# Patient Record
Sex: FEMALE | Race: WHITE | Employment: UNEMPLOYED | URBAN - METROPOLITAN AREA
[De-identification: names, ages, dates, MRNs, and addresses within clinical notes are randomized per-mention and may not be internally consistent; named-entity substitution may affect disease eponyms.]

---

## 2017-02-27 ENCOUNTER — OFFICE VISIT (OUTPATIENT)
Dept: FAMILY MEDICINE CLINIC | Facility: CLINIC | Age: 62
End: 2017-02-27

## 2017-02-27 VITALS
TEMPERATURE: 98 F | BODY MASS INDEX: 20.28 KG/M2 | HEIGHT: 67 IN | HEART RATE: 72 BPM | DIASTOLIC BLOOD PRESSURE: 60 MMHG | RESPIRATION RATE: 72 BRPM | SYSTOLIC BLOOD PRESSURE: 100 MMHG | WEIGHT: 129.19 LBS

## 2017-02-27 DIAGNOSIS — F17.200 TOBACCO USE DISORDER: ICD-10-CM

## 2017-02-27 DIAGNOSIS — H65.93 BILATERAL OTITIS MEDIA WITH EFFUSION: Primary | ICD-10-CM

## 2017-02-27 DIAGNOSIS — J06.9 URI, ACUTE: ICD-10-CM

## 2017-02-27 PROCEDURE — 99213 OFFICE O/P EST LOW 20 MIN: CPT | Performed by: FAMILY MEDICINE

## 2017-02-27 RX ORDER — AZITHROMYCIN 250 MG/1
TABLET, FILM COATED ORAL
Qty: 6 TABLET | Refills: 0 | Status: SHIPPED | OUTPATIENT
Start: 2017-02-27 | End: 2017-04-03 | Stop reason: ALTCHOICE

## 2017-02-27 NOTE — PROGRESS NOTES
Mariza Tran is a 62year old female. S:  Patient presents today with the following concerns:  · Congestion, sinus pressure, PND. Headache. 2 weeks of symptoms. Taking mucinex. Right ear pain. No fevers. No N/V/D. · Wants to quit smoking.  Wo distress. Mood, affect, and behavior are normal.  SKIN: no rashes,no suspicious lesions  HEENT: atraumatic, normocephalic, bilateral TM's with erythema. Nasal turbinates swollen. Pharynx is clear.   EYES:PERRLA, EOMI  NECK: supple,no adenopathy  LUNGS: C

## 2017-03-31 ENCOUNTER — TELEPHONE (OUTPATIENT)
Dept: FAMILY MEDICINE CLINIC | Facility: CLINIC | Age: 62
End: 2017-03-31

## 2017-03-31 NOTE — TELEPHONE ENCOUNTER
LOV 2/22/17 and at that time, pt was advised to follow up in one month. Pt did not start the Chantix right away.  Future Appointments  Date Time Provider Colette Gil   4/3/2017 12:00 PM Hazel Mao PA-C [facility] [place]     She will take her las

## 2017-04-03 NOTE — PROGRESS NOTES
Niraj Aguilera is a 62year old female. S:  Patient presents today with the following concerns:  · Follow up on Chantix. Took for 1 month. Ran out of pills on Thursday so has been off of it for 4 days but did not start smoking again-has come close. kg/m2  GENERAL: well developed, well nourished,in no apparent distress.   Mood, affect, and behavior are normal.  SKIN: no rashes,no suspicious lesions  NECK: supple,no adenopathy  LUNGS: CTA, no RRW  CARDIO: RRR without murmur  NEURO: Oriented times three,

## 2017-06-22 ENCOUNTER — TELEPHONE (OUTPATIENT)
Dept: FAMILY MEDICINE CLINIC | Facility: CLINIC | Age: 62
End: 2017-06-22

## 2017-06-23 ENCOUNTER — HOSPITAL ENCOUNTER (OUTPATIENT)
Dept: MAMMOGRAPHY | Age: 62
Discharge: HOME | End: 2017-06-23
Attending: OBSTETRICS & GYNECOLOGY
Payer: COMMERCIAL

## 2017-06-23 DIAGNOSIS — Z12.31 ENCOUNTER FOR SCREENING MAMMOGRAM FOR MALIGNANT NEOPLASM OF BREAST: ICD-10-CM

## 2017-06-23 PROCEDURE — 77063 BREAST TOMOSYNTHESIS BI: CPT | Performed by: OBSTETRICS & GYNECOLOGY

## 2017-06-23 PROCEDURE — 77067 SCR MAMMO BI INCL CAD: CPT | Performed by: OBSTETRICS & GYNECOLOGY

## 2017-06-29 NOTE — TELEPHONE ENCOUNTER
[address] is calling to find status on Varenicline Tartrate (CHANTIX) 1 MG Oral Tab. Please contact Pharmacy.

## 2017-06-30 RX ORDER — VARENICLINE TARTRATE 1 MG/1
1 TABLET, FILM COATED ORAL 2 TIMES DAILY
Qty: 60 TABLET | Refills: 1 | Status: SHIPPED | OUTPATIENT
Start: 2017-06-30 | End: 2018-02-12 | Stop reason: ALTCHOICE

## 2017-07-17 ENCOUNTER — OFFICE VISIT (OUTPATIENT)
Dept: FAMILY MEDICINE CLINIC | Facility: CLINIC | Age: 62
End: 2017-07-17

## 2017-07-17 VITALS
DIASTOLIC BLOOD PRESSURE: 60 MMHG | SYSTOLIC BLOOD PRESSURE: 106 MMHG | BODY MASS INDEX: 19.86 KG/M2 | WEIGHT: 128 LBS | HEART RATE: 72 BPM | OXYGEN SATURATION: 98 % | HEIGHT: 67.5 IN | TEMPERATURE: 98 F

## 2017-07-17 DIAGNOSIS — J01.00 ACUTE NON-RECURRENT MAXILLARY SINUSITIS: Primary | ICD-10-CM

## 2017-07-17 PROCEDURE — 99213 OFFICE O/P EST LOW 20 MIN: CPT | Performed by: FAMILY MEDICINE

## 2017-07-17 RX ORDER — AMOXICILLIN AND CLAVULANATE POTASSIUM 875; 125 MG/1; MG/1
1 TABLET, FILM COATED ORAL 2 TIMES DAILY
Qty: 20 TABLET | Refills: 0 | Status: SHIPPED | OUTPATIENT
Start: 2017-07-17 | End: 2017-07-27

## 2017-07-17 NOTE — PATIENT INSTRUCTIONS
Take antibiotics with food and plenty of water. Eat yogurt or take probiotic daily. (Rosa Thornton is a good example of an OTC probiotic)  Make sure to finish the entire antibiotic treatment. Increase fluids and rest.   Use otc meds as needed.   Monitor symptoms

## 2017-07-18 NOTE — PROGRESS NOTES
CHIEF COMPLAINT:   Patient presents with:  Sinus Problem: started with allergies about1 week ago. now with headache top of head, right ear pain. no fever/chills.        HPI:   Kishor Singletary is a 65year old female who presents for sinus congestion for MASS  10/2010: OTHER SURGICAL HISTORY      Comment: REMOVED A GROWTH ON TONGUE  1960'S: TONSILLECTOMY   Family History   Problem Relation Age of Onset   • Cancer Maternal Grandmother      BREAST   • Breast Cancer Maternal Grandmother 61   • Cancer Maternal diagnosis)    No orders of the defined types were placed in this encounter.       Meds & Refills for this Visit:  Signed Prescriptions Disp Refills    Amoxicillin-Pot Clavulanate 875-125 MG Oral Tab 20 tablet 0      Sig: Take 1 tablet by mouth 2 (two) times

## 2017-07-24 ENCOUNTER — HOSPITAL ENCOUNTER (OUTPATIENT)
Dept: BONE DENSITY | Age: 62
Discharge: HOME | End: 2017-07-24
Attending: ADVANCED PRACTICE MIDWIFE
Payer: COMMERCIAL

## 2017-07-24 DIAGNOSIS — Z13.820 SCREENING FOR OSTEOPOROSIS: ICD-10-CM

## 2017-07-24 PROCEDURE — 77080 DXA BONE DENSITY AXIAL: CPT | Performed by: ADVANCED PRACTICE MIDWIFE

## 2017-09-12 ENCOUNTER — NURSE ONLY (OUTPATIENT)
Dept: FAMILY MEDICINE CLINIC | Facility: CLINIC | Age: 62
End: 2017-09-12

## 2017-09-12 VITALS
OXYGEN SATURATION: 98 % | BODY MASS INDEX: 20.09 KG/M2 | HEIGHT: 67 IN | SYSTOLIC BLOOD PRESSURE: 114 MMHG | TEMPERATURE: 99 F | HEART RATE: 69 BPM | WEIGHT: 128 LBS | DIASTOLIC BLOOD PRESSURE: 72 MMHG

## 2017-09-12 DIAGNOSIS — H65.191 ACUTE EFFUSION OF RIGHT EAR: ICD-10-CM

## 2017-09-12 DIAGNOSIS — J01.10 ACUTE FRONTAL SINUSITIS, RECURRENCE NOT SPECIFIED: Primary | ICD-10-CM

## 2017-09-12 PROCEDURE — 99213 OFFICE O/P EST LOW 20 MIN: CPT | Performed by: PHYSICIAN ASSISTANT

## 2017-09-12 RX ORDER — AMOXICILLIN AND CLAVULANATE POTASSIUM 875; 125 MG/1; MG/1
1 TABLET, FILM COATED ORAL 2 TIMES DAILY
Qty: 20 TABLET | Refills: 0 | Status: SHIPPED | OUTPATIENT
Start: 2017-09-12 | End: 2017-09-22

## 2017-09-12 NOTE — PROGRESS NOTES
CHIEF COMPLAINT:   Patient presents with:  Sinus Problem: sinus pressure, nose congestion, right ear discomfort, drainage x 1 wk     HPI:   Yessenia Coon is a 65year old female who presents for sinus congestion for  1  weeks.  Symptoms have been worsen Comment: EXCISION OF LEFT BREAST MASS  10/2010: OTHER SURGICAL HISTORY      Comment: REMOVED A GROWTH ON TONGUE  1960'S: TONSILLECTOMY   Family History   Problem Relation Age of Onset   • Cancer Maternal Grandmother      BREAST   • Breast Cancer Matern ASSESSMENT: Acute frontal sinusitis, recurrence not specified  (primary encounter diagnosis)  Acute effusion of right ear    PLAN: Meds as below- take with meals, probiotic and/or greek yogurt as discussed.   Comfort care instructions as listed in Patient I Your doctor may prescribe medications to help treat your sinusitis. If you have an infection, antibiotics can help clear it up. If you are prescribed antibiotics, take all pills on schedule until they are gone, even if you feel better.  Decongestants help r · Over-the-counter decongestants may be used unless a similar medicine was prescribed. Nasal sprays work the fastest. Use one that contains phenylephrine or oxymetazoline. First blow the nose gently. Then use the spray.  Do not use these medicines more ofte © 6737-7464 The [address] [place] [address], [address]. All rights reserved. This information is not intended as a substitute for professional medical care. Always follow your healthcare professional's instructions.             The

## 2018-01-16 ENCOUNTER — OFFICE VISIT (OUTPATIENT)
Dept: FAMILY MEDICINE CLINIC | Facility: CLINIC | Age: 63
End: 2018-01-16

## 2018-01-16 VITALS
TEMPERATURE: 98 F | SYSTOLIC BLOOD PRESSURE: 126 MMHG | DIASTOLIC BLOOD PRESSURE: 74 MMHG | HEART RATE: 63 BPM | HEIGHT: 67 IN | WEIGHT: 130 LBS | OXYGEN SATURATION: 99 % | BODY MASS INDEX: 20.4 KG/M2

## 2018-01-16 DIAGNOSIS — J01.00 ACUTE MAXILLARY SINUSITIS, RECURRENCE NOT SPECIFIED: Primary | ICD-10-CM

## 2018-01-16 PROCEDURE — 99213 OFFICE O/P EST LOW 20 MIN: CPT | Performed by: PHYSICIAN ASSISTANT

## 2018-01-16 RX ORDER — AMOXICILLIN AND CLAVULANATE POTASSIUM 875; 125 MG/1; MG/1
1 TABLET, FILM COATED ORAL 2 TIMES DAILY
Qty: 20 TABLET | Refills: 0 | Status: SHIPPED | OUTPATIENT
Start: 2018-01-16 | End: 2018-01-26

## 2018-01-16 NOTE — PATIENT INSTRUCTIONS
Self-Care for Sinusitis     Drinking plenty of water can help sinuses drain. Sinusitis can often be managed with self-care. Self-care can keep sinuses moist and make you feel more comfortable. Remember to follow your doctor's instructions closely.  This Sinusitis (Antibiotic Treatment)    The sinuses are air-filled spaces within the bones of the face. They connect to the inside of the nose. Sinusitis is an inflammation of the tissue lining the sinus cavity. Sinus inflammation can occur during a cold.  It c · Do not use nasal rinses or irrigation during an acute sinus infection, unless told to by your health care provider. Rinsing may spread the infection to other sinuses.   · Use acetaminophen or ibuprofen to control pain, unless another pain medicine was pre Do what you can to avoid being exposed to colds and flu. When possible, take more time to rest when you feel something “coming on.”  · Wash your hands often. This is especially important during cold and flu season. Try not to touch your face.   · As much as

## 2018-01-16 NOTE — PROGRESS NOTES
CHIEF COMPLAINT:   Patient presents with:  Sinus Problem: headache, sinus pressure, nose congestion, drainage x 1 wk     HPI:   Lamont Vo is a 65year old female who presents for sinus congestion for  1  weeks.  Symptoms have been worsening since on Problem Relation Age of Onset   • Cancer Maternal Grandmother      BREAST   • Breast Cancer Maternal Grandmother 61   • Cancer Maternal Grandfather    • Cancer Father    • Thyroid Disorder Mother       Smoking status: Former Smoker PLAN: Meds as below- take with meals, probiotic and/or greek yogurt as directed. Comfort care instructions as listed in Patient Instructions.     Meds & Refills for this Visit:    Signed Prescriptions Disp Refills    Amoxicillin-Pot Clavulanate 875-125 MG Your doctor may prescribe medications to help treat your sinusitis. If you have an infection, antibiotics can help clear it up. If you are prescribed antibiotics, take all pills on schedule until they are gone, even if you feel better.  Decongestants help r · Over-the-counter decongestants may be used unless a similar medicine was prescribed. Nasal sprays work the fastest. Use one that contains phenylephrine or oxymetazoline. First blow the nose gently. Then use the spray.  Do not use these medicines more ofte © 3720-7151 The [address]. [address] [facility], [facility]. All rights reserved. This information is not intended as a substitute for professional medical care. Always follow your healthcare professional's instructions.         Prevent Keeping your sinuses moist makes your mucus thinner. This allows your sinuses to drain better. And this helps prevent infection. Ask your doctor about these suggestions:  · Use a humidifier. Clean it often to remove any mold or mildew.   · Drink several gla

## 2018-02-12 ENCOUNTER — OFFICE VISIT (OUTPATIENT)
Dept: FAMILY MEDICINE CLINIC | Facility: CLINIC | Age: 63
End: 2018-02-12

## 2018-02-12 VITALS
HEART RATE: 72 BPM | RESPIRATION RATE: 16 BRPM | WEIGHT: 129.19 LBS | DIASTOLIC BLOOD PRESSURE: 76 MMHG | SYSTOLIC BLOOD PRESSURE: 110 MMHG | TEMPERATURE: 98 F | HEIGHT: 67 IN | BODY MASS INDEX: 20.28 KG/M2

## 2018-02-12 DIAGNOSIS — Z12.11 SCREENING FOR COLON CANCER: ICD-10-CM

## 2018-02-12 DIAGNOSIS — J01.01 ACUTE RECURRENT MAXILLARY SINUSITIS: Primary | ICD-10-CM

## 2018-02-12 DIAGNOSIS — L30.9 ECZEMA, UNSPECIFIED TYPE: ICD-10-CM

## 2018-02-12 PROCEDURE — 99213 OFFICE O/P EST LOW 20 MIN: CPT | Performed by: FAMILY MEDICINE

## 2018-02-12 RX ORDER — BIOTIN 5 MG
TABLET ORAL DAILY
COMMUNITY

## 2018-02-12 RX ORDER — DOXYCYCLINE HYCLATE 100 MG/1
100 CAPSULE ORAL 2 TIMES DAILY
Qty: 20 CAPSULE | Refills: 0 | Status: SHIPPED | OUTPATIENT
Start: 2018-02-12 | End: 2018-07-30 | Stop reason: ALTCHOICE

## 2018-02-12 NOTE — PROGRESS NOTES
Hayley Bradley is a 65year old female. S:  Patient presents today with the following concerns:  · Headache on top of head. Right ear pain. Right neck swollen gland. Pressure on right side of face. Feels dried out.  Went to [facility] several weeks ago and 110/76   Pulse 72   Temp 97.6 °F (36.4 °C) (Oral)   Resp 16   Ht 67\"   Wt 129 lb 3.2 oz   BMI 20.24 kg/m²   GENERAL: well developed, well nourished,in no apparent distress.   Mood, affect, and behavior are normal.  SKIN: no rashes,no suspicious lesions  HE

## 2018-02-26 ENCOUNTER — TELEPHONE (OUTPATIENT)
Dept: FAMILY MEDICINE CLINIC | Facility: CLINIC | Age: 63
End: 2018-02-26

## 2018-02-26 RX ORDER — AZITHROMYCIN 250 MG/1
TABLET, FILM COATED ORAL
Qty: 6 TABLET | Refills: 0 | Status: SHIPPED | OUTPATIENT
Start: 2018-02-26 | End: 2018-06-18 | Stop reason: ALTCHOICE

## 2018-02-26 NOTE — TELEPHONE ENCOUNTER
Patient saw Rosendo Spivey PA-C in the office two weeks ago and her symtoms are not cleared up please extend medication, Z pack. Please call to advise.  Cough, raspy voice, headaches, ear

## 2018-05-29 ENCOUNTER — OFFICE VISIT (OUTPATIENT)
Dept: SURGERY | Facility: CLINIC | Age: 63
End: 2018-05-29

## 2018-05-29 VITALS
SYSTOLIC BLOOD PRESSURE: 99 MMHG | HEIGHT: 67 IN | HEART RATE: 65 BPM | DIASTOLIC BLOOD PRESSURE: 68 MMHG | TEMPERATURE: 99 F | BODY MASS INDEX: 20.09 KG/M2 | WEIGHT: 128 LBS

## 2018-05-29 DIAGNOSIS — Z86.010 PERSONAL HISTORY OF COLONIC POLYPS: Primary | ICD-10-CM

## 2018-05-29 PROBLEM — Z86.0100 PERSONAL HISTORY OF COLONIC POLYPS: Status: ACTIVE | Noted: 2018-05-29

## 2018-05-29 PROCEDURE — S0285 CNSLT BEFORE SCREEN COLONOSC: HCPCS | Performed by: SURGERY

## 2018-05-29 RX ORDER — POLYETHYLENE GLYCOL 3350, SODIUM CHLORIDE, SODIUM BICARBONATE, POTASSIUM CHLORIDE 420; 11.2; 5.72; 1.48 G/4L; G/4L; G/4L; G/4L
POWDER, FOR SOLUTION ORAL
Qty: 1 BOTTLE | Refills: 0 | Status: SHIPPED | OUTPATIENT
Start: 2018-05-29 | End: 2018-06-18 | Stop reason: ALTCHOICE

## 2018-05-29 NOTE — H&P
New Patient Visit Note       Active Problems      1.  Personal history of colonic polyps        Chief Complaint   Patient presents with:  Colonoscopy:  no colon cancer history      History of Present Illness     Pt seen at the request of Dr. Kenneth Chen for a repe Packs/day: 0.50      Years: 0.00           Types: Cigarettes       Quit date: 7/26/2013    Smokeless tobacco: Never Used                        Comment: not smoking for 3 week    Alcohol use:  No              Drug use: No            Other Topics Gastrointestinal: Negative for abdominal distention, abdominal pain, anal bleeding, blood in stool, constipation, diarrhea, nausea and vomiting. Genitourinary: Negative for difficulty urinating, dysuria, frequency and urgency.    Musculoskeletal: Vedia Dustman Referrals   None    Follow Up  No Follow-up on file.     Ioana Wong MD

## 2018-06-06 ENCOUNTER — TELEPHONE (OUTPATIENT)
Dept: SURGERY | Facility: CLINIC | Age: 63
End: 2018-06-06

## 2018-06-18 ENCOUNTER — OFFICE VISIT (OUTPATIENT)
Dept: SURGERY | Facility: CLINIC | Age: 63
End: 2018-06-18

## 2018-06-18 VITALS
BODY MASS INDEX: 20.4 KG/M2 | DIASTOLIC BLOOD PRESSURE: 74 MMHG | HEIGHT: 67 IN | TEMPERATURE: 99 F | WEIGHT: 130 LBS | HEART RATE: 76 BPM | SYSTOLIC BLOOD PRESSURE: 115 MMHG

## 2018-06-18 DIAGNOSIS — Z86.010 PERSONAL HISTORY OF COLONIC POLYPS: ICD-10-CM

## 2018-06-18 DIAGNOSIS — K57.30 DIVERTICULOSIS OF LARGE INTESTINE WITHOUT DIVERTICULITIS: Primary | ICD-10-CM

## 2018-06-18 PROBLEM — K21.9 GERD (GASTROESOPHAGEAL REFLUX DISEASE): Status: ACTIVE | Noted: 2018-06-18

## 2018-06-18 PROBLEM — K21.9 GERD (GASTROESOPHAGEAL REFLUX DISEASE): Status: RESOLVED | Noted: 2018-06-18 | Resolved: 2018-06-18

## 2018-06-18 PROCEDURE — 99212 OFFICE O/P EST SF 10 MIN: CPT | Performed by: SURGERY

## 2018-06-18 NOTE — PROGRESS NOTES
Follow Up Visit Note       Active Problems      1. Diverticulosis of large intestine without diverticulitis    2. Personal history of colonic polyps          Chief Complaint   Patient presents with:  Colonoscopy: colonoscopy results. denies any complaints. Packs/day: 0.50      Years: 0.00           Types: Cigarettes       Last attempt to quit: 7/26/2013    Smokeless tobacco: Never Used                        Comment: not smoking for 3 week    Alcohol use:  No              Drug use: No            Other Topic for color change and rash. Neurological: Negative for tremors, syncope and weakness. Hematological: Negative for adenopathy. Does not bruise/bleed easily. Psychiatric/Behavioral: Negative for behavioral problems and sleep disturbance.         Physical

## 2018-06-28 ENCOUNTER — HOSPITAL ENCOUNTER (OUTPATIENT)
Dept: MAMMOGRAPHY | Age: 63
Discharge: HOME | End: 2018-06-28
Attending: ADVANCED PRACTICE MIDWIFE
Payer: COMMERCIAL

## 2018-06-28 DIAGNOSIS — Z12.31 SCREENING MAMMOGRAM, ENCOUNTER FOR: ICD-10-CM

## 2018-06-28 PROCEDURE — 77067 SCR MAMMO BI INCL CAD: CPT | Performed by: ADVANCED PRACTICE MIDWIFE

## 2018-06-28 PROCEDURE — 77063 BREAST TOMOSYNTHESIS BI: CPT | Performed by: ADVANCED PRACTICE MIDWIFE

## 2018-07-30 ENCOUNTER — OFFICE VISIT (OUTPATIENT)
Dept: FAMILY MEDICINE CLINIC | Facility: CLINIC | Age: 63
End: 2018-07-30
Payer: COMMERCIAL

## 2018-07-30 VITALS
HEART RATE: 88 BPM | DIASTOLIC BLOOD PRESSURE: 66 MMHG | OXYGEN SATURATION: 98 % | TEMPERATURE: 99 F | HEIGHT: 67 IN | WEIGHT: 127 LBS | BODY MASS INDEX: 19.93 KG/M2 | SYSTOLIC BLOOD PRESSURE: 112 MMHG

## 2018-07-30 DIAGNOSIS — J01.00 ACUTE NON-RECURRENT MAXILLARY SINUSITIS: Primary | ICD-10-CM

## 2018-07-30 PROCEDURE — 99213 OFFICE O/P EST LOW 20 MIN: CPT | Performed by: FAMILY MEDICINE

## 2018-07-30 RX ORDER — CEFDINIR 300 MG/1
300 CAPSULE ORAL 2 TIMES DAILY
Qty: 20 CAPSULE | Refills: 0 | Status: SHIPPED | OUTPATIENT
Start: 2018-07-30 | End: 2018-09-24 | Stop reason: ALTCHOICE

## 2018-07-30 NOTE — PATIENT INSTRUCTIONS
Take antibiotics with food and plenty of water. Eat yogurt or take probiotic daily. (Pilar Saliva is a good example of an OTC probiotic)  Make sure to finish the entire antibiotic treatment.   Increase fluids and rest.   Use otc meds as needed for comfort:  Ibup

## 2018-07-30 NOTE — PROGRESS NOTES
CHIEF COMPLAINT:   Patient presents with:  Sinus Problem: congestion, headache, sinus pressure, ear discomfort, drainage x 5 dys       HPI:   Yaneth Burr is a 59year old female who presents for sinus congestion for  5  days.  Symptoms have been worse Relation Age of Onset   • Cancer Maternal Grandmother      BREAST   • Breast Cancer Maternal Grandmother 61   • Cancer Maternal Grandfather    • Cancer Father    • Thyroid Disorder Mother       Smoking status: Current Some Day Smoker Visit:  Signed Prescriptions Disp Refills    cefdinir 300 MG Oral Cap 20 capsule 0      Sig: Take 1 capsule (300 mg total) by mouth 2 (two) times daily. Risks, benefits, side effects of medication addressed and explained.     Patient Instructi

## 2018-09-01 ENCOUNTER — OFFICE VISIT (OUTPATIENT)
Dept: FAMILY MEDICINE CLINIC | Facility: CLINIC | Age: 63
End: 2018-09-01
Payer: COMMERCIAL

## 2018-09-01 VITALS
OXYGEN SATURATION: 99 % | HEART RATE: 74 BPM | TEMPERATURE: 98 F | DIASTOLIC BLOOD PRESSURE: 66 MMHG | SYSTOLIC BLOOD PRESSURE: 104 MMHG | BODY MASS INDEX: 20 KG/M2 | WEIGHT: 130 LBS

## 2018-09-01 DIAGNOSIS — J01.90 ACUTE SINUSITIS, RECURRENCE NOT SPECIFIED, UNSPECIFIED LOCATION: Primary | ICD-10-CM

## 2018-09-01 PROCEDURE — 99213 OFFICE O/P EST LOW 20 MIN: CPT | Performed by: PHYSICIAN ASSISTANT

## 2018-09-01 RX ORDER — AMOXICILLIN AND CLAVULANATE POTASSIUM 875; 125 MG/1; MG/1
1 TABLET, FILM COATED ORAL 2 TIMES DAILY
Qty: 20 TABLET | Refills: 0 | Status: SHIPPED | OUTPATIENT
Start: 2018-09-01 | End: 2018-09-11

## 2018-09-01 NOTE — PROGRESS NOTES
CHIEF COMPLAINT:   Patient presents with:  Sinus Problem: x on and off 1 week  Headache    HPI:   Yaneth Burr is a 59year old female who presents for sinus congestion for  1  weeks. Symptoms have been worsening since onset.  Sinus congestion/pain is Comment: REMOVED A GROWTH ON TONGUE  1960'S: TONSILLECTOMY   Family History   Problem Relation Age of Onset   • Cancer Maternal Grandmother      BREAST   • Breast Cancer Maternal Grandmother 61   • Cancer Maternal Grandfather    • Cancer Father    • Th ASSESSMENT: Acute sinusitis, recurrence not specified, unspecified location  (primary encounter diagnosis)    PLAN: Med as below with meals, probiotic and/or greek yogurt as directed. Comfort care instructions as listed in Patient Instructions.     Meds & · You can use an over-the-counter decongestant, unless a similar medicine was prescribed to you. Nasal sprays work the fastest. Use one that contains phenylephrine or oxymetazoline. First blow your nose gently. Then use the spray.  Do not use these medicine · Don’t have close contact with people who have sore throats, colds, or other upper respiratory infections. · Don’t smoke, and stay away from secondhand smoke. · Stay up to date with of your vaccines.   Date Last Reviewed: 11/1/2017  © 5486-3043 The [employer] · Ask your healthcare provider about a referral to have an allergy evaluation. Or ask for a referral to see an allergy specialist.  Boost moisture  Keeping your sinuses moist makes your mucus thinner. This allows your sinuses to drain better.  And this help Applying heat to the area surrounding your sinuses may make you feel more comfortable. Use a hot water bottle or a hand towel dipped in hot water. Some people also find ice packs effective for relieving pain.   Medicines  Your doctor may prescribe medicatio

## 2018-09-01 NOTE — PATIENT INSTRUCTIONS
Sinusitis (Antibiotic Treatment)    The sinuses are air-filled spaces within the bones of the face. They connect to the inside of the nose. Sinusitis is an inflammation of the tissue that lines the sinuses. Sinusitis can occur during a cold.  It can also · Do not use nasal rinses or irrigation during an acute sinus infection, unless your healthcare provider tells you to. Rinsing may spread the infection to other areas in your sinuses.   · Use acetaminophen or ibuprofen to control pain, unless another pain m Colds, flu, and allergies make it more likely for you to get sinusitis. Do your best to prevent sinusitis by preventing these problems. Do what you can to avoid getting colds and other infections. Stay away from things that cause allergies (allergens).  Court [place] · Stay away from all types of smoke, which dries out sinus linings. This includes tobacco smoke and chemical smoke in workplace settings. · Use saltwater rinses. Date Last Reviewed: 10/1/2016  © 3955-8924 The [address].  [facility] Your doctor may prescribe medications to help treat your sinusitis. If you have an infection, antibiotics can help clear it up. If you are prescribed antibiotics, take all pills on schedule until they are gone, even if you feel better.  Decongestants help r

## 2018-09-24 ENCOUNTER — TELEPHONE (OUTPATIENT)
Dept: FAMILY MEDICINE CLINIC | Facility: CLINIC | Age: 63
End: 2018-09-24

## 2018-09-24 ENCOUNTER — OFFICE VISIT (OUTPATIENT)
Dept: FAMILY MEDICINE CLINIC | Facility: CLINIC | Age: 63
End: 2018-09-24
Payer: COMMERCIAL

## 2018-09-24 VITALS
DIASTOLIC BLOOD PRESSURE: 70 MMHG | RESPIRATION RATE: 16 BRPM | WEIGHT: 130.19 LBS | HEART RATE: 64 BPM | TEMPERATURE: 98 F | BODY MASS INDEX: 19.96 KG/M2 | HEIGHT: 67.6 IN | SYSTOLIC BLOOD PRESSURE: 108 MMHG

## 2018-09-24 DIAGNOSIS — J01.01 ACUTE RECURRENT MAXILLARY SINUSITIS: Primary | ICD-10-CM

## 2018-09-24 PROCEDURE — 99213 OFFICE O/P EST LOW 20 MIN: CPT | Performed by: FAMILY MEDICINE

## 2018-09-24 RX ORDER — AZITHROMYCIN 250 MG/1
TABLET, FILM COATED ORAL
Qty: 6 TABLET | Refills: 0 | Status: SHIPPED | OUTPATIENT
Start: 2018-09-24 | End: 2018-12-09

## 2018-09-24 NOTE — PROGRESS NOTES
Tyron Lacey is a 59year old female. S:  Patient presents today with the following concerns:  · Was at [facility] 9/1 and given Augmentin and did not feel 100% after taking. Right ear pain. Sinus pressure. Night sweats for 2 nights.   Using sinus irrig 67.6\"   Wt 130 lb 3.2 oz   BMI 20.03 kg/m²   GENERAL: well developed, well nourished,in no apparent distress. Mood, affect, and behavior are normal.  SKIN: no rashes,no suspicious lesions  HEENT: atraumatic, normocephalic, nasal turbinates are clear.   Ri

## 2018-09-24 NOTE — TELEPHONE ENCOUNTER
Medical Record Release signed by pt requesting all of her Medical Records from Dr Ayla Villalba.  Request faxed to  [phone]

## 2018-12-09 ENCOUNTER — OFFICE VISIT (OUTPATIENT)
Dept: FAMILY MEDICINE CLINIC | Facility: CLINIC | Age: 63
End: 2018-12-09
Payer: COMMERCIAL

## 2018-12-09 VITALS
SYSTOLIC BLOOD PRESSURE: 98 MMHG | BODY MASS INDEX: 20 KG/M2 | RESPIRATION RATE: 18 BRPM | HEART RATE: 105 BPM | WEIGHT: 128.81 LBS | DIASTOLIC BLOOD PRESSURE: 64 MMHG | OXYGEN SATURATION: 98 % | TEMPERATURE: 99 F

## 2018-12-09 DIAGNOSIS — R51.9 SINUS HEADACHE: ICD-10-CM

## 2018-12-09 DIAGNOSIS — R09.81 SINUS CONGESTION: Primary | ICD-10-CM

## 2018-12-09 DIAGNOSIS — J06.9 VIRAL URI WITH COUGH: ICD-10-CM

## 2018-12-09 PROCEDURE — 99213 OFFICE O/P EST LOW 20 MIN: CPT | Performed by: NURSE PRACTITIONER

## 2018-12-09 RX ORDER — PREDNISONE 20 MG/1
40 TABLET ORAL DAILY
Qty: 6 TABLET | Refills: 0 | Status: SHIPPED | OUTPATIENT
Start: 2018-12-09 | End: 2018-12-12

## 2018-12-09 NOTE — PROGRESS NOTES
CHIEF COMPLAINT:   Patient presents with:  Sinus Problem: right ear pain, head congestion, cough, x 3 days       HPI:   Yadira Devries is a 59year old female who presents for upper respiratory symptoms for  2-3 days.  Dry cough, right sided pressure to Drug use: No        REVIEW OF SYSTEMS:   GENERAL: fair appetite.  No fever/chills/bodyaches  SKIN: no rashes or abnormal skin lesions  HEENT: See HPI  LUNGS: denies shortness of breath or wheezing, See HPI  GI: denies N/V/C or abdominal pain  NEURO: + hea Sinus headaches can cause a gnawing pain behind the nose and eyes. The pain most often gets worse in the afternoon and evening. You may also run a fever. Sinus headaches are caused by colds or allergies that make the nasal passages inflamed or infected.   Franchesca Fernández

## 2019-05-21 ENCOUNTER — OFFICE VISIT (OUTPATIENT)
Dept: FAMILY MEDICINE CLINIC | Facility: CLINIC | Age: 64
End: 2019-05-21
Payer: COMMERCIAL

## 2019-05-21 VITALS
BODY MASS INDEX: 24.35 KG/M2 | HEART RATE: 92 BPM | TEMPERATURE: 98 F | SYSTOLIC BLOOD PRESSURE: 112 MMHG | HEIGHT: 67.6 IN | OXYGEN SATURATION: 98 % | WEIGHT: 158.81 LBS | DIASTOLIC BLOOD PRESSURE: 70 MMHG | RESPIRATION RATE: 14 BRPM

## 2019-05-21 DIAGNOSIS — J01.00 ACUTE NON-RECURRENT MAXILLARY SINUSITIS: Primary | ICD-10-CM

## 2019-05-21 DIAGNOSIS — H66.93 ACUTE BILATERAL OTITIS MEDIA: ICD-10-CM

## 2019-05-21 PROCEDURE — 99213 OFFICE O/P EST LOW 20 MIN: CPT | Performed by: NURSE PRACTITIONER

## 2019-05-21 RX ORDER — FLUTICASONE PROPIONATE 50 MCG
2 SPRAY, SUSPENSION (ML) NASAL DAILY
Qty: 1 BOTTLE | Refills: 0 | Status: SHIPPED | OUTPATIENT
Start: 2019-05-21 | End: 2019-06-04

## 2019-05-21 RX ORDER — AMOXICILLIN AND CLAVULANATE POTASSIUM 875; 125 MG/1; MG/1
1 TABLET, FILM COATED ORAL 2 TIMES DAILY
Qty: 20 TABLET | Refills: 0 | Status: SHIPPED | OUTPATIENT
Start: 2019-05-21 | End: 2019-05-31

## 2019-05-21 NOTE — PROGRESS NOTES
CHIEF COMPLAINT:   Patient presents with:  Sinus Problem: x 3 weeks        HPI:   Luann Wilson is a 62year old female who presents for sinus congestion for 3 weeks. Symptoms have been worsening since onset.  Sinus congestion/pain is described as a pre Quit date: 2013        Years since quittin.8      Smokeless tobacco: Never Used      Tobacco comment: not smoking for 3 week    Alcohol use: No      Alcohol/week: 0.0 oz    Drug use: No        REVIEW OF SYSTEMS:   GENERAL: feels well otherwi Sig: Take 1 tablet by mouth 2 (two) times daily for 10 days. • Fluticasone Propionate 50 MCG/ACT Nasal Suspension 1 Bottle 0     Si sprays by Each Nare route daily for 14 days.            Patient Instructions   -   Increase oral fluids to loosen and Sinusitis (Antibiotic Treatment)    The sinuses are air-filled spaces within the bones of the face. They connect to the inside of the nose. Sinusitis is an inflammation of the tissue that lines the sinuses. Sinusitis can occur during a cold.  It can also · Do not use nasal rinses or irrigation during an acute sinus infection, unless your healthcare provider tells you to. Rinsing may spread the infection to other areas in your sinuses.   · Use acetaminophen or ibuprofen to control pain, unless another pain m You have an infection of the middle ear, the space behind the eardrum. This is also called acute otitis media (AOM). Sometimes it is caused by the common cold.  This is because congestion can block the internal passage (eustachian tube) that drains fluid fr The patient indicates understanding of these issues and agrees to the plan.

## 2019-05-21 NOTE — PATIENT INSTRUCTIONS
-   Increase oral fluids to loosen and thin secretions, eat a nutritious diet  -   Tylenol or ibuprofen for pain as packet insert; age appropriate with weight  -   Robitussin DM, Mucinex DM, or generic equivalent for cough as packet insert  -   Return to c can occur during a cold. It can also happen due to allergies to pollens and other particles in the air. Sinusitis can cause symptoms of sinus congestion and a feeling of fullness. A sinus infection causes fever, headache, and facial pain.  There is often gr pain, unless another pain medicine was prescribed to you. If you have chronic liver or kidney disease or ever had a stomach ulcer, talk with your healthcare provider before using these medicines.  (Aspirin should never be taken by anyone under age 25 who is improve within 1 to 2 days of treatment. Home care  The following are general care guidelines:  · Finish all of the antibiotic medicine given, even though you may feel better after the first few days.   · You may use over-the-counter medicine, such as ac

## 2019-06-12 ENCOUNTER — LAB ENCOUNTER (OUTPATIENT)
Dept: LAB | Age: 64
End: 2019-06-12
Attending: DENTIST
Payer: COMMERCIAL

## 2019-06-12 DIAGNOSIS — Q38.3 TONGUE ABNORMALITY: Primary | ICD-10-CM

## 2019-06-12 PROCEDURE — 87106 FUNGI IDENTIFICATION YEAST: CPT

## 2019-06-12 PROCEDURE — 87102 FUNGUS ISOLATION CULTURE: CPT

## 2019-06-12 PROCEDURE — 87206 SMEAR FLUORESCENT/ACID STAI: CPT

## 2019-08-13 ENCOUNTER — HOSPITAL ENCOUNTER (OUTPATIENT)
Dept: MAMMOGRAPHY | Age: 64
Discharge: HOME | End: 2019-08-13
Attending: ADVANCED PRACTICE MIDWIFE
Payer: COMMERCIAL

## 2019-08-13 ENCOUNTER — HOSPITAL ENCOUNTER (OUTPATIENT)
Dept: BONE DENSITY | Age: 64
Discharge: HOME | End: 2019-08-13
Attending: ADVANCED PRACTICE MIDWIFE
Payer: COMMERCIAL

## 2019-08-13 DIAGNOSIS — Z78.0 POST-MENOPAUSAL: ICD-10-CM

## 2019-08-13 DIAGNOSIS — Z12.31 SCREENING MAMMOGRAM, ENCOUNTER FOR: ICD-10-CM

## 2019-08-13 PROCEDURE — 77067 SCR MAMMO BI INCL CAD: CPT | Performed by: ADVANCED PRACTICE MIDWIFE

## 2019-08-13 PROCEDURE — 77063 BREAST TOMOSYNTHESIS BI: CPT | Performed by: ADVANCED PRACTICE MIDWIFE

## 2019-08-13 PROCEDURE — 77080 DXA BONE DENSITY AXIAL: CPT | Performed by: ADVANCED PRACTICE MIDWIFE

## 2019-11-04 ENCOUNTER — OFFICE VISIT (OUTPATIENT)
Dept: FAMILY MEDICINE CLINIC | Facility: CLINIC | Age: 64
End: 2019-11-04
Payer: COMMERCIAL

## 2019-11-04 VITALS
SYSTOLIC BLOOD PRESSURE: 104 MMHG | DIASTOLIC BLOOD PRESSURE: 82 MMHG | RESPIRATION RATE: 16 BRPM | TEMPERATURE: 99 F | BODY MASS INDEX: 19.09 KG/M2 | HEART RATE: 88 BPM | HEIGHT: 67 IN | WEIGHT: 121.63 LBS

## 2019-11-04 DIAGNOSIS — Z11.59 ENCOUNTER FOR HEPATITIS C SCREENING TEST FOR LOW RISK PATIENT: ICD-10-CM

## 2019-11-04 DIAGNOSIS — Z13.21 ENCOUNTER FOR VITAMIN DEFICIENCY SCREENING: ICD-10-CM

## 2019-11-04 DIAGNOSIS — Z12.2 ENCOUNTER FOR SCREENING FOR MALIGNANT NEOPLASM OF LUNG: ICD-10-CM

## 2019-11-04 DIAGNOSIS — B37.0 ORAL CANDIDIASIS: ICD-10-CM

## 2019-11-04 DIAGNOSIS — Z00.00 LABORATORY EXAMINATION ORDERED AS PART OF A ROUTINE GENERAL MEDICAL EXAMINATION: ICD-10-CM

## 2019-11-04 DIAGNOSIS — F17.210 CIGARETTE SMOKER: ICD-10-CM

## 2019-11-04 DIAGNOSIS — Z23 NEED FOR VACCINATION: ICD-10-CM

## 2019-11-04 DIAGNOSIS — R10.9 FLANK PAIN: Primary | ICD-10-CM

## 2019-11-04 PROCEDURE — 90471 IMMUNIZATION ADMIN: CPT | Performed by: FAMILY MEDICINE

## 2019-11-04 PROCEDURE — 81003 URINALYSIS AUTO W/O SCOPE: CPT | Performed by: FAMILY MEDICINE

## 2019-11-04 PROCEDURE — 99214 OFFICE O/P EST MOD 30 MIN: CPT | Performed by: FAMILY MEDICINE

## 2019-11-04 PROCEDURE — 90686 IIV4 VACC NO PRSV 0.5 ML IM: CPT | Performed by: FAMILY MEDICINE

## 2019-11-04 RX ORDER — FLUCONAZOLE 200 MG/1
200 TABLET ORAL DAILY
Qty: 14 TABLET | Refills: 0 | Status: SHIPPED | OUTPATIENT
Start: 2019-11-04 | End: 2019-11-18

## 2019-11-04 RX ORDER — BUPROPION HYDROCHLORIDE 150 MG/1
TABLET, EXTENDED RELEASE ORAL
Qty: 60 TABLET | Refills: 1 | Status: SHIPPED | OUTPATIENT
Start: 2019-11-04 | End: 2019-11-25

## 2019-11-04 RX ORDER — CYCLOSPORINE 0.5 MG/ML
1 EMULSION OPHTHALMIC 2 TIMES DAILY
Refills: 4 | COMMUNITY
Start: 2019-08-16

## 2019-11-06 ENCOUNTER — LAB ENCOUNTER (OUTPATIENT)
Dept: LAB | Age: 64
End: 2019-11-06
Attending: FAMILY MEDICINE
Payer: COMMERCIAL

## 2019-11-06 DIAGNOSIS — Z11.59 ENCOUNTER FOR HEPATITIS C SCREENING TEST FOR LOW RISK PATIENT: ICD-10-CM

## 2019-11-06 DIAGNOSIS — Z00.00 LABORATORY EXAMINATION ORDERED AS PART OF A ROUTINE GENERAL MEDICAL EXAMINATION: ICD-10-CM

## 2019-11-06 DIAGNOSIS — Z13.21 ENCOUNTER FOR VITAMIN DEFICIENCY SCREENING: ICD-10-CM

## 2019-11-06 PROCEDURE — 80061 LIPID PANEL: CPT | Performed by: FAMILY MEDICINE

## 2019-11-06 PROCEDURE — 86803 HEPATITIS C AB TEST: CPT | Performed by: FAMILY MEDICINE

## 2019-11-06 PROCEDURE — 82306 VITAMIN D 25 HYDROXY: CPT | Performed by: FAMILY MEDICINE

## 2019-11-06 PROCEDURE — 36415 COLL VENOUS BLD VENIPUNCTURE: CPT | Performed by: FAMILY MEDICINE

## 2019-11-06 PROCEDURE — 80050 GENERAL HEALTH PANEL: CPT | Performed by: FAMILY MEDICINE

## 2019-11-13 ENCOUNTER — HOSPITAL ENCOUNTER (OUTPATIENT)
Dept: CT IMAGING | Facility: HOSPITAL | Age: 64
Discharge: HOME | End: 2019-11-13
Attending: FAMILY MEDICINE
Payer: COMMERCIAL

## 2019-11-13 DIAGNOSIS — Z12.2 ENCOUNTER FOR SCREENING FOR MALIGNANT NEOPLASM OF LUNG: ICD-10-CM

## 2019-11-13 DIAGNOSIS — F17.210 CIGARETTE SMOKER: ICD-10-CM

## 2019-11-19 ENCOUNTER — OFFICE VISIT (OUTPATIENT)
Dept: FAMILY MEDICINE CLINIC | Facility: CLINIC | Age: 64
End: 2019-11-19
Payer: COMMERCIAL

## 2019-11-19 VITALS
HEIGHT: 67 IN | BODY MASS INDEX: 19.19 KG/M2 | DIASTOLIC BLOOD PRESSURE: 60 MMHG | TEMPERATURE: 98 F | WEIGHT: 122.25 LBS | SYSTOLIC BLOOD PRESSURE: 100 MMHG | RESPIRATION RATE: 16 BRPM | HEART RATE: 84 BPM

## 2019-11-19 DIAGNOSIS — R11.0 NAUSEA: ICD-10-CM

## 2019-11-19 DIAGNOSIS — R20.0 NUMBNESS AND TINGLING OF BOTH LOWER EXTREMITIES: Primary | ICD-10-CM

## 2019-11-19 DIAGNOSIS — R07.89 CHEST TIGHTNESS: ICD-10-CM

## 2019-11-19 DIAGNOSIS — R20.2 NUMBNESS AND TINGLING OF BOTH UPPER EXTREMITIES: ICD-10-CM

## 2019-11-19 DIAGNOSIS — R20.2 NUMBNESS AND TINGLING OF BOTH LOWER EXTREMITIES: Primary | ICD-10-CM

## 2019-11-19 DIAGNOSIS — R20.0 NUMBNESS AND TINGLING OF BOTH UPPER EXTREMITIES: ICD-10-CM

## 2019-11-19 DIAGNOSIS — R42 DIZZINESS: ICD-10-CM

## 2019-11-19 PROCEDURE — 93000 ELECTROCARDIOGRAM COMPLETE: CPT | Performed by: NURSE PRACTITIONER

## 2019-11-19 PROCEDURE — 99214 OFFICE O/P EST MOD 30 MIN: CPT | Performed by: NURSE PRACTITIONER

## 2019-11-19 NOTE — PROGRESS NOTES
Patient presents with:  Numbness: numbness and tingling in legs and arms,dizzness, nausa       HPI:  Presents with approx 2 day history of mild numbness/tingling to arms and legs, mild dizziness, nausea, and several hour history of mild chest tightness.  Taryn Take by mouth daily. • Krill Oil 1000 MG Oral Cap Take by mouth daily. • Calcium Carb-Cholecalciferol (CALCIUM 1000 + D) 1000-800 MG-UNIT Oral Tab Take 1 tablet by mouth daily.      • Multiple Vitamins-Minerals (HAIR/SKIN/NAILS OR) Take by mouth jesusita stimulation and paraesthesia's). Stop bupropion now. RTO in 6 days for follow up. Sooner if any worsening or new symptoms. Verbalized understanding of instructions and agreeable to this plan of care.      Numbness and tingling of both upper extremities- I s days for recheck. Sooner if you experience any problems. All questions were answered and the patient understands the plan.

## 2019-11-25 ENCOUNTER — OFFICE VISIT (OUTPATIENT)
Dept: FAMILY MEDICINE CLINIC | Facility: CLINIC | Age: 64
End: 2019-11-25
Payer: COMMERCIAL

## 2019-11-25 VITALS
WEIGHT: 124 LBS | HEART RATE: 64 BPM | DIASTOLIC BLOOD PRESSURE: 84 MMHG | SYSTOLIC BLOOD PRESSURE: 118 MMHG | TEMPERATURE: 97 F | RESPIRATION RATE: 16 BRPM | BODY MASS INDEX: 19 KG/M2

## 2019-11-25 DIAGNOSIS — R91.8 ABNORMAL CT LUNG SCREENING: ICD-10-CM

## 2019-11-25 DIAGNOSIS — T88.7XXD NON-DOSE-RELATED ADVERSE EFFECT OF MEDICATION, SUBSEQUENT ENCOUNTER: Primary | ICD-10-CM

## 2019-11-25 DIAGNOSIS — Z87.891 QUIT SMOKING WITHIN PAST YEAR: ICD-10-CM

## 2019-11-25 PROCEDURE — 99214 OFFICE O/P EST MOD 30 MIN: CPT | Performed by: FAMILY MEDICINE

## 2019-11-25 NOTE — PROGRESS NOTES
Yadira Devries is a 62year old female. S:  Patient presents today with the following concerns:  Medication Problem (f/u possible medication reaction to Wellbutrin. Numbness and SOB)    Took Wellbutrin for at least 2 weeks. Was overstimulated on it. BMI 19.42 kg/m²   GENERAL: well developed, well nourished,in no apparent distress.   Mood, affect, and behavior are normal.  SKIN: no rashes,no suspicious lesions  HEENT: atraumatic, normocephalic,ears and throat are clear  EYES:PERRLA, EOMI  NECK: supple,n

## 2019-12-26 ENCOUNTER — OFFICE VISIT (OUTPATIENT)
Dept: FAMILY MEDICINE CLINIC | Facility: CLINIC | Age: 64
End: 2019-12-26
Payer: COMMERCIAL

## 2019-12-26 VITALS
HEIGHT: 67 IN | OXYGEN SATURATION: 98 % | BODY MASS INDEX: 19.62 KG/M2 | RESPIRATION RATE: 18 BRPM | DIASTOLIC BLOOD PRESSURE: 78 MMHG | WEIGHT: 125 LBS | TEMPERATURE: 98 F | SYSTOLIC BLOOD PRESSURE: 120 MMHG | HEART RATE: 78 BPM

## 2019-12-26 DIAGNOSIS — J34.89 SINUS PRESSURE: Primary | ICD-10-CM

## 2019-12-26 PROCEDURE — 99213 OFFICE O/P EST LOW 20 MIN: CPT | Performed by: NURSE PRACTITIONER

## 2019-12-26 RX ORDER — PREDNISONE 20 MG/1
TABLET ORAL
Qty: 6 TABLET | Refills: 0 | Status: SHIPPED | OUTPATIENT
Start: 2019-12-26 | End: 2020-01-06 | Stop reason: ALTCHOICE

## 2019-12-26 NOTE — PROGRESS NOTES
CHIEF COMPLAINT:   No chief complaint on file. HPI:   Lashawn Ramos is a 62year old female who presents for sinus symptoms for  3  days. Symptoms have been worsening since onset. Sinus pain/pressure is located mainly on right side of face.   Repor • OTHER SURGICAL HISTORY  10/2010    REMOVED A GROWTH ON TONGUE   • TONSILLECTOMY  1960'S      Family History   Problem Relation Age of Onset   • Cancer Maternal Grandmother         BREAST   • Breast Cancer Maternal Grandmother 61   • Cancer Maternal Gunter ASSESSMENT:  Hannah Taylor is a 62year old female who presents with sinus pressure, 1 day of fever: Tmax 101., has been ill for 3 days. Discussed viral vs bacterial illness, antibiotics are best for bacterial illnesses. ASSESSMENT: 1.  Acute Sinusi The sinuses are air-filled spaces within the bones of the face. They connect to the inside of the nose. Sinusitis is an inflammation of the tissue lining the sinus cavity.  Sinus inflammation can occur during a cold or hay fever (allergies to pollens and ot · You may use over-the-counter decongestants unless a similar medicine was prescribed. Nasal sprays or drops work the fastest. Use one that contains phenylephrine or oxymetazoline. First blow your nose gently to remove mucus. Then apply the spray or drops. · You may use over-the-counter medicine to control pain and fever, unless another pain medicine was prescribed. Talk with your doctor before using acetaminophen or ibuprofen if you have chronic liver or kidney disease.  Also talk with your doctor if you hav

## 2020-01-06 ENCOUNTER — OFFICE VISIT (OUTPATIENT)
Dept: FAMILY MEDICINE CLINIC | Facility: CLINIC | Age: 65
End: 2020-01-06
Payer: COMMERCIAL

## 2020-01-06 ENCOUNTER — TELEPHONE (OUTPATIENT)
Dept: FAMILY MEDICINE CLINIC | Facility: CLINIC | Age: 65
End: 2020-01-06

## 2020-01-06 VITALS
DIASTOLIC BLOOD PRESSURE: 56 MMHG | HEART RATE: 64 BPM | WEIGHT: 125.19 LBS | SYSTOLIC BLOOD PRESSURE: 98 MMHG | BODY MASS INDEX: 19.65 KG/M2 | TEMPERATURE: 98 F | RESPIRATION RATE: 16 BRPM | HEIGHT: 67 IN

## 2020-01-06 DIAGNOSIS — K59.00 CONSTIPATION, UNSPECIFIED CONSTIPATION TYPE: Primary | ICD-10-CM

## 2020-01-06 PROCEDURE — 99213 OFFICE O/P EST LOW 20 MIN: CPT | Performed by: FAMILY MEDICINE

## 2020-01-06 RX ORDER — ESTRADIOL 10 UG/1
INSERT VAGINAL
COMMUNITY
Start: 2019-12-02

## 2020-01-06 RX ORDER — ESTRADIOL 0.1 MG/G
CREAM VAGINAL
COMMUNITY
Start: 2019-12-02

## 2020-01-06 NOTE — TELEPHONE ENCOUNTER
Pt has scheduled an appt for today for stomach issues and states that this is an ongoing issue. Was informed to come in this wk if still having issues. Should she be called as well?

## 2020-01-06 NOTE — PATIENT INSTRUCTIONS
Suspect constipation    Step one - add Miramax. One capful a day. Lots of water. Add prunes too. If by Wednesday no improvement, go to twice a day. Can consider adding dulcolax, which helps get the colon moving.      If after a few days of this no go

## 2020-01-06 NOTE — PROGRESS NOTES
Patient presents with:  Abdominal Pain: All over abdominal pain that can wrap around to sides and back. HPI:   Maryellen Eduardo is a 62year old female with PMH smoking (quit 11/2019) who presents to the office to discuss GI issues.   Recent travel to

## 2020-01-30 ENCOUNTER — OFFICE VISIT (OUTPATIENT)
Dept: FAMILY MEDICINE CLINIC | Facility: CLINIC | Age: 65
End: 2020-01-30
Payer: COMMERCIAL

## 2020-01-30 VITALS
DIASTOLIC BLOOD PRESSURE: 76 MMHG | HEART RATE: 83 BPM | BODY MASS INDEX: 19.62 KG/M2 | HEIGHT: 67 IN | OXYGEN SATURATION: 98 % | SYSTOLIC BLOOD PRESSURE: 108 MMHG | WEIGHT: 125 LBS | TEMPERATURE: 99 F | RESPIRATION RATE: 18 BRPM

## 2020-01-30 DIAGNOSIS — J40 BRONCHITIS: Primary | ICD-10-CM

## 2020-01-30 DIAGNOSIS — J06.9 VIRAL UPPER RESPIRATORY TRACT INFECTION: ICD-10-CM

## 2020-01-30 PROCEDURE — 99213 OFFICE O/P EST LOW 20 MIN: CPT | Performed by: NURSE PRACTITIONER

## 2020-01-30 RX ORDER — BENZONATATE 200 MG/1
200 CAPSULE ORAL 3 TIMES DAILY PRN
Qty: 20 CAPSULE | Refills: 0 | Status: SHIPPED | OUTPATIENT
Start: 2020-01-30

## 2020-01-30 RX ORDER — PREDNISONE 20 MG/1
20 TABLET ORAL 2 TIMES DAILY
Qty: 10 TABLET | Refills: 0 | Status: SHIPPED | OUTPATIENT
Start: 2020-01-30 | End: 2020-02-04

## 2020-01-30 NOTE — PROGRESS NOTES
CHIEF COMPLAINT:   Patient presents with:  Cough: dry, s/s for 3 days. HPI:   Erlin Delgado is a 62year old female who presents for upper respiratory symptoms for  3 days. Patient reports dry cough.   Associated symptoms include deep tight cou Alcohol/week: 0.0 standard drinks    Drug use: No        REVIEW OF SYSTEMS:   GENERAL: feels well otherwise,   good appetite  SKIN: no rashes or abnormal skin lesions  HEENT: See HPI  LUNGS: denies shortness of breath or wheezing, See HPI  CARDIOVASCUL Sig: Take 1 capsule (200 mg total) by mouth 3 (three) times daily as needed for cough. Take with a full glass of water and DO NOT CHEW.            Patient Instructions   Rest and fluids  No ibuprofen with Prednisone as prescribed   Benzonatate as prescrib · You may use over-the-counter medicine to control fever or pain, unless another pain medicine was prescribed. If you have chronic liver or kidney disease or have ever had a stomach ulcer or gastrointestinal bleeding, talk with your healthcare provider bef © 2900-8361 The [address]. [address] [facility], [address]. All rights reserved. This information is not intended as a substitute for professional medical care. Always follow your healthcare professional's instructions.             The

## 2020-01-30 NOTE — PATIENT INSTRUCTIONS
Rest and fluids  No ibuprofen with Prednisone as prescribed   Benzonatate as prescribed      Follow-up if not improving         Viral Bronchitis (Adult)    You have a viral bronchitis. Bronchitis is inflammation and swelling of the lining of the lungs.  Jaziel Werner drinks, juices, tea, or soup). Extra fluids will help loosen secretions in the nose and lungs. · Over-the-counter cough, cold, and sore-throat medicines will not shorten the length of the illness, but they may help to reduce symptoms.  Don't use decongesta

## 2020-02-06 ENCOUNTER — OFFICE VISIT (OUTPATIENT)
Dept: FAMILY MEDICINE CLINIC | Facility: CLINIC | Age: 65
End: 2020-02-06
Payer: COMMERCIAL

## 2020-02-06 VITALS
TEMPERATURE: 98 F | SYSTOLIC BLOOD PRESSURE: 120 MMHG | RESPIRATION RATE: 16 BRPM | BODY MASS INDEX: 19.62 KG/M2 | WEIGHT: 125 LBS | HEIGHT: 67 IN | DIASTOLIC BLOOD PRESSURE: 66 MMHG | OXYGEN SATURATION: 97 % | HEART RATE: 72 BPM

## 2020-02-06 DIAGNOSIS — H65.93 BILATERAL NON-SUPPURATIVE OTITIS MEDIA: ICD-10-CM

## 2020-02-06 DIAGNOSIS — J01.40 ACUTE NON-RECURRENT PANSINUSITIS: Primary | ICD-10-CM

## 2020-02-06 PROCEDURE — 99213 OFFICE O/P EST LOW 20 MIN: CPT | Performed by: NURSE PRACTITIONER

## 2020-02-06 RX ORDER — FLUTICASONE PROPIONATE 50 MCG
2 SPRAY, SUSPENSION (ML) NASAL DAILY
Qty: 1 BOTTLE | Refills: 0 | Status: SHIPPED | OUTPATIENT
Start: 2020-02-06 | End: 2020-02-20

## 2020-02-06 RX ORDER — AMOXICILLIN AND CLAVULANATE POTASSIUM 875; 125 MG/1; MG/1
1 TABLET, FILM COATED ORAL 2 TIMES DAILY
Qty: 20 TABLET | Refills: 0 | Status: SHIPPED | OUTPATIENT
Start: 2020-02-06 | End: 2020-02-16

## 2020-02-06 NOTE — PROGRESS NOTES
CHIEF COMPLAINT:   Patient presents with:  Cough/URI: ear pain on left side, headache, sinus pressure x 1 week. HPI:   Cherrie Barkley is a 62year old female who presents for sinus congestion for 1 week.  Symptoms have been worsening the last 2.5 EXCISION OF LEFT BREAST MASS   • OTHER SURGICAL HISTORY  10/2010    REMOVED A GROWTH ON TONGUE   • TONSILLECTOMY  1960'S      Family History   Problem Relation Age of Onset   • Cancer Maternal Grandmother         BREAST   • Breast Cancer Maternal Grandmot Bilateral non-suppurative otitis media    PLAN:   Meds and instructions as below. Use, dose, and possible side effects of medication discussed. Comfort care instructions as listed in Patient Instructions.     To f/u if no improvement in 3-5 days or soone · If you develop a rash, hives, itching, throat tightness, or shortness of breath while on the antibiotic or shortly after completion, please call your PCP immediately and stop your antibiotic. This may be an allergic reaction.   If your physician's office · Neck pain, stiff neck, or headache  · Fluid or blood draining from the ear canal  · Fever of 100.4°F (38°C) or as advised   · Seizure  Date Last Reviewed: 6/1/2016  © 6248-2665 [employer]. [address] [facility], [address] [place] [address].  All rig · You can use an over-the-counter decongestant, unless a similar medicine was prescribed to you. Nasal sprays work the fastest. Use one that contains phenylephrine or oxymetazoline. First blow your nose gently. Then use the spray.  Do not use these medicine · Don’t have close contact with people who have sore throats, colds, or other upper respiratory infections. · Don’t smoke, and stay away from secondhand smoke. · Stay up to date with of your vaccines.   Date Last Reviewed: 11/1/2017  © 7316-9617 [employer]

## 2020-02-06 NOTE — PATIENT INSTRUCTIONS
-   Increase oral fluids to loosen and thin secretions, eat a nutritious diet  -   Tylenol or ibuprofen for pain as packet insert   -   Robitussin DM, Mucinex DM, or generic equivalent for cough as packet insert  -   Return to clinic if symptoms persist or passage (eustachian tube) that drains fluid from the middle ear. When the middle ear fills with fluid, bacteria can grow there and cause an infection. Oral antibiotics are used to treat this illness, not ear drops.  Symptoms usually start to improve within pollens and other particles in the air. Sinusitis can cause symptoms of sinus congestion and a feeling of fullness. A sinus infection causes fever, headache, and facial pain.  There is often green or yellow fluid draining from the nose or into the back of t have chronic liver or kidney disease or ever had a stomach ulcer, talk with your healthcare provider before using these medicines. (Aspirin should never be taken by anyone under age 25 who is ill with a fever.  It may cause severe liver damage.)  · Don't sm

## 2020-02-17 ENCOUNTER — OFFICE VISIT (OUTPATIENT)
Dept: FAMILY MEDICINE CLINIC | Facility: CLINIC | Age: 65
End: 2020-02-17
Payer: COMMERCIAL

## 2020-02-17 VITALS
RESPIRATION RATE: 16 BRPM | HEART RATE: 86 BPM | TEMPERATURE: 98 F | BODY MASS INDEX: 21 KG/M2 | WEIGHT: 133.81 LBS | DIASTOLIC BLOOD PRESSURE: 76 MMHG | SYSTOLIC BLOOD PRESSURE: 120 MMHG | HEIGHT: 67 IN | OXYGEN SATURATION: 96 %

## 2020-02-17 DIAGNOSIS — J01.40 ACUTE NON-RECURRENT PANSINUSITIS: Primary | ICD-10-CM

## 2020-02-17 PROCEDURE — 99213 OFFICE O/P EST LOW 20 MIN: CPT | Performed by: PHYSICIAN ASSISTANT

## 2020-02-17 RX ORDER — DOXYCYCLINE HYCLATE 100 MG
100 TABLET ORAL 2 TIMES DAILY
Qty: 14 TABLET | Refills: 0 | Status: SHIPPED | OUTPATIENT
Start: 2020-02-17 | End: 2020-02-24 | Stop reason: ALTCHOICE

## 2020-02-17 RX ORDER — PREDNISONE 20 MG/1
40 TABLET ORAL DAILY
Qty: 6 TABLET | Refills: 0 | Status: SHIPPED | OUTPATIENT
Start: 2020-02-17 | End: 2020-02-20

## 2020-02-18 NOTE — PROGRESS NOTES
Patient presents with:  Bronchitis: dx 2 weeks ago, was given prednison   Sinus Problem: Sinus infection x 1 week was given augmentin and finished sat.  but still not feeling better, not as bad as when it started        [address] capsule (200 mg total) by mouth 3 (three) times daily as needed for cough. Take with a full glass of water and DO NOT CHEW. (Patient not taking: Reported on 2/6/2020 ), Disp: 20 capsule, Rfl: 0    Allergies:   Wellbutrin [Bupropion]    Patient Active Proble nonrecurrent  Did not fully improve with initial augmentin rx. Sent new prescription for doxycycline. Recommend supportive cares. Follow up for persistent or worsening symptoms, fevers, chills, shortness of breath or wheezing.     Patient expresses T[place]

## 2020-02-22 ENCOUNTER — TELEPHONE (OUTPATIENT)
Dept: FAMILY MEDICINE CLINIC | Facility: CLINIC | Age: 65
End: 2020-02-22

## 2020-02-22 NOTE — TELEPHONE ENCOUNTER
Patient was seen for bronchitis and sinus infection, given Doxycycline and advised to call the office if she felt she was getting worse or not going away.  Patient feels she is much worse and her last dose of meds is tomorrow

## 2020-02-22 NOTE — TELEPHONE ENCOUNTER
Seen 1 week ago for pansinusitis. Given steroid along with a 7 day antibiotic. Rosario Crew states she will finish the antibiotic tomorrow and is feeling worse.   Her facial sinuses are clogged, her throat feels thick and she has a HA on the top of her head a

## 2020-02-24 ENCOUNTER — OFFICE VISIT (OUTPATIENT)
Dept: FAMILY MEDICINE CLINIC | Facility: CLINIC | Age: 65
End: 2020-02-24
Payer: COMMERCIAL

## 2020-02-24 VITALS
TEMPERATURE: 98 F | DIASTOLIC BLOOD PRESSURE: 60 MMHG | WEIGHT: 132.19 LBS | SYSTOLIC BLOOD PRESSURE: 100 MMHG | RESPIRATION RATE: 16 BRPM | BODY MASS INDEX: 21 KG/M2 | HEART RATE: 84 BPM

## 2020-02-24 DIAGNOSIS — R51.9 RIGHT-SIDED HEADACHE: ICD-10-CM

## 2020-02-24 DIAGNOSIS — J34.89 SINUS PRESSURE: Primary | ICD-10-CM

## 2020-02-24 DIAGNOSIS — J32.9 RECURRENT SINUS INFECTIONS: ICD-10-CM

## 2020-02-24 PROCEDURE — 99214 OFFICE O/P EST MOD 30 MIN: CPT | Performed by: FAMILY MEDICINE

## 2020-02-24 RX ORDER — AMOXICILLIN AND CLAVULANATE POTASSIUM 562.5; 437.5; 62.5 MG/1; MG/1; MG/1
1 TABLET, FILM COATED, EXTENDED RELEASE ORAL 2 TIMES DAILY
Qty: 20 TABLET | Refills: 0 | Status: SHIPPED | OUTPATIENT
Start: 2020-02-24 | End: 2021-10-21 | Stop reason: WASHOUT

## 2020-02-24 NOTE — PROGRESS NOTES
Samuel Bowers is a 62year old female. S:  Patient presents today with the following concerns:  · 1st tried steroids and cough syrup thru [facility] 1/30/2020. · Then 10 days of Augmentin starting 2/6/2020.   Thought symptoms were almost gone but then retu within past year    Family History   Problem Relation Age of Onset   • Cancer Maternal Grandmother         BREAST   • Breast Cancer Maternal Grandmother 61   • Cancer Maternal Grandfather    • Cancer Father    • Thyroid Disorder Mother        REVIEW OF SYS change, worsen, co not improve. Patient verbalizes understanding of treatment plan. Spent over 25 mins with the patient and over 50% of this time is spent counseling regarding her symptoms, testing, treatment and concerns.

## 2020-02-25 ENCOUNTER — TELEPHONE (OUTPATIENT)
Dept: FAMILY MEDICINE CLINIC | Facility: CLINIC | Age: 65
End: 2020-02-25

## 2020-02-25 NOTE — TELEPHONE ENCOUNTER
Let's just start with the brain and what views of the sinuses come with this. Can always send her for the other if needed. Thanks.

## 2020-02-25 NOTE — TELEPHONE ENCOUNTER
Russ Chow calling for clarification of MRI orders. MRI brain will scan through the sinuses, but only axial views. If this is enough, can cancel the dedicated sinus MRI. If more views needed, pt will need to schedule two separate appointments.     Ext [phone]

## 2020-02-25 NOTE — TELEPHONE ENCOUNTER
Grzegorz Platt from  [facility] is calling to clarify orders Davied Shackle Order for patient     If disconnected contact Gr[place]egorz Platt [place] [phone]

## 2020-02-28 ENCOUNTER — HOSPITAL ENCOUNTER (OUTPATIENT)
Dept: MRI IMAGING | Age: 65
Discharge: HOME | End: 2020-02-28
Attending: FAMILY MEDICINE
Payer: COMMERCIAL

## 2020-02-28 DIAGNOSIS — J34.89 SINUS PRESSURE: ICD-10-CM

## 2020-02-28 DIAGNOSIS — R51.9 RIGHT-SIDED HEADACHE: ICD-10-CM

## 2020-02-28 DIAGNOSIS — J32.9 CHRONIC SINUSITIS, UNSPECIFIED LOCATION: Primary | ICD-10-CM

## 2020-02-28 DIAGNOSIS — R90.82 WHITE MATTER ABNORMALITY ON MRI OF BRAIN: ICD-10-CM

## 2020-02-28 PROCEDURE — 70553 MRI BRAIN STEM W/O & W/DYE: CPT | Performed by: FAMILY MEDICINE

## 2020-02-28 PROCEDURE — A9575 INJ GADOTERATE MEGLUMI 0.1ML: HCPCS | Performed by: FAMILY MEDICINE

## 2020-03-19 ENCOUNTER — TELEPHONE (OUTPATIENT)
Dept: NEUROLOGY | Facility: CLINIC | Age: 65
End: 2020-03-19

## 2020-03-19 NOTE — TELEPHONE ENCOUNTER
Per note below, Dr. Radha Ball spoke with pt, ok to cancel 03/2020 appointment. Appointment cancelled.

## 2020-03-19 NOTE — TELEPHONE ENCOUNTER
I called pt. Recommend pt to see me in 3-4 months. I did review her MRI brain with her, it was a normal study, no action required. She does not need to see me since she is neurologically intact and imaging study was unremarkable.   She was very appreciati

## 2020-05-20 ENCOUNTER — HOSPITAL ENCOUNTER (OUTPATIENT)
Dept: CT IMAGING | Facility: HOSPITAL | Age: 65
Discharge: HOME | End: 2020-05-20
Attending: FAMILY MEDICINE
Payer: COMMERCIAL

## 2020-05-20 DIAGNOSIS — R91.8 ABNORMAL CT LUNG SCREENING: ICD-10-CM

## 2020-08-17 ENCOUNTER — HOSPITAL ENCOUNTER (OUTPATIENT)
Dept: MAMMOGRAPHY | Age: 65
Discharge: HOME | End: 2020-08-17
Attending: ADVANCED PRACTICE MIDWIFE
Payer: COMMERCIAL

## 2020-08-17 PROCEDURE — 77067 SCR MAMMO BI INCL CAD: CPT | Performed by: ADVANCED PRACTICE MIDWIFE

## 2020-08-17 PROCEDURE — 77063 BREAST TOMOSYNTHESIS BI: CPT | Performed by: ADVANCED PRACTICE MIDWIFE

## 2021-04-14 ENCOUNTER — TELEPHONE (OUTPATIENT)
Dept: FAMILY MEDICINE CLINIC | Facility: CLINIC | Age: 66
End: 2021-04-14

## 2021-04-14 NOTE — TELEPHONE ENCOUNTER
Last office visit was February 2020 with Ted Georges, last visit with me was 2013.   White count 6.6, hemoglobin 11.4, platelet 492, sodium 140, potassium 3.9, creatinine 0.8, GFR 72, calcium 8.8, normal LFTs, TSH 2.11, total cholesterol 172, triglycerides 100,

## 2021-05-23 ENCOUNTER — HOSPITAL ENCOUNTER (OUTPATIENT)
Age: 66
Discharge: HOME | End: 2021-05-23
Payer: MEDICARE

## 2021-05-23 VITALS
TEMPERATURE: 98 F | OXYGEN SATURATION: 99 % | BODY MASS INDEX: 19.62 KG/M2 | DIASTOLIC BLOOD PRESSURE: 81 MMHG | HEART RATE: 73 BPM | SYSTOLIC BLOOD PRESSURE: 102 MMHG | HEIGHT: 67 IN | WEIGHT: 125 LBS | RESPIRATION RATE: 16 BRPM

## 2021-05-23 DIAGNOSIS — J01.00 ACUTE NON-RECURRENT MAXILLARY SINUSITIS: Primary | ICD-10-CM

## 2021-05-23 PROCEDURE — 99213 OFFICE O/P EST LOW 20 MIN: CPT | Performed by: NURSE PRACTITIONER

## 2021-05-23 RX ORDER — PREDNISONE 20 MG/1
40 TABLET ORAL DAILY
Qty: 10 TABLET | Refills: 0 | Status: SHIPPED | OUTPATIENT
Start: 2021-05-23 | End: 2021-05-28

## 2021-05-23 RX ORDER — AMOXICILLIN AND CLAVULANATE POTASSIUM 875; 125 MG/1; MG/1
1 TABLET, FILM COATED ORAL 2 TIMES DAILY
Qty: 20 TABLET | Refills: 0 | Status: SHIPPED | OUTPATIENT
Start: 2021-05-23 | End: 2021-06-02

## 2021-05-23 NOTE — ED PROVIDER NOTES
Patient Seen in: Immediate [address]      History   Patient presents with:  Sinus Problem  Stuffy Nose    Stated Complaint: congestion, headache x 7 days    HPI/Subjective:   46-year-old female presents to immediate with sinus congestion head membrane is bulging. Left Ear: Tympanic membrane is bulging. Nose: Nose normal.   Cardiovascular:      Rate and Rhythm: Normal rate. Pulmonary:      Effort: Pulmonary effort is normal.      Breath sounds: Normal breath sounds.    Abdominal: daily for 5 days. , Normal, Disp-10 tablet, R-0

## 2021-05-23 NOTE — ED INITIAL ASSESSMENT (HPI)
Pt c/o headache, sinus pressure and stuffy nose. Pt states she's had the symptoms for 7 days. Pt states she completed her [employer] covid vaccine 04/03/2021.

## 2021-06-12 ENCOUNTER — TELEPHONE (OUTPATIENT)
Dept: FAMILY MEDICINE CLINIC | Facility: CLINIC | Age: 66
End: 2021-06-12

## 2021-06-12 ENCOUNTER — HOSPITAL ENCOUNTER (OUTPATIENT)
Age: 66
Discharge: HOME | End: 2021-06-12
Payer: MEDICARE

## 2021-06-12 VITALS
SYSTOLIC BLOOD PRESSURE: 134 MMHG | OXYGEN SATURATION: 99 % | HEIGHT: 67 IN | WEIGHT: 173 LBS | DIASTOLIC BLOOD PRESSURE: 97 MMHG | RESPIRATION RATE: 16 BRPM | BODY MASS INDEX: 27.15 KG/M2 | TEMPERATURE: 98 F | HEART RATE: 74 BPM

## 2021-06-12 DIAGNOSIS — B02.9 HERPES ZOSTER WITHOUT COMPLICATION: Primary | ICD-10-CM

## 2021-06-12 PROCEDURE — 99213 OFFICE O/P EST LOW 20 MIN: CPT | Performed by: NURSE PRACTITIONER

## 2021-06-12 RX ORDER — VALACYCLOVIR HYDROCHLORIDE 1 G/1
1 TABLET, FILM COATED ORAL 3 TIMES DAILY
Qty: 21 TABLET | Refills: 0 | Status: SHIPPED | OUTPATIENT
Start: 2021-06-12 | End: 2021-06-19

## 2021-06-12 NOTE — TELEPHONE ENCOUNTER
Got rash on Wednesday getting worse with blisters she asked and I told her she should go to the Urgent care in [address] [facility] today to be seen

## 2021-06-12 NOTE — ED PROVIDER NOTES
Patient Seen in: [address]      History   Patient presents with:  Rash Skin Problem    Stated Complaint: Rash     HPI/Subjective:   HPI    69-year-old female presents for evaluation of a welt-like and blistering rash is painful and 97.8 °F (36.6 °C) (Temporal)   Resp 16   Ht 170.2 cm (5' 7\")   Wt 78.5 kg   SpO2 99%   BMI 27.10 kg/m²         Physical Exam  Vitals and nursing note reviewed. Constitutional:       General: She is not in acute distress.      Appearance: She is not toxic 0    triamcinolone acetonide 0.1 % External Cream  Apply topically 3 (three) times daily for 7 days.   Qty: 45 g Refills: 0

## 2021-06-12 NOTE — TELEPHONE ENCOUNTER
Pt has had a rash on her shoulder since Wednesday and it is getting worse she thinks someone needs to look at it wasn't sure if she should go to  or make appt for Monday.

## 2021-09-07 ENCOUNTER — HOSPITAL ENCOUNTER (OUTPATIENT)
Dept: MAMMOGRAPHY | Age: 66
Discharge: HOME | End: 2021-09-07
Attending: OBSTETRICS & GYNECOLOGY
Payer: MEDICARE

## 2021-09-07 DIAGNOSIS — Z12.31 SCREENING MAMMOGRAM, ENCOUNTER FOR: ICD-10-CM

## 2021-09-07 PROCEDURE — 77067 SCR MAMMO BI INCL CAD: CPT | Performed by: OBSTETRICS & GYNECOLOGY

## 2021-09-07 PROCEDURE — 77063 BREAST TOMOSYNTHESIS BI: CPT | Performed by: OBSTETRICS & GYNECOLOGY

## 2021-10-21 ENCOUNTER — HOSPITAL ENCOUNTER (OUTPATIENT)
Age: 66
Discharge: HOME | End: 2021-10-21
Payer: MEDICARE

## 2021-10-21 VITALS
WEIGHT: 122 LBS | DIASTOLIC BLOOD PRESSURE: 77 MMHG | HEIGHT: 67 IN | BODY MASS INDEX: 19.15 KG/M2 | HEART RATE: 67 BPM | TEMPERATURE: 99 F | RESPIRATION RATE: 14 BRPM | OXYGEN SATURATION: 97 % | SYSTOLIC BLOOD PRESSURE: 124 MMHG

## 2021-10-21 DIAGNOSIS — J01.00 ACUTE NON-RECURRENT MAXILLARY SINUSITIS: Primary | ICD-10-CM

## 2021-10-21 PROCEDURE — 99213 OFFICE O/P EST LOW 20 MIN: CPT | Performed by: PHYSICIAN ASSISTANT

## 2021-10-21 RX ORDER — AMOXICILLIN AND CLAVULANATE POTASSIUM 875; 125 MG/1; MG/1
1 TABLET, FILM COATED ORAL 2 TIMES DAILY
Qty: 20 TABLET | Refills: 0 | Status: SHIPPED | OUTPATIENT
Start: 2021-10-21 | End: 2021-10-31

## 2021-10-21 RX ORDER — FLUTICASONE PROPIONATE 50 MCG
2 SPRAY, SUSPENSION (ML) NASAL DAILY
Qty: 16 G | Refills: 0 | Status: SHIPPED | OUTPATIENT
Start: 2021-10-21 | End: 2021-11-20

## 2021-10-21 NOTE — ED PROVIDER NOTES
Patient Seen in: Immediate [address] [place], [place] [address]      History   Patient presents with:  Cough/URI    Stated Complaint: headache, sinus problem    Subjective:   HPI    71-year-old female presents immediate care for what she describes as sinusitis.   Roger Neurological: Negative for headaches. Psychiatric/Behavioral: Negative for suicidal ideas. All other systems reviewed and are negative. Positive for stated complaint: headache, sinus problem  Other systems are as noted in HPI.   Constitutional an presents immediate care for sinusitis. Patient is only 6 days out, advised her that [facility] does not recommend antibiotics for 10 days. Advised if symptoms are getting worse after 7 that we can try antibiotics, postdated Augmentin.   We will do flutic

## 2022-05-26 ENCOUNTER — TELEPHONE (OUTPATIENT)
Dept: FAMILY MEDICINE CLINIC | Facility: CLINIC | Age: 67
End: 2022-05-26

## 2022-05-26 DIAGNOSIS — Z00.00 LABORATORY EXAMINATION ORDERED AS PART OF A COMPLETE PHYSICAL EXAMINATION: Primary | ICD-10-CM

## 2022-05-26 DIAGNOSIS — E78.6 LOW HDL (UNDER 40): ICD-10-CM

## 2022-05-26 NOTE — TELEPHONE ENCOUNTER
Please enter lab orders for the patient's upcoming physical appointment. Physical scheduled: Your appointments     Date & Time Appointment Department [facility])    Jun 02, 2022  7:30 AM CDT MA Supervisit with JEREMY Harrison [facility], [address], [place]  ([facility])            Spencer Seip, [address]rey [address] Lonni Mar [address] [facility] [phone]         [facility]: [facility])     The patient has been notified to complete fasting labs prior to their physical appointment.

## 2022-05-27 NOTE — TELEPHONE ENCOUNTER
Pt called. She will be going early Tues morning for her blood work. Just asking if the orders will be in the system by then.

## 2022-05-31 ENCOUNTER — LAB ENCOUNTER (OUTPATIENT)
Dept: LAB | Facility: HOSPITAL | Age: 67
End: 2022-05-31
Attending: PHYSICIAN ASSISTANT
Payer: MEDICARE

## 2022-05-31 DIAGNOSIS — Z00.00 LABORATORY EXAMINATION ORDERED AS PART OF A COMPLETE PHYSICAL EXAMINATION: ICD-10-CM

## 2022-05-31 DIAGNOSIS — E78.6 LOW HDL (UNDER 40): ICD-10-CM

## 2022-05-31 LAB
ALBUMIN SERPL-MCNC: 3.8 G/DL (ref 3.4–5)
ALBUMIN/GLOB SERPL: 1.1 {RATIO} (ref 1–2)
ALP LIVER SERPL-CCNC: 70 U/L
ALT SERPL-CCNC: 25 U/L
ANION GAP SERPL CALC-SCNC: 5 MMOL/L (ref 0–18)
AST SERPL-CCNC: 15 U/L (ref 15–37)
BASOPHILS # BLD AUTO: 0.02 X10(3) UL (ref 0–0.2)
BASOPHILS NFR BLD AUTO: 0.3 %
BILIRUB SERPL-MCNC: 0.6 MG/DL (ref 0.1–2)
BUN BLD-MCNC: 6 MG/DL (ref 7–18)
CALCIUM BLD-MCNC: 8.7 MG/DL (ref 8.5–10.1)
CHLORIDE SERPL-SCNC: 109 MMOL/L (ref 98–112)
CHOLEST SERPL-MCNC: 170 MG/DL (ref ?–200)
CO2 SERPL-SCNC: 29 MMOL/L (ref 21–32)
CREAT BLD-MCNC: 0.79 MG/DL
EOSINOPHIL # BLD AUTO: 0.09 X10(3) UL (ref 0–0.7)
EOSINOPHIL NFR BLD AUTO: 1.4 %
ERYTHROCYTE [DISTWIDTH] IN BLOOD BY AUTOMATED COUNT: 13 %
FASTING PATIENT LIPID ANSWER: YES
FASTING STATUS PATIENT QL REPORTED: YES
GLOBULIN PLAS-MCNC: 3.4 G/DL (ref 2.8–4.4)
GLUCOSE BLD-MCNC: 91 MG/DL (ref 70–99)
HCT VFR BLD AUTO: 40.6 %
HDLC SERPL-MCNC: 58 MG/DL (ref 40–59)
HGB BLD-MCNC: 13 G/DL
IMM GRANULOCYTES # BLD AUTO: 0.01 X10(3) UL (ref 0–1)
IMM GRANULOCYTES NFR BLD: 0.2 %
LDLC SERPL CALC-MCNC: 94 MG/DL (ref ?–100)
LYMPHOCYTES # BLD AUTO: 2.21 X10(3) UL (ref 1–4)
LYMPHOCYTES NFR BLD AUTO: 34.6 %
MCH RBC QN AUTO: 31.2 PG (ref 26–34)
MCHC RBC AUTO-ENTMCNC: 32 G/DL (ref 31–37)
MCV RBC AUTO: 97.4 FL
MONOCYTES # BLD AUTO: 0.66 X10(3) UL (ref 0.1–1)
MONOCYTES NFR BLD AUTO: 10.3 %
NEUTROPHILS # BLD AUTO: 3.4 X10 (3) UL (ref 1.5–7.7)
NEUTROPHILS # BLD AUTO: 3.4 X10(3) UL (ref 1.5–7.7)
NEUTROPHILS NFR BLD AUTO: 53.2 %
NONHDLC SERPL-MCNC: 112 MG/DL (ref ?–130)
OSMOLALITY SERPL CALC.SUM OF ELEC: 293 MOSM/KG (ref 275–295)
PLATELET # BLD AUTO: 235 10(3)UL (ref 150–450)
POTASSIUM SERPL-SCNC: 4.1 MMOL/L (ref 3.5–5.1)
PROT SERPL-MCNC: 7.2 G/DL (ref 6.4–8.2)
RBC # BLD AUTO: 4.17 X10(6)UL
SODIUM SERPL-SCNC: 143 MMOL/L (ref 136–145)
TRIGL SERPL-MCNC: 97 MG/DL (ref 30–149)
TSI SER-ACNC: 3.34 MIU/ML (ref 0.36–3.74)
VLDLC SERPL CALC-MCNC: 16 MG/DL (ref 0–30)
WBC # BLD AUTO: 6.4 X10(3) UL (ref 4–11)

## 2022-05-31 PROCEDURE — 85025 COMPLETE CBC W/AUTO DIFF WBC: CPT

## 2022-05-31 PROCEDURE — 84443 ASSAY THYROID STIM HORMONE: CPT

## 2022-05-31 PROCEDURE — 80061 LIPID PANEL: CPT

## 2022-05-31 PROCEDURE — 80053 COMPREHEN METABOLIC PANEL: CPT

## 2022-05-31 PROCEDURE — 36415 COLL VENOUS BLD VENIPUNCTURE: CPT

## 2022-06-02 ENCOUNTER — OFFICE VISIT (OUTPATIENT)
Dept: FAMILY MEDICINE CLINIC | Facility: CLINIC | Age: 67
End: 2022-06-02
Payer: MEDICARE

## 2022-06-02 VITALS
BODY MASS INDEX: 19.3 KG/M2 | HEART RATE: 64 BPM | TEMPERATURE: 97 F | SYSTOLIC BLOOD PRESSURE: 100 MMHG | RESPIRATION RATE: 16 BRPM | WEIGHT: 123 LBS | HEIGHT: 67 IN | DIASTOLIC BLOOD PRESSURE: 60 MMHG

## 2022-06-02 DIAGNOSIS — J43.9 PULMONARY EMPHYSEMA, UNSPECIFIED EMPHYSEMA TYPE (HCC): ICD-10-CM

## 2022-06-02 DIAGNOSIS — Z86.010 PERSONAL HISTORY OF COLONIC POLYPS: ICD-10-CM

## 2022-06-02 DIAGNOSIS — Z00.00 ENCOUNTER FOR ANNUAL HEALTH EXAMINATION: Primary | ICD-10-CM

## 2022-06-02 DIAGNOSIS — Z87.891 HISTORY OF TOBACCO USE: ICD-10-CM

## 2022-06-02 PROCEDURE — 3074F SYST BP LT 130 MM HG: CPT | Performed by: PHYSICIAN ASSISTANT

## 2022-06-02 PROCEDURE — G0402 INITIAL PREVENTIVE EXAM: HCPCS | Performed by: PHYSICIAN ASSISTANT

## 2022-06-02 PROCEDURE — 3078F DIAST BP <80 MM HG: CPT | Performed by: PHYSICIAN ASSISTANT

## 2022-06-02 PROCEDURE — 99397 PER PM REEVAL EST PAT 65+ YR: CPT | Performed by: PHYSICIAN ASSISTANT

## 2022-06-02 PROCEDURE — 3008F BODY MASS INDEX DOCD: CPT | Performed by: PHYSICIAN ASSISTANT

## 2022-06-02 PROCEDURE — 96160 PT-FOCUSED HLTH RISK ASSMT: CPT | Performed by: PHYSICIAN ASSISTANT

## 2022-06-08 ENCOUNTER — HOSPITAL ENCOUNTER (OUTPATIENT)
Dept: CT IMAGING | Facility: HOSPITAL | Age: 67
Discharge: HOME | End: 2022-06-08
Attending: PHYSICIAN ASSISTANT
Payer: MEDICARE

## 2022-06-08 DIAGNOSIS — Z87.891 HISTORY OF TOBACCO USE: ICD-10-CM

## 2022-06-08 PROCEDURE — 71271 CT THORAX LUNG CANCER SCR C-: CPT | Performed by: PHYSICIAN ASSISTANT

## 2022-06-24 ENCOUNTER — OFFICE VISIT (OUTPATIENT)
Facility: LOCATION | Age: 67
End: 2022-06-24
Payer: MEDICARE

## 2022-06-24 DIAGNOSIS — J43.9 PULMONARY EMPHYSEMA, UNSPECIFIED EMPHYSEMA TYPE (HCC): ICD-10-CM

## 2022-06-24 DIAGNOSIS — R13.19 OTHER DYSPHAGIA: ICD-10-CM

## 2022-06-24 DIAGNOSIS — Z86.010 PERSONAL HISTORY OF COLONIC POLYPS: Primary | ICD-10-CM

## 2022-06-24 PROCEDURE — 99213 OFFICE O/P EST LOW 20 MIN: CPT | Performed by: SURGERY

## 2022-06-24 RX ORDER — POLYETHYLENE GLYCOL 3350, SODIUM CHLORIDE, SODIUM BICARBONATE, POTASSIUM CHLORIDE 420; 11.2; 5.72; 1.48 G/4L; G/4L; G/4L; G/4L
POWDER, FOR SOLUTION ORAL
Qty: 1 EACH | Refills: 0 | Status: SHIPPED | OUTPATIENT
Start: 2022-06-24

## 2022-07-08 ENCOUNTER — LAB REQUISITION (OUTPATIENT)
Age: 67
End: 2022-07-08
Payer: MEDICARE

## 2022-07-08 DIAGNOSIS — Z86.010 HISTORY OF COLON POLYPS: ICD-10-CM

## 2022-07-08 DIAGNOSIS — R47.02 DYSPHASIA: ICD-10-CM

## 2022-07-08 PROCEDURE — 88305 TISSUE EXAM BY PATHOLOGIST: CPT | Performed by: SURGERY

## 2022-10-13 ENCOUNTER — HOSPITAL ENCOUNTER (OUTPATIENT)
Dept: BONE DENSITY | Age: 67
Discharge: HOME | End: 2022-10-13
Attending: ADVANCED PRACTICE MIDWIFE
Payer: MEDICARE

## 2022-10-13 ENCOUNTER — HOSPITAL ENCOUNTER (OUTPATIENT)
Dept: MAMMOGRAPHY | Age: 67
Discharge: HOME | End: 2022-10-13
Attending: ADVANCED PRACTICE MIDWIFE
Payer: MEDICARE

## 2022-10-13 DIAGNOSIS — Z78.0 OSTEOPENIA AFTER MENOPAUSE: ICD-10-CM

## 2022-10-13 DIAGNOSIS — Z12.31 ENCOUNTER FOR SCREENING MAMMOGRAM FOR MALIGNANT NEOPLASM OF BREAST: ICD-10-CM

## 2022-10-13 DIAGNOSIS — Z78.0 POSTMENOPAUSAL: ICD-10-CM

## 2022-10-13 DIAGNOSIS — M85.80 OSTEOPENIA AFTER MENOPAUSE: ICD-10-CM

## 2022-10-13 PROCEDURE — 77080 DXA BONE DENSITY AXIAL: CPT | Performed by: ADVANCED PRACTICE MIDWIFE

## 2022-10-13 PROCEDURE — 77063 BREAST TOMOSYNTHESIS BI: CPT | Performed by: ADVANCED PRACTICE MIDWIFE

## 2022-10-13 PROCEDURE — 77067 SCR MAMMO BI INCL CAD: CPT | Performed by: ADVANCED PRACTICE MIDWIFE

## 2022-12-26 ENCOUNTER — HOSPITAL ENCOUNTER (OUTPATIENT)
Age: 67
Discharge: HOME | End: 2022-12-26
Payer: MEDICARE

## 2022-12-26 VITALS
SYSTOLIC BLOOD PRESSURE: 115 MMHG | WEIGHT: 127 LBS | OXYGEN SATURATION: 96 % | HEIGHT: 66 IN | TEMPERATURE: 98 F | HEART RATE: 86 BPM | BODY MASS INDEX: 20.41 KG/M2 | DIASTOLIC BLOOD PRESSURE: 78 MMHG | RESPIRATION RATE: 20 BRPM

## 2022-12-26 DIAGNOSIS — J40 SINOBRONCHITIS: Primary | ICD-10-CM

## 2022-12-26 DIAGNOSIS — J32.9 SINOBRONCHITIS: Primary | ICD-10-CM

## 2022-12-26 PROCEDURE — 99213 OFFICE O/P EST LOW 20 MIN: CPT | Performed by: NURSE PRACTITIONER

## 2022-12-26 RX ORDER — AMOXICILLIN AND CLAVULANATE POTASSIUM 875; 125 MG/1; MG/1
1 TABLET, FILM COATED ORAL 2 TIMES DAILY
Qty: 20 TABLET | Refills: 0 | Status: SHIPPED | OUTPATIENT
Start: 2022-12-26 | End: 2023-01-05

## 2022-12-26 NOTE — DISCHARGE INSTRUCTIONS
Increase fluids  Antibiotics as prescribed  Recommend nasal spray over-the-counter:  Flonase or Astepro nasal spray, Mucinex D, saline nasal washes  Start an antihistamine such as Xyzal, Claritin or Allegra  Follow-up with your family doctor in 2-3 days if no better  Return to ED for any worsening or persisting symptoms, shortness of breath, chest pain, dizziness, fever.

## 2023-01-01 NOTE — PROGRESS NOTES
Ori Covington is a 62year old female.     S:  Patient presents today with the following concerns:  Flank Pain (Periodic L flank and L abdominal pain)   Smoking (Began smoking again 1 year ago 1/2 to 1 PPD)   Imm/Inj (flu vaccine)     · Left upper Pat Fell PEDIATRIC GENERAL SURGERY PROGRESS NOTE    Acute respiratory failure with hypoxia        ASHLY ROMAN  |  [id number]      Patient is a 6m Female most recently s/p ECMO decannulation on 12/22      S: Patient remains intubated and sedated in PICU.     O:   Vital Signs Last 24 Hrs  T(C): 37.4 (25 Dec 2023 01:25), Max: 38.7 (25 Dec 2023 00:00)  T(F): 99.3 (25 Dec 2023 01:25), Max: 101.6 (25 Dec 2023 00:00)  HR: 164 (25 Dec 2023 02:00) (139 - 178)  BP: 76/38 (24 Dec 2023 20:00) (76/38 - 76/38)  BP(mean): 50 (24 Dec 2023 20:00) (50 - 50)  RR: 24 (25 Dec 2023 02:00) (23 - 25)  SpO2: 100% (25 Dec 2023 01:25) (93% - 100%)    Parameters below as of 25 Dec 2023 02:00  Patient On (Oxygen Delivery Method): VDR    O2 Concentration (%): 30    PHYSICAL EXAM:  GENERAL: Intubated, sedated  NECK: ECMO decannulation site c/d/i, no swelling  CHEST/LUNG: Mechanically ventilated  HEART: Regular rate and rhythm  ABDOMEN: Soft, mildly distended  EXTREMITIES: good femoral pulses                        10.4   9.16  )-----------( 113      ( 25 Dec 2023 00:15 )             31.4     12-25    139  |  106  |  6<L>  ----------------------------<  100<H>  4.2   |  19<L>  |  0.25    Ca    9.3      25 Dec 2023 00:15  Phos  7.9     12-25  Mg     1.90     12-25    TPro  6.0  /  Alb  3.7  /  TBili  0.5  /  DBili  x   /  AST  26  /  ALT  25  /  AlkPhos  128  12-25 12-23-23 @ 07:01  -  12-24-23 @ 07:00  --------------------------------------------------------  IN: 1008 mL / OUT: 1192 mL / NET: -184 mL    12-24-23 @ 07:01  -  12-25-23 @ 03:21  --------------------------------------------------------  IN: 756.5 mL / OUT: 690 mL / NET: 66.5 mL      XR appears stable, with improved stomach bubble from yesterday. Internal hemorrhoids     Personal history of colonic polyps     Diverticulosis of large intestine without diverticulitis    Family History   Problem Relation Age of Onset   • Cancer Maternal Grandmother         BREAST   • Breast Cancer Maternal Grandmother PEDIATRIC GENERAL SURGERY PROGRESS NOTE    Acute respiratory failure with hypoxia        ASHLY ROMAN  |  [id number]      Patient is a 6m Female most recently s/p ECMO decannulation on 12/22      S: Patient remains intubated and sedated in PICU.     O:   Vital Signs Last 24 Hrs  T(C): 37.4 (25 Dec 2023 01:25), Max: 38.7 (25 Dec 2023 00:00)  T(F): 99.3 (25 Dec 2023 01:25), Max: 101.6 (25 Dec 2023 00:00)  HR: 164 (25 Dec 2023 02:00) (139 - 178)  BP: 76/38 (24 Dec 2023 20:00) (76/38 - 76/38)  BP(mean): 50 (24 Dec 2023 20:00) (50 - 50)  RR: 24 (25 Dec 2023 02:00) (23 - 25)  SpO2: 100% (25 Dec 2023 01:25) (93% - 100%)    Parameters below as of 25 Dec 2023 02:00  Patient On (Oxygen Delivery Method): VDR    O2 Concentration (%): 30    PHYSICAL EXAM:  GENERAL: Intubated, sedated  NECK: ECMO decannulation site c/d/i, no swelling  CHEST/LUNG: Mechanically ventilated  HEART: Regular rate and rhythm  ABDOMEN: Soft, mildly distended  EXTREMITIES: good femoral pulses                        10.4   9.16  )-----------( 113      ( 25 Dec 2023 00:15 )             31.4     12-25    139  |  106  |  6<L>  ----------------------------<  100<H>  4.2   |  19<L>  |  0.25    Ca    9.3      25 Dec 2023 00:15  Phos  7.9     12-25  Mg     1.90     12-25    TPro  6.0  /  Alb  3.7  /  TBili  0.5  /  DBili  x   /  AST  26  /  ALT  25  /  AlkPhos  128  12-25 12-23-23 @ 07:01  -  12-24-23 @ 07:00  --------------------------------------------------------  IN: 1008 mL / OUT: 1192 mL / NET: -184 mL    12-24-23 @ 07:01  -  12-25-23 @ 03:21  --------------------------------------------------------  IN: 756.5 mL / OUT: 690 mL / NET: 66.5 mL      XR appears stable, with improved stomach bubble from yesterday. With Platelet      Vitamin D, 25-Hydroxy      Flulaval 6 months and older 0.5 ml Quad PF [04270]    Meds & Refills for this Visit:  Requested Prescriptions     Signed Prescriptions Disp Refills   • buPROPion HCl ER, SR, 150 MG Oral Tablet 12 Hr 60 tablet 1 appropriate treatment goals and methods based on the patient’s interest in and willingness to change the behavior.    Assist: We used behavior change techniques (self-help and/or counseling), to aid Brian Coronado in achieving agreed-upon goals by acquiring the s

## 2023-01-11 ENCOUNTER — HOSPITAL ENCOUNTER (OUTPATIENT)
Dept: MAMMOGRAPHY | Facility: HOSPITAL | Age: 68
Discharge: HOME | End: 2023-01-11
Attending: ADVANCED PRACTICE MIDWIFE
Payer: MEDICARE

## 2023-01-11 DIAGNOSIS — R92.2 DENSE BREASTS: ICD-10-CM

## 2023-01-11 PROCEDURE — 76641 ULTRASOUND BREAST COMPLETE: CPT | Performed by: ADVANCED PRACTICE MIDWIFE

## 2023-02-06 ENCOUNTER — HOSPITAL ENCOUNTER (OUTPATIENT)
Age: 68
Discharge: HOME | End: 2023-02-06
Payer: MEDICARE

## 2023-02-06 VITALS
SYSTOLIC BLOOD PRESSURE: 111 MMHG | OXYGEN SATURATION: 98 % | TEMPERATURE: 98 F | RESPIRATION RATE: 18 BRPM | HEART RATE: 69 BPM | DIASTOLIC BLOOD PRESSURE: 76 MMHG

## 2023-02-06 DIAGNOSIS — J01.00 ACUTE MAXILLARY SINUSITIS, RECURRENCE NOT SPECIFIED: Primary | ICD-10-CM

## 2023-02-06 PROCEDURE — 99213 OFFICE O/P EST LOW 20 MIN: CPT | Performed by: NURSE PRACTITIONER

## 2023-02-06 RX ORDER — PREDNISONE 20 MG/1
40 TABLET ORAL DAILY
Qty: 10 TABLET | Refills: 0 | Status: SHIPPED | OUTPATIENT
Start: 2023-02-06 | End: 2023-02-11

## 2023-02-06 RX ORDER — DOXYCYCLINE HYCLATE 100 MG/1
100 CAPSULE ORAL 2 TIMES DAILY
Qty: 14 CAPSULE | Refills: 0 | Status: SHIPPED | OUTPATIENT
Start: 2023-02-06 | End: 2023-02-13

## 2023-04-25 ENCOUNTER — OFFICE VISIT (OUTPATIENT)
Dept: FAMILY MEDICINE CLINIC | Facility: CLINIC | Age: 68
End: 2023-04-25
Payer: MEDICARE

## 2023-04-25 ENCOUNTER — TELEPHONE (OUTPATIENT)
Dept: FAMILY MEDICINE CLINIC | Facility: CLINIC | Age: 68
End: 2023-04-25

## 2023-04-25 VITALS
RESPIRATION RATE: 16 BRPM | DIASTOLIC BLOOD PRESSURE: 60 MMHG | TEMPERATURE: 97 F | OXYGEN SATURATION: 99 % | HEART RATE: 70 BPM | BODY MASS INDEX: 20.63 KG/M2 | SYSTOLIC BLOOD PRESSURE: 104 MMHG | WEIGHT: 133 LBS | HEIGHT: 67.2 IN

## 2023-04-25 DIAGNOSIS — E78.6 LOW HDL (UNDER 40): ICD-10-CM

## 2023-04-25 DIAGNOSIS — J43.9 PULMONARY EMPHYSEMA, UNSPECIFIED EMPHYSEMA TYPE (HCC): ICD-10-CM

## 2023-04-25 DIAGNOSIS — Z87.891 HISTORY OF TOBACCO USE: ICD-10-CM

## 2023-04-25 DIAGNOSIS — Z82.49 FAMILY HISTORY OF HEART DISEASE: ICD-10-CM

## 2023-04-25 DIAGNOSIS — Z00.00 ENCOUNTER FOR ANNUAL HEALTH EXAMINATION: Primary | ICD-10-CM

## 2023-04-25 PROBLEM — E46 PROTEIN-CALORIE MALNUTRITION, UNSPECIFIED SEVERITY (HCC): Status: ACTIVE | Noted: 2023-04-25

## 2023-04-25 PROCEDURE — 96160 PT-FOCUSED HLTH RISK ASSMT: CPT | Performed by: PHYSICIAN ASSISTANT

## 2023-04-25 PROCEDURE — 1125F AMNT PAIN NOTED PAIN PRSNT: CPT | Performed by: PHYSICIAN ASSISTANT

## 2023-04-25 PROCEDURE — G0438 PPPS, INITIAL VISIT: HCPCS | Performed by: PHYSICIAN ASSISTANT

## 2023-04-25 PROCEDURE — 3078F DIAST BP <80 MM HG: CPT | Performed by: PHYSICIAN ASSISTANT

## 2023-04-25 PROCEDURE — 3074F SYST BP LT 130 MM HG: CPT | Performed by: PHYSICIAN ASSISTANT

## 2023-04-25 PROCEDURE — 3008F BODY MASS INDEX DOCD: CPT | Performed by: PHYSICIAN ASSISTANT

## 2023-04-25 NOTE — TELEPHONE ENCOUNTER
Appointment with Jeff Santillan 4/25/2023. Ellen Nathan is asking if she should have a heart scan.     Routed to Jeff Santillan PA-C

## 2023-04-25 NOTE — PATIENT INSTRUCTIONS
[email or web address]      Wilhemena Moritz A Cleary's SCREENING SCHEDULE   Tests on this list are recommended by your physician but may not be covered, or covered at this frequency, by your insurer. Please check with your insurance carrier before scheduling to verify coverage. PREVENTATIVE SERVICES FREQUENCY &  COVERAGE DETAILS LAST COMPLETION DATE   Diabetes Screening    Fasting Blood Sugar /  Glucose    One screening every 12 months if never tested or if previously tested but not diagnosed with pre-diabetes   One screening every 6 months if diagnosed with pre-diabetes Lab Results   Component Value Date    GLU 91 05/31/2022        Cardiovascular Disease Screening    Lipid Panel  Cholesterol  Lipoprotein (HDL)  Triglycerides Covered every 5 years for all Medicare beneficiaries without apparent signs or symptoms of cardiovascular disease Lab Results   Component Value Date    CHOLEST 170 05/31/2022    HDL 58 05/31/2022    LDL 94 05/31/2022    LDLD 97 03/25/2021    TRIG 97 05/31/2022         Electrocardiogram (EKG)   Covered if needed at Welcome to Medicare, and non-screening if indicated for medical reasons 12/11/2019      Ultrasound Screening for Abdominal Aortic Aneurysm (AAA) Covered once in a lifetime for one of the following risk factors    Men who are 73-68 years old and have ever smoked    Anyone with a family history -     Colorectal Cancer Screening  Covered for ages 52-80; only need ONE of the following:    Colonoscopy   Covered every 10 years    Covered every 2 years if patient is at high risk or previous colonoscopy was abnormal 07/08/2022    Colorectal Cancer Screening due on 07/08/2027    Flexible Sigmoidoscopy   Covered every 4 years -    Fecal Occult Blood Test Covered annually -   Bone Density Screening    Bone density screening    Covered every 2 years after age 72 if diagnosed with risk of osteoporosis or estrogen deficiency.     Covered yearly for long-term glucocorticoid medication use (Steroids) Last Dexa Scan:    XR DEXA BONE DENSITOMETRY (CPT=77080) 10/13/2022      No recommendations at this time   Pap and Pelvic    Pap   Covered every 2 years for women at normal risk;  Annually if at high risk -  No recommendations at this time    Chlamydia Annually if high risk -  No recommendations at this time   Screening Mammogram    Mammogram     Recommend annually for all female patients aged 36 and older    One baseline mammogram covered for patients aged 32-38 10/13/2022    Mammogram due on 10/13/2023    Immunizations    Influenza Covered once per flu season  Please get every year -  No recommendations at this time    Pneumococcal Each vaccine (Hhwmloz89 & [id number]) covered once after 72 Prevnar 13: -    [id number]: -     Pneumococcal Vaccination(1 - PCV) Never done    Hepatitis B One screening covered for patients with certain risk factors   -  No recommendations at this time    Tetanus Toxoid Not covered by Medicare Part B unless medically necessary (cut with metal); may be covered with your pharmacy prescription benefits -    Tetanus, Diptheria and Pertusis TD and TDaP Not covered by Medicare Part B -  No recommendations at this time    Zoster Not covered by Medicare Part B; may be covered with your pharmacy  prescription benefits -  Zoster Vaccines(1 of 2) Never done       Chronic Obstructive Pulmonary Disease (COPD)    Spirometry Annually Spirometry date:

## 2023-04-25 NOTE — TELEPHONE ENCOUNTER
Pt is calling stating that Gladys Childress told her she had to get a heart scan she is not sure what it is called and did not see in my chart  Please let her know if something was scheduled

## 2023-04-26 ENCOUNTER — LAB ENCOUNTER (OUTPATIENT)
Dept: LAB | Age: 68
End: 2023-04-26
Attending: PHYSICIAN ASSISTANT
Payer: MEDICARE

## 2023-04-26 DIAGNOSIS — Z82.49 FAMILY HISTORY OF HEART DISEASE: ICD-10-CM

## 2023-04-26 DIAGNOSIS — E78.6 LOW HDL (UNDER 40): ICD-10-CM

## 2023-04-26 PROBLEM — E46 PROTEIN-CALORIE MALNUTRITION, UNSPECIFIED SEVERITY (HCC): Status: RESOLVED | Noted: 2023-04-25 | Resolved: 2023-04-26

## 2023-04-26 LAB
ALBUMIN SERPL-MCNC: 3.8 G/DL (ref 3.4–5)
ALBUMIN/GLOB SERPL: 1 {RATIO} (ref 1–2)
ALP LIVER SERPL-CCNC: 78 U/L
ALT SERPL-CCNC: 27 U/L
ANION GAP SERPL CALC-SCNC: 3 MMOL/L (ref 0–18)
AST SERPL-CCNC: 27 U/L (ref 15–37)
BASOPHILS # BLD AUTO: 0.06 X10(3) UL (ref 0–0.2)
BASOPHILS NFR BLD AUTO: 0.7 %
BILIRUB SERPL-MCNC: 0.6 MG/DL (ref 0.1–2)
BUN BLD-MCNC: 10 MG/DL (ref 7–18)
CALCIUM BLD-MCNC: 9.2 MG/DL (ref 8.5–10.1)
CHLORIDE SERPL-SCNC: 107 MMOL/L (ref 98–112)
CHOLEST SERPL-MCNC: 197 MG/DL (ref ?–200)
CO2 SERPL-SCNC: 29 MMOL/L (ref 21–32)
CREAT BLD-MCNC: 0.74 MG/DL
EOSINOPHIL # BLD AUTO: 0.1 X10(3) UL (ref 0–0.7)
EOSINOPHIL NFR BLD AUTO: 1.2 %
ERYTHROCYTE [DISTWIDTH] IN BLOOD BY AUTOMATED COUNT: 12.7 %
EST. AVERAGE GLUCOSE BLD GHB EST-MCNC: 117 MG/DL (ref 68–126)
FASTING PATIENT LIPID ANSWER: YES
FASTING STATUS PATIENT QL REPORTED: YES
GFR SERPLBLD BASED ON 1.73 SQ M-ARVRAT: 89 ML/MIN/1.73M2 (ref 60–?)
GLOBULIN PLAS-MCNC: 3.8 G/DL (ref 2.8–4.4)
GLUCOSE BLD-MCNC: 89 MG/DL (ref 70–99)
HBA1C MFR BLD: 5.7 % (ref ?–5.7)
HCT VFR BLD AUTO: 42.4 %
HDLC SERPL-MCNC: 65 MG/DL (ref 40–59)
HGB BLD-MCNC: 13.7 G/DL
IMM GRANULOCYTES # BLD AUTO: 0.03 X10(3) UL (ref 0–1)
IMM GRANULOCYTES NFR BLD: 0.4 %
LDLC SERPL CALC-MCNC: 116 MG/DL (ref ?–100)
LYMPHOCYTES # BLD AUTO: 2.06 X10(3) UL (ref 1–4)
LYMPHOCYTES NFR BLD AUTO: 24.7 %
MCH RBC QN AUTO: 30.8 PG (ref 26–34)
MCHC RBC AUTO-ENTMCNC: 32.3 G/DL (ref 31–37)
MCV RBC AUTO: 95.3 FL
MONOCYTES # BLD AUTO: 0.85 X10(3) UL (ref 0.1–1)
MONOCYTES NFR BLD AUTO: 10.2 %
NEUTROPHILS # BLD AUTO: 5.24 X10 (3) UL (ref 1.5–7.7)
NEUTROPHILS # BLD AUTO: 5.24 X10(3) UL (ref 1.5–7.7)
NEUTROPHILS NFR BLD AUTO: 62.8 %
NONHDLC SERPL-MCNC: 132 MG/DL (ref ?–130)
OSMOLALITY SERPL CALC.SUM OF ELEC: 287 MOSM/KG (ref 275–295)
PLATELET # BLD AUTO: 274 10(3)UL (ref 150–450)
POTASSIUM SERPL-SCNC: 4 MMOL/L (ref 3.5–5.1)
PROT SERPL-MCNC: 7.6 G/DL (ref 6.4–8.2)
RBC # BLD AUTO: 4.45 X10(6)UL
SODIUM SERPL-SCNC: 139 MMOL/L (ref 136–145)
T4 FREE SERPL-MCNC: 0.9 NG/DL (ref 0.8–1.7)
TRIGL SERPL-MCNC: 87 MG/DL (ref 30–149)
TSI SER-ACNC: 4.42 MIU/ML (ref 0.36–3.74)
VLDLC SERPL CALC-MCNC: 15 MG/DL (ref 0–30)
WBC # BLD AUTO: 8.3 X10(3) UL (ref 4–11)

## 2023-04-26 PROCEDURE — 85025 COMPLETE CBC W/AUTO DIFF WBC: CPT

## 2023-04-26 PROCEDURE — 80053 COMPREHEN METABOLIC PANEL: CPT

## 2023-04-26 PROCEDURE — 84439 ASSAY OF FREE THYROXINE: CPT

## 2023-04-26 PROCEDURE — 83036 HEMOGLOBIN GLYCOSYLATED A1C: CPT

## 2023-04-26 PROCEDURE — 84443 ASSAY THYROID STIM HORMONE: CPT

## 2023-04-26 PROCEDURE — 80061 LIPID PANEL: CPT

## 2023-04-26 PROCEDURE — 36415 COLL VENOUS BLD VENIPUNCTURE: CPT

## 2023-05-12 ENCOUNTER — HOSPITAL ENCOUNTER (OUTPATIENT)
Dept: CT IMAGING | Age: 68
Discharge: HOME | End: 2023-05-12
Attending: PHYSICIAN ASSISTANT

## 2023-05-12 VITALS — WEIGHT: 133 LBS | HEIGHT: 67 IN | BODY MASS INDEX: 20.88 KG/M2

## 2023-05-12 DIAGNOSIS — Z13.6 ENCOUNTER FOR SCREENING FOR CARDIOVASCULAR DISORDERS: ICD-10-CM

## 2023-07-28 ENCOUNTER — HOSPITAL ENCOUNTER (OUTPATIENT)
Age: 68
Discharge: HOME | End: 2023-07-28
Payer: MEDICARE

## 2023-07-28 VITALS
HEART RATE: 79 BPM | TEMPERATURE: 97 F | RESPIRATION RATE: 20 BRPM | SYSTOLIC BLOOD PRESSURE: 103 MMHG | HEIGHT: 68 IN | BODY MASS INDEX: 19.7 KG/M2 | DIASTOLIC BLOOD PRESSURE: 72 MMHG | OXYGEN SATURATION: 96 % | WEIGHT: 130 LBS

## 2023-07-28 DIAGNOSIS — J01.90 ACUTE SINUSITIS, RECURRENCE NOT SPECIFIED, UNSPECIFIED LOCATION: Primary | ICD-10-CM

## 2023-07-28 PROCEDURE — 99213 OFFICE O/P EST LOW 20 MIN: CPT | Performed by: NURSE PRACTITIONER

## 2023-07-28 RX ORDER — AMOXICILLIN AND CLAVULANATE POTASSIUM 875; 125 MG/1; MG/1
1 TABLET, FILM COATED ORAL 2 TIMES DAILY
Qty: 20 TABLET | Refills: 0 | Status: SHIPPED | OUTPATIENT
Start: 2023-07-28 | End: 2023-08-07

## 2023-07-28 NOTE — DISCHARGE INSTRUCTIONS
Wash hands often  Disinfect your environment, linens, electronics, etc.  Drink plenty of fluids (water, Pedialyte, etc.)  Get plenty of rest and sleep with head elevated to help with sinus drainage and throat irritation  Avoid having air blow on your face as this can worsen congestion  Do not share utensils or drinks  Salt water gargles throughout the day for sore throat  Cepacol lozenges as needed for sore throat  Alternate Ibuprofen (adult: 600mg) and Tylenol (adult: 650-1000mg) as needed for pain / body aches / fever  You may benefit from spoonfuls of honey throughout the day for cough  You may benefit from taking a decongestant (e.g. Sudafed - pseudoephedrine [behind the pharmacy counter])  You may benefit from using a humidifier and/or steam showers  You may benefit from Flonase nasal spray daily  You may benefit from taking a daily allergy medication (e.g. Zyrtec, Xyzal, etc.)  Symptoms may take a few weeks to resolve

## 2023-07-28 NOTE — ED INITIAL ASSESSMENT (HPI)
Pt has had over 2 weeks of sinus congestion scratchy throat and headaches, she has been very fatigued, and believes it to be a sinus infection

## 2023-10-15 ENCOUNTER — HOSPITAL ENCOUNTER (OUTPATIENT)
Age: 68
Discharge: HOME | End: 2023-10-15
Payer: MEDICARE

## 2023-10-15 VITALS
OXYGEN SATURATION: 97 % | RESPIRATION RATE: 18 BRPM | WEIGHT: 127 LBS | SYSTOLIC BLOOD PRESSURE: 109 MMHG | HEIGHT: 67 IN | DIASTOLIC BLOOD PRESSURE: 76 MMHG | TEMPERATURE: 98 F | BODY MASS INDEX: 19.93 KG/M2 | HEART RATE: 90 BPM
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
## 2023-10-15 PROCEDURE — 99213 OFFICE O/P EST LOW 20 MIN: CPT | Performed by: NURSE PRACTITIONER

## 2023-10-15 NOTE — ED INITIAL ASSESSMENT (HPI)
Pt c/o rash on bilateral legs and today, Pt noted the rash spread to her arms.  Pt thinks it may have started a couple days ago

## 2023-10-15 NOTE — DISCHARGE INSTRUCTIONS
Ensure proper flea treatment for your cat / pets  Wash and dry linens on high heat  Wash carpets / rugs if applicable  Wash skin with antiseptic soap  You may benefit from applying hydrocortisone ointment to bites  You may benefit from taking an antihistamine daily (e.g. Zyrtec)  Do not scratch bites

## 2023-10-16 ENCOUNTER — HOSPITAL ENCOUNTER (OUTPATIENT)
Dept: MAMMOGRAPHY | Age: 68
Discharge: HOME | End: 2023-10-16
Attending: ADVANCED PRACTICE MIDWIFE
Payer: MEDICARE

## 2023-10-16 DIAGNOSIS — Z12.31 ENCOUNTER FOR SCREENING MAMMOGRAM FOR MALIGNANT NEOPLASM OF BREAST: ICD-10-CM

## 2023-10-16 PROCEDURE — 77063 BREAST TOMOSYNTHESIS BI: CPT | Performed by: ADVANCED PRACTICE MIDWIFE

## 2023-10-16 PROCEDURE — 77067 SCR MAMMO BI INCL CAD: CPT | Performed by: ADVANCED PRACTICE MIDWIFE

## 2023-10-31 ENCOUNTER — OFFICE VISIT (OUTPATIENT)
Dept: FAMILY MEDICINE CLINIC | Facility: CLINIC | Age: 68
End: 2023-10-31

## 2023-10-31 VITALS
RESPIRATION RATE: 16 BRPM | WEIGHT: 128 LBS | OXYGEN SATURATION: 99 % | TEMPERATURE: 97 F | HEART RATE: 70 BPM | BODY MASS INDEX: 20 KG/M2 | SYSTOLIC BLOOD PRESSURE: 100 MMHG | DIASTOLIC BLOOD PRESSURE: 60 MMHG

## 2023-10-31 DIAGNOSIS — R22.2 LUMP OF SKIN OF BACK: Primary | ICD-10-CM

## 2023-10-31 PROCEDURE — 1160F RVW MEDS BY RX/DR IN RCRD: CPT | Performed by: PHYSICIAN ASSISTANT

## 2023-10-31 PROCEDURE — 3074F SYST BP LT 130 MM HG: CPT | Performed by: PHYSICIAN ASSISTANT

## 2023-10-31 PROCEDURE — 99213 OFFICE O/P EST LOW 20 MIN: CPT | Performed by: PHYSICIAN ASSISTANT

## 2023-10-31 PROCEDURE — 1159F MED LIST DOCD IN RCRD: CPT | Performed by: PHYSICIAN ASSISTANT

## 2023-10-31 PROCEDURE — 3078F DIAST BP <80 MM HG: CPT | Performed by: PHYSICIAN ASSISTANT

## 2024-03-21 ENCOUNTER — TELEPHONE (OUTPATIENT)
Dept: FAMILY MEDICINE CLINIC | Facility: CLINIC | Age: 69
End: 2024-03-21

## 2024-03-21 DIAGNOSIS — Z13.6 SCREENING FOR CARDIOVASCULAR CONDITION: ICD-10-CM

## 2024-03-21 DIAGNOSIS — Z13.1 SCREENING FOR DIABETES MELLITUS: Primary | ICD-10-CM

## 2024-03-21 DIAGNOSIS — E07.9 THYROID DISORDER: ICD-10-CM

## 2024-03-21 DIAGNOSIS — Z13.0 SCREENING FOR IRON DEFICIENCY ANEMIA: ICD-10-CM

## 2024-03-21 DIAGNOSIS — R73.9 HYPERGLYCEMIA: ICD-10-CM

## 2024-03-21 DIAGNOSIS — Z13.29 SCREENING FOR THYROID DISORDER: ICD-10-CM

## 2024-03-21 NOTE — TELEPHONE ENCOUNTER
Please enter lab orders for the patient's upcoming physical appointment.     Physical scheduled:   Your appointments       Date & Time Appointment Department ([place])    Apr 09, 2024  8:30 AM CDT MA Supervisit with Maged Liang PA [facility] ([facility])              [facility]  [address] [facility] [address] [place] [address] [place] [id number]  [phone]           Preferred lab: [facility] ([facility])     The patient has been notified to complete fasting labs prior to their physical appointment.

## 2024-03-26 ENCOUNTER — TELEPHONE (OUTPATIENT)
Dept: FAMILY MEDICINE CLINIC | Facility: CLINIC | Age: 69
End: 2024-03-26

## 2024-03-26 NOTE — TELEPHONE ENCOUNTER
We had done heart scan last year in place. Needs to be ordered at physical (insurance requires this to be discussed in person prior to ordering)

## 2024-03-26 NOTE — TELEPHONE ENCOUNTER
Called and talked to patient she is looking to get the CT lung scan done as it was not done last year.

## 2024-03-26 NOTE — TELEPHONE ENCOUNTER
Pt is calling to make sure her labs and her CT scan were ordered from Maged Liang PA-C.  I let her know the labs were ordered but the CT scan was not.  Did Maged want it ordered prior to her appt on 4/9/24?

## 2024-03-26 NOTE — TELEPHONE ENCOUNTER
Called and discussed this with the patient and she is OK with waiting to discuss this at the visit.

## 2024-03-28 ENCOUNTER — LAB ENCOUNTER (OUTPATIENT)
Dept: LAB | Facility: HOSPITAL | Age: 69
End: 2024-03-28
Attending: PHYSICIAN ASSISTANT
Payer: MEDICARE

## 2024-03-28 ENCOUNTER — HOSPITAL ENCOUNTER (OUTPATIENT)
Age: 69
Discharge: HOME | End: 2024-03-28
Payer: MEDICARE

## 2024-03-28 VITALS
OXYGEN SATURATION: 99 % | BODY MASS INDEX: 19.78 KG/M2 | HEART RATE: 78 BPM | HEIGHT: 67 IN | WEIGHT: 126 LBS | DIASTOLIC BLOOD PRESSURE: 80 MMHG | RESPIRATION RATE: 18 BRPM | SYSTOLIC BLOOD PRESSURE: 123 MMHG | TEMPERATURE: 97 F

## 2024-03-28 DIAGNOSIS — Z13.0 SCREENING FOR IRON DEFICIENCY ANEMIA: ICD-10-CM

## 2024-03-28 DIAGNOSIS — Z13.1 SCREENING FOR DIABETES MELLITUS: ICD-10-CM

## 2024-03-28 DIAGNOSIS — J01.10 ACUTE NON-RECURRENT FRONTAL SINUSITIS: Primary | ICD-10-CM

## 2024-03-28 DIAGNOSIS — Z13.6 SCREENING FOR CARDIOVASCULAR CONDITION: ICD-10-CM

## 2024-03-28 DIAGNOSIS — Z13.29 SCREENING FOR THYROID DISORDER: ICD-10-CM

## 2024-03-28 DIAGNOSIS — R73.9 HYPERGLYCEMIA: ICD-10-CM

## 2024-03-28 DIAGNOSIS — E07.9 THYROID DISORDER: ICD-10-CM

## 2024-03-28 LAB
ALBUMIN SERPL-MCNC: 3.5 G/DL (ref 3.4–5)
ALBUMIN/GLOB SERPL: 1.1 {RATIO} (ref 1–2)
ALP LIVER SERPL-CCNC: 74 U/L
ALT SERPL-CCNC: 20 U/L
ANION GAP SERPL CALC-SCNC: 3 MMOL/L (ref 0–18)
AST SERPL-CCNC: 16 U/L (ref 15–37)
BASOPHILS # BLD AUTO: 0.04 X10(3) UL (ref 0–0.2)
BASOPHILS NFR BLD AUTO: 0.6 %
BILIRUB SERPL-MCNC: 0.6 MG/DL (ref 0.1–2)
BUN BLD-MCNC: 8 MG/DL (ref 9–23)
CALCIUM BLD-MCNC: 9.1 MG/DL (ref 8.5–10.1)
CHLORIDE SERPL-SCNC: 110 MMOL/L (ref 98–112)
CHOLEST SERPL-MCNC: 165 MG/DL (ref ?–200)
CO2 SERPL-SCNC: 30 MMOL/L (ref 21–32)
CREAT BLD-MCNC: 0.69 MG/DL
EGFRCR SERPLBLD CKD-EPI 2021: 95 ML/MIN/1.73M2 (ref 60–?)
EOSINOPHIL # BLD AUTO: 0.06 X10(3) UL (ref 0–0.7)
EOSINOPHIL NFR BLD AUTO: 1 %
ERYTHROCYTE [DISTWIDTH] IN BLOOD BY AUTOMATED COUNT: 13.2 %
EST. AVERAGE GLUCOSE BLD GHB EST-MCNC: 123 MG/DL (ref 68–126)
FASTING PATIENT LIPID ANSWER: YES
FASTING STATUS PATIENT QL REPORTED: YES
GLOBULIN PLAS-MCNC: 3.2 G/DL (ref 2.8–4.4)
GLUCOSE BLD-MCNC: 91 MG/DL (ref 70–99)
HBA1C MFR BLD: 5.9 % (ref ?–5.7)
HCT VFR BLD AUTO: 40.7 %
HDLC SERPL-MCNC: 58 MG/DL (ref 40–59)
HGB BLD-MCNC: 13.5 G/DL
IMM GRANULOCYTES # BLD AUTO: 0.01 X10(3) UL (ref 0–1)
IMM GRANULOCYTES NFR BLD: 0.2 %
LDLC SERPL CALC-MCNC: 92 MG/DL (ref ?–100)
LYMPHOCYTES # BLD AUTO: 1.94 X10(3) UL (ref 1–4)
LYMPHOCYTES NFR BLD AUTO: 31.2 %
MCH RBC QN AUTO: 31.5 PG (ref 26–34)
MCHC RBC AUTO-ENTMCNC: 33.2 G/DL (ref 31–37)
MCV RBC AUTO: 95.1 FL
MONOCYTES # BLD AUTO: 0.53 X10(3) UL (ref 0.1–1)
MONOCYTES NFR BLD AUTO: 8.5 %
NEUTROPHILS # BLD AUTO: 3.64 X10 (3) UL (ref 1.5–7.7)
NEUTROPHILS # BLD AUTO: 3.64 X10(3) UL (ref 1.5–7.7)
NEUTROPHILS NFR BLD AUTO: 58.5 %
NONHDLC SERPL-MCNC: 107 MG/DL (ref ?–130)
OSMOLALITY SERPL CALC.SUM OF ELEC: 294 MOSM/KG (ref 275–295)
PLATELET # BLD AUTO: 246 10(3)UL (ref 150–450)
POTASSIUM SERPL-SCNC: 4.2 MMOL/L (ref 3.5–5.1)
PROT SERPL-MCNC: 6.7 G/DL (ref 6.4–8.2)
RBC # BLD AUTO: 4.28 X10(6)UL
SODIUM SERPL-SCNC: 143 MMOL/L (ref 136–145)
TRIGL SERPL-MCNC: 78 MG/DL (ref 30–149)
TSI SER-ACNC: 3.02 MIU/ML (ref 0.36–3.74)
VLDLC SERPL CALC-MCNC: 13 MG/DL (ref 0–30)
WBC # BLD AUTO: 6.2 X10(3) UL (ref 4–11)

## 2024-03-28 PROCEDURE — 85025 COMPLETE CBC W/AUTO DIFF WBC: CPT

## 2024-03-28 PROCEDURE — 84443 ASSAY THYROID STIM HORMONE: CPT

## 2024-03-28 PROCEDURE — 83036 HEMOGLOBIN GLYCOSYLATED A1C: CPT

## 2024-03-28 PROCEDURE — 36415 COLL VENOUS BLD VENIPUNCTURE: CPT

## 2024-03-28 PROCEDURE — 80053 COMPREHEN METABOLIC PANEL: CPT

## 2024-03-28 PROCEDURE — 99213 OFFICE O/P EST LOW 20 MIN: CPT | Performed by: NURSE PRACTITIONER

## 2024-03-28 PROCEDURE — 80061 LIPID PANEL: CPT

## 2024-03-28 RX ORDER — AMOXICILLIN AND CLAVULANATE POTASSIUM 875; 125 MG/1; MG/1
1 TABLET, FILM COATED ORAL 2 TIMES DAILY
Qty: 20 TABLET | Refills: 0 | Status: SHIPPED | OUTPATIENT
Start: 2024-03-28 | End: 2024-04-07

## 2024-03-28 RX ORDER — AZELASTINE 1 MG/ML
1 SPRAY, METERED NASAL 2 TIMES DAILY
Qty: 30 ML | Refills: 0 | Status: SHIPPED | OUTPATIENT
Start: 2024-03-28

## 2024-03-28 NOTE — ED PROVIDER NOTES
Patient Seen in: Immediate Care [facility]      History     Chief Complaint   Patient presents with    Nasal Congestion     Stated Complaint: congestion, headache    Subjective:   68-year-old female presents to the immediate care for sinus pain and pressure, ear pain and congestion  Patient states symptoms ongoing for more than 1 week.            Objective:   Past Medical History:   Diagnosis Date    Abdominal pain, epigastric     Cellulitis and abscess of unspecified site     Mastodynia     Other enthesopathy of elbow region     Pain in thoracic spine     Rash and other nonspecific skin eruption               Past Surgical History:   Procedure Laterality Date          COLONOSCOPY  2022    RAQUEL LOCALIZATION WIRE 1 SITE LEFT (CPT=19281)       atypical ductal hyperplasia    OTHER SURGICAL HISTORY      OTHER SURGICAL HISTORY  2002    EXCISION OF LEFT BREAST MASS    OTHER SURGICAL HISTORY  10/01/2010    REMOVED A GROWTH ON TONGUE    TONSILLECTOMY  1960                Social History     Socioeconomic History    Marital status:    Occupational History    Occupation: homemaker   Tobacco Use    Smoking status: Former     Packs/day: .5     Types: Cigarettes     Quit date: 2019     Years since quittin.3     Passive exposure: Never    Smokeless tobacco: Never   Vaping Use    Vaping Use: Never used   Substance and Sexual Activity    Alcohol use: No    Drug use: No   Other Topics Concern    Caffeine Concern No    Stress Concern No    Weight Concern No    Special Diet No    Exercise No    Seat Belt Yes    Self-Exams Yes     Comment: Monthly              Review of Systems   Constitutional: Negative.    Respiratory: Negative.     Cardiovascular: Negative.    Gastrointestinal: Negative.    Skin: Negative.    Neurological: Negative.        Positive for stated complaint: congestion, headache  Other systems are as noted in HPI.  Constitutional and vital signs reviewed.       All other systems reviewed and negative except as noted above.    Physical Exam     ED Triage Vitals [03/28/24 0809]   /80   Pulse 78   Resp 18   Temp 97.1 °F (36.2 °C)   Temp src Temporal   SpO2 99 %   O2 Device None (Room air)       Current:/80   Pulse 78   Temp 97.1 °F (36.2 °C) (Temporal)   Resp 18   Ht 170.2 cm (5' 7\")   Wt 57.2 kg   SpO2 99%   BMI 19.73 kg/m²         Physical Exam  Vitals and nursing note reviewed.   Constitutional:       General: She is not in acute distress.  HENT:      Head: Normocephalic.   Cardiovascular:      Rate and Rhythm: Normal rate.   Pulmonary:      Effort: Pulmonary effort is normal.   Musculoskeletal:         General: Normal range of motion.   Skin:     General: Skin is warm and dry.   Neurological:      General: No focal deficit present.      Mental Status: She is alert and oriented to person, place, and time.               ED Course   Labs Reviewed - No data to display                   MDM      Medical Decision Making  Pertinent Labs & Imaging studies reviewed. (See chart for details).  Patient coming in with sinus pain and pressure congestion, ear pain.   Differential diagnosis includes sinusitis, viral URI, COVID, flu  Labs reviewed symptoms ongoing for more than 1 week, viral testing not warranted.  Will treat for sinusitis.  Will discharge on Augmentin. Patient is comfortable with this plan.     Overall Pt looks good. Non-toxic, well-hydrated and in no respiratory distress. Vital signs are reassuring. Exam is reassuring. I do not believe pt requires and additional diagnostic studies or intervention. I believe pt can be discharged home to continue evaluation as an outpatient. Follow-up provider given. Discharge instructions given and reviewed. Return for any problems. All understand and agreewith the plan.     Please note that this report has been produced using speech recognition software and may contain errors related to that system including, but  not limited to, errors in grammar, punctuation, and spelling, as well as words and phrases that possibly may have been recognized inappropriately. If there are any questions or concerns, contact the dictating provider for clarification.       Problems Addressed:  Acute non-recurrent frontal sinusitis: acute illness or injury    Risk  OTC drugs.  Prescription drug management.        Disposition and Plan     Clinical Impression:  1. Acute non-recurrent frontal sinusitis         Disposition:  Discharge  3/28/2024  8:39 am    Follow-up:  Robin Dodson MD  [address]  [facility] [address]  [facility] [address]  [phone]                Medications Prescribed:  Discharge Medication List as of 3/28/2024  8:41 AM        START taking these medications    Details   amoxicillin clavulanate 875-125 MG Oral Tab Take 1 tablet by mouth 2 (two) times daily for 10 days., Normal, Disp-20 tablet, R-0      azelastine 0.1 % Nasal Solution 1 spray by Nasal route 2 (two) times daily., Normal, Disp-30 mL, R-0

## 2024-03-28 NOTE — DISCHARGE INSTRUCTIONS
Increase fluids  Antibiotics as prescribed  Recommend nasal spray over-the-counter:  Flonase or Astepro nasal spray, Mucinex D, saline nasal washes  Follow-up with your family doctor in 2-3 days if no better  Return to ED for any worsening or persisting symptoms, shortness of breath, chest pain, dizziness, fever.

## 2024-03-28 NOTE — ED INITIAL ASSESSMENT (HPI)
Right sided nasal congestion and headache greater than one week  Taking OTC decongestants with no relief  Now having right sided ear pain and sore throat

## 2024-04-09 ENCOUNTER — OFFICE VISIT (OUTPATIENT)
Dept: FAMILY MEDICINE CLINIC | Facility: CLINIC | Age: 69
End: 2024-04-09
Payer: MEDICARE

## 2024-04-09 VITALS
HEART RATE: 80 BPM | TEMPERATURE: 97 F | BODY MASS INDEX: 19.78 KG/M2 | WEIGHT: 126 LBS | RESPIRATION RATE: 16 BRPM | SYSTOLIC BLOOD PRESSURE: 92 MMHG | DIASTOLIC BLOOD PRESSURE: 70 MMHG | OXYGEN SATURATION: 99 % | HEIGHT: 67 IN

## 2024-04-09 DIAGNOSIS — Z72.0 TOBACCO USE: ICD-10-CM

## 2024-04-09 DIAGNOSIS — R73.03 PREDIABETES: ICD-10-CM

## 2024-04-09 DIAGNOSIS — J43.9 PULMONARY EMPHYSEMA, UNSPECIFIED EMPHYSEMA TYPE (HCC): ICD-10-CM

## 2024-04-09 DIAGNOSIS — H92.01 RIGHT EAR PAIN: ICD-10-CM

## 2024-04-09 DIAGNOSIS — Z00.00 ENCOUNTER FOR ANNUAL HEALTH EXAMINATION: Primary | ICD-10-CM

## 2024-04-09 PROBLEM — Z87.891 QUIT SMOKING WITHIN PAST YEAR: Status: RESOLVED | Noted: 2019-11-25 | Resolved: 2024-04-09

## 2024-04-09 NOTE — PROGRESS NOTES
Subjective:   Allie Chakraborty is a 68 year old female who presents for a Medicare Subsequent Annual Wellness visit (Pt already had Initial Annual Wellness)     - Right ear pain ongoing. Tx at  on 3/28 with augmentin for sinusitis. Wears mouth guard- has not been checked lately.   - 1-1.5 packs per day when younger, now smoking 0.50 pack per week or two. Has not truly quit smoking. Due for annual LD lung CT.    No other concerns today.        History/Other:   Fall Risk Assessment:   She has been screened for Falls and is low risk.      Cognitive Assessment:   Abnormal  What day of the week is this?: Correct  What month is it?: Correct  What year is it?: Correct  Recall \"Ball\": Correct  Recall \"Flag\": Correct  Recall \"Tree\": Incorrect    Functional Ability/Status:   Allie Chakraborty has a completely normal functional assessment. See flowsheet for details.        Depression Screening (PHQ-2/PHQ-9): PHQ-2 SCORE: 0  , done 4/9/2024   If you checked off any problems, how difficult have these problems made it for you to do your work, take care of things at home, or get along with other people?: Not difficult at all              Advanced Directives:   She does have a Living Will but we do NOT have it on file in Epic.    She does have a POA but we do NOT have it on file in Epic.    Discussed Advance Care Planning with patient (and family/surrogate if present). Standard forms made available to patient in After Visit Summary.      Patient Active Problem List   Diagnosis    Chronic rhinitis    TMJ (temporomandibular joint syndrome)    Low HDL (under 40)    Internal hemorrhoids    Personal history of colonic polyps    Diverticulosis of large intestine without diverticulitis    Pulmonary emphysema (HCC)    Other dysphagia     Allergies:  She is allergic to wellbutrin [bupropion].    Current Medications:  Outpatient Medications Marked as Taking for the 4/9/24 encounter (Office Visit) with Maged Liang PA   Medication Sig     azelastine 0.1 % Nasal Solution 1 spray by Nasal route 2 (two) times daily.    RESTASIS 0.05 % Ophthalmic Emulsion Apply 1 drop to eye 2 (two) times daily. 1 drop each eye twice a day.    Cholecalciferol (VITAMIN D) 1000 units Oral Tab Take by mouth daily.    Krill Oil 1000 MG Oral Cap Take by mouth daily.    Calcium Carb-Cholecalciferol 1000-800 MG-UNIT Oral Tab Take 1 tablet by mouth daily.    Multiple Vitamins-Minerals (HAIR/SKIN/NAILS OR) Take by mouth daily.      Multiple Vitamins-Minerals (WOMENS 50+ ADVANCED) Oral Cap Take 2 Tabs by mouth daily.       Medical History:  She  has a past medical history of Abdominal pain, epigastric, Cellulitis and abscess of unspecified site, Mastodynia, Other enthesopathy of elbow region, Pain in thoracic spine, and Rash and other nonspecific skin eruption.  Surgical History:  She  has a past surgical history that includes  (/); tonsillectomy (1960); other surgical history; other surgical history (2002); other surgical history (10/01/2010); garrison localization wire 1 site left (cpt=19281); and colonoscopy (2022).   Family History:  Her family history includes Breast Cancer (age of onset: 60) in her maternal grandmother; Cancer in her father, maternal grandfather, maternal grandfather, and maternal grandmother; Thyroid Disorder in her mother.  Social History:  She  reports that she quit smoking about 4 years ago. Her smoking use included cigarettes. She has never been exposed to tobacco smoke. She has never used smokeless tobacco. She reports that she does not drink alcohol and does not use drugs.    Tobacco:  She smoked tobacco in the past but quit greater than 12 months ago.  Social History     Tobacco Use   Smoking Status Former    Current packs/day: 0.00    Types: Cigarettes    Quit date: 2019    Years since quittin.4    Passive exposure: Never   Smokeless Tobacco Never          CAGE Alcohol Screen:   CAGE screening score of  0 on 4/9/2024, showing low risk of alcohol abuse.      Patient Care Team:  Robin Dodson MD as PCP - General (Family Practice)    Review of Systems     As above    Objective:   Physical Exam  General Appearance:  Alert, cooperative, no distress, appears stated age   Head:  Normocephalic, without obvious abnormality, atraumatic   Eyes:  PERRL, conjunctiva/corneas clear, EOM's intact both eyes   Ears:  Normal TM's and external ear canals, both ears   Nose: Nares normal, septum midline,mucosa normal, no drainage or sinus tenderness   Throat: Lips, mucosa, and tongue normal; teeth and gums normal   Neck: Supple, symmetrical, trachea midline, no adenopathy;  thyroid: not enlarged, symmetric, no tenderness/mass/nodules; no carotid bruit or JVD   Back:   Symmetric, no curvature, ROM normal, no CVA tenderness   Lungs:   Clear to auscultation bilaterally, respirations unlabored   Heart:  Regular rate and rhythm, S1 and S2 normal, no murmur, rub, or gallop   Abdomen:   Soft, non-tender, bowel sounds active all four quadrants,  no masses, no organomegaly   Pelvic: Deferred   Extremities: Extremities normal, atraumatic, no cyanosis or edema   Pulses: 2+ and symmetric   Skin: Skin color, texture, turgor normal, no rashes or lesions   Lymph nodes: Cervical, supraclavicular, and axillary nodes normal   Neurologic: Normal       BP 92/70   Pulse 80   Temp 97 °F (36.1 °C)   Resp 16   Ht 5' 7\" (1.702 m)   Wt 126 lb (57.2 kg)   SpO2 99%   BMI 19.73 kg/m²  Estimated body mass index is 19.73 kg/m² as calculated from the following:    Height as of this encounter: 5' 7\" (1.702 m).    Weight as of this encounter: 126 lb (57.2 kg).    Medicare Hearing Assessment:   Hearing Screening    Time taken: 4/9/2024  8:27 AM  Screening Method: Finger Rub  Finger Rub Result: Pass         Visual Acuity:   Right Eye Visual Acuity: Corrected Right Eye Chart Acuity: 20/20   Left Eye Visual Acuity: Corrected Left Eye Chart Acuity: 20/25   Both Eyes  Visual Acuity: Corrected Both Eyes Chart Acuity: 20/20   Able To Tolerate Visual Acuity: Yes        Assessment & Plan:   Allie Chakraborty is a 68 year old female who presents for a Medicare Assessment.     1. Encounter for annual health examination (Primary)  2. Prediabetes  Recommend lifestyle management.   3. Pulmonary emphysema, unspecified emphysema type (HCC)  -     CT LUNG LD SCREENING(CPT=71271); Future; Expected date: 04/09/2024  Stable without medications.  4. Tobacco use  -     CT LUNG LD SCREENING(CPT=71271); Future; Expected date: 04/09/2024  Recommend LD CT screen. Patient willing to undergo treatment.  5. Right ear pain  Suspect related to jaw pain/ clenching/ grinding. Avoid aggravating foods and drinks. Can try heat on the area of concern. Visit dentist if continues to be bothersome.     The patient indicates understanding of these issues and agrees to the plan.  Reinforced healthy diet, lifestyle, and exercise.      No follow-ups on file.     JEREMY Figueroa, 4/9/2024     Supplementary Documentation:   General Health:  In the past six months, have you lost more than 10 pounds without trying?: 2 - No  Has your appetite been poor?: No  Type of Diet: Balanced  How does the patient maintain a good energy level?: Appropriate Exercise;Stretching  How would you describe your daily physical activity?: Moderate  How would you describe your current health state?: Good  How do you maintain positive mental well-being?: Social Interaction;Visiting Friends;Visiting Family  On a scale of 0 to 10, with 0 being no pain and 10 being severe pain, what is your pain level?: 2 - (Mild)  In the past six months, have you experienced urine leakage?: 0-No  At any time do you feel concerned for the safety/well-being of yourself and/or your children, in your home or elsewhere?: No  Have you had any immunizations at another office such as Influenza, Hepatitis B, Tetanus, or Pneumococcal?: Yes         Allie Chakraborty's  SCREENING SCHEDULE   Tests on this list are recommended by your physician but may not be covered, or covered at this frequency, by your insurer.   Please check with your insurance carrier before scheduling to verify coverage.   PREVENTATIVE SERVICES FREQUENCY &  COVERAGE DETAILS LAST COMPLETION DATE   Diabetes Screening    Fasting Blood Sugar /  Glucose    One screening every 12 months if never tested or if previously tested but not diagnosed with pre-diabetes   One screening every 6 months if diagnosed with pre-diabetes Lab Results   Component Value Date    GLU 91 03/28/2024        Cardiovascular Disease Screening    Lipid Panel  Cholesterol  Lipoprotein (HDL)  Triglycerides Covered every 5 years for all Medicare beneficiaries without apparent signs or symptoms of cardiovascular disease Lab Results   Component Value Date    CHOLEST 165 03/28/2024    HDL 58 03/28/2024    LDL 92 03/28/2024    LDLD 97 03/25/2021    TRIG 78 03/28/2024         Electrocardiogram (EKG)   Covered if needed at Welcome to Medicare, and non-screening if indicated for medical reasons 12/11/2019      Ultrasound Screening for Abdominal Aortic Aneurysm (AAA) Covered once in a lifetime for one of the following risk factors    Men who are 65-75 years old and have ever smoked    Anyone with a family history -     Colorectal Cancer Screening  Covered for ages 50-85; only need ONE of the following:    Colonoscopy   Covered every 10 years    Covered every 2 years if patient is at high risk or previous colonoscopy was abnormal 06/07/2018    Health Maintenance   Topic Date Due    Colorectal Cancer Screening  06/07/2023       Flexible Sigmoidoscopy   Covered every 4 years -    Fecal Occult Blood Test Covered annually -   Bone Density Screening    Bone density screening    Covered every 2 years after age 65 if diagnosed with risk of osteoporosis or estrogen deficiency.    Covered yearly for long-term glucocorticoid medication use (Steroids) Last Dexa  Scan:    XR DEXA BONE DENSITOMETRY (CPT=77080) 10/13/2022      No recommendations at this time   Pap and Pelvic    Pap   Covered every 2 years for women at normal risk; Annually if at high risk -  No recommendations at this time    Chlamydia Annually if high risk -  No recommendations at this time   Screening Mammogram    Mammogram     Recommend annually for all female patients aged 40 and older    One baseline mammogram covered for patients aged 35-39 10/16/2023    Health Maintenance   Topic Date Due    Mammogram  10/16/2024       Immunizations    Influenza Covered once per flu season  Please get every year -  No recommendations at this time    Pneumococcal Each vaccine ([id number] & [id number]) covered once after 65 Prevnar 13: -    [id number]: -     No recommendations at this time    Hepatitis B One screening covered for patients with certain risk factors   -  No recommendations at this time    Tetanus Toxoid Not covered by Medicare Part B unless medically necessary (cut with metal); may be covered with your pharmacy prescription benefits -    Tetanus, Diptheria and Pertusis TD and TDaP Not covered by Medicare Part B -  No recommendations at this time    Zoster Not covered by Medicare Part B; may be covered with your pharmacy  prescription benefits -  Zoster Vaccines(2 of 2) due on 06/10/2023     Chronic Obstructive Pulmonary Disease (COPD)    Spirometry Annually Spirometry date:

## 2024-04-09 NOTE — PATIENT INSTRUCTIONS
Allie Chakraborty's SCREENING SCHEDULE   Tests on this list are recommended by your physician but may not be covered, or covered at this frequency, by your insurer.   Please check with your insurance carrier before scheduling to verify coverage.   PREVENTATIVE SERVICES FREQUENCY &  COVERAGE DETAILS LAST COMPLETION DATE   Diabetes Screening    Fasting Blood Sugar /  Glucose    One screening every 12 months if never tested or if previously tested but not diagnosed with pre-diabetes   One screening every 6 months if diagnosed with pre-diabetes Lab Results   Component Value Date    GLU 91 03/28/2024        Cardiovascular Disease Screening    Lipid Panel  Cholesterol  Lipoprotein (HDL)  Triglycerides Covered every 5 years for all Medicare beneficiaries without apparent signs or symptoms of cardiovascular disease Lab Results   Component Value Date    CHOLEST 165 03/28/2024    HDL 58 03/28/2024    LDL 92 03/28/2024    LDLD 97 03/25/2021    TRIG 78 03/28/2024         Electrocardiogram (EKG)   Covered if needed at Welcome to Medicare, and non-screening if indicated for medical reasons 12/11/2019      Ultrasound Screening for Abdominal Aortic Aneurysm (AAA) Covered once in a lifetime for one of the following risk factors   • Men who are 65-75 years old and have ever smoked   • Anyone with a family history -     Colorectal Cancer Screening  Covered for ages 50-85; only need ONE of the following:    Colonoscopy   Covered every 10 years    Covered every 2 years if patient is at high risk or previous colonoscopy was abnormal 07/08/2022    Health Maintenance   Topic Date Due   • Colorectal Cancer Screening  07/08/2027       Flexible Sigmoidoscopy   Covered every 4 years -    Fecal Occult Blood Test Covered annually -   Bone Density Screening    Bone density screening    Covered every 2 years after age 65 if diagnosed with risk of osteoporosis or estrogen deficiency.    Covered yearly for long-term glucocorticoid medication use  (Steroids) Last Dexa Scan:    XR DEXA BONE DENSITOMETRY (CPT=77080) 10/13/2022      No recommendations at this time   Pap and Pelvic    Pap   Covered every 2 years for women at normal risk; Annually if at high risk -  No recommendations at this time    Chlamydia Annually if high risk -  No recommendations at this time   Screening Mammogram    Mammogram     Recommend annually for all female patients aged 40 and older    One baseline mammogram covered for patients aged 35-39 10/16/2023    Health Maintenance   Topic Date Due   • Mammogram  10/16/2024       Immunizations    Influenza Covered once per flu season  Please get every year -  No recommendations at this time    Pneumococcal Each vaccine ([id number] & [id number]) covered once after 65 Prevnar 13: -    [id number]: -     No recommendations at this time    Hepatitis B One screening covered for patients with certain risk factors   -  No recommendations at this time    Tetanus Toxoid Not covered by Medicare Part B unless medically necessary (cut with metal); may be covered with your pharmacy prescription benefits -    Tetanus, Diptheria and Pertusis TD and TDaP Not covered by Medicare Part B -  No recommendations at this time    Zoster Not covered by Medicare Part B; may be covered with your pharmacy  prescription benefits -  Zoster Vaccines(2 of 2) due on 06/10/2023     Chronic Obstructive Pulmonary Disease (COPD)    Spirometry Annually Spirometry date:

## 2024-04-15 ENCOUNTER — HOSPITAL ENCOUNTER (OUTPATIENT)
Dept: CT IMAGING | Facility: HOSPITAL | Age: 69
Discharge: HOME | End: 2024-04-15
Attending: PHYSICIAN ASSISTANT
Payer: MEDICARE

## 2024-04-15 DIAGNOSIS — J43.9 PULMONARY EMPHYSEMA, UNSPECIFIED EMPHYSEMA TYPE (HCC): ICD-10-CM

## 2024-04-15 DIAGNOSIS — Z72.0 TOBACCO USE: ICD-10-CM

## 2024-04-15 PROCEDURE — 71271 CT THORAX LUNG CANCER SCR C-: CPT | Performed by: PHYSICIAN ASSISTANT

## 2024-04-24 ENCOUNTER — HOSPITAL ENCOUNTER (OUTPATIENT)
Dept: NUCLEAR MEDICINE | Facility: HOSPITAL | Age: 69
Discharge: HOME | End: 2024-04-24
Attending: PHYSICIAN ASSISTANT
Payer: MEDICARE

## 2024-04-24 DIAGNOSIS — R93.89 ABNORMAL CHEST CT: ICD-10-CM

## 2024-04-24 LAB — GLUCOSE BLD-MCNC: 80 MG/DL (ref 70–99)

## 2024-04-24 PROCEDURE — 78815 PET IMAGE W/CT SKULL-THIGH: CPT | Performed by: PHYSICIAN ASSISTANT

## 2024-04-24 PROCEDURE — 82962 GLUCOSE BLOOD TEST: CPT

## 2024-04-25 ENCOUNTER — TELEPHONE (OUTPATIENT)
Dept: FAMILY MEDICINE CLINIC | Facility: CLINIC | Age: 69
End: 2024-04-25

## 2024-04-25 DIAGNOSIS — R93.89 ABNORMAL CT OF THE CHEST: Primary | ICD-10-CM

## 2024-04-25 NOTE — TELEPHONE ENCOUNTER
1. Abnormal CT of the chest (Primary)  -     CT CHEST (CPT=71250); Future; Expected date: 04/25/2024       OK to notify. Thanks, Car Dodson MD

## 2024-07-26 ENCOUNTER — HOSPITAL ENCOUNTER (OUTPATIENT)
Dept: CT IMAGING | Facility: HOSPITAL | Age: 69
Discharge: HOME | End: 2024-07-26
Attending: FAMILY MEDICINE
Payer: MEDICARE

## 2024-07-26 DIAGNOSIS — R93.89 ABNORMAL CT OF THE CHEST: ICD-10-CM

## 2024-07-26 PROCEDURE — 71250 CT THORAX DX C-: CPT | Performed by: FAMILY MEDICINE

## 2024-09-28 ENCOUNTER — HOSPITAL ENCOUNTER (OUTPATIENT)
Age: 69
Discharge: HOME | End: 2024-09-28
Payer: MEDICARE

## 2024-09-28 VITALS
RESPIRATION RATE: 18 BRPM | SYSTOLIC BLOOD PRESSURE: 123 MMHG | OXYGEN SATURATION: 97 % | HEART RATE: 68 BPM | TEMPERATURE: 98 F | DIASTOLIC BLOOD PRESSURE: 73 MMHG

## 2024-09-28 DIAGNOSIS — R09.81 SINUS CONGESTION: Primary | ICD-10-CM

## 2024-09-28 DIAGNOSIS — H60.391 OTHER INFECTIVE ACUTE OTITIS EXTERNA OF RIGHT EAR: ICD-10-CM

## 2024-09-28 LAB — SARS-COV-2 RNA RESP QL NAA+PROBE: NOT DETECTED

## 2024-09-28 PROCEDURE — U0002 COVID-19 LAB TEST NON-CDC: HCPCS | Performed by: PHYSICIAN ASSISTANT

## 2024-09-28 PROCEDURE — 99213 OFFICE O/P EST LOW 20 MIN: CPT | Performed by: PHYSICIAN ASSISTANT

## 2024-09-28 RX ORDER — FLUTICASONE PROPIONATE 50 MCG
2 SPRAY, SUSPENSION (ML) NASAL DAILY
Qty: 16 G | Refills: 0 | Status: SHIPPED | OUTPATIENT
Start: 2024-09-28 | End: 2024-10-28

## 2024-09-28 RX ORDER — CETIRIZINE HYDROCHLORIDE, PSEUDOEPHEDRINE HYDROCHLORIDE 5; 120 MG/1; MG/1
1 TABLET, FILM COATED, EXTENDED RELEASE ORAL 2 TIMES DAILY
Qty: 14 TABLET | Refills: 0 | Status: SHIPPED | OUTPATIENT
Start: 2024-09-28 | End: 2024-10-05

## 2024-09-28 RX ORDER — HYDROCORTISONE AND ACETIC ACID 20.75; 10.375 MG/ML; MG/ML
4 SOLUTION AURICULAR (OTIC) 2 TIMES DAILY
Qty: 10 ML | Refills: 0 | Status: SHIPPED | OUTPATIENT
Start: 2024-09-28

## 2024-10-08 ENCOUNTER — TELEPHONE (OUTPATIENT)
Dept: FAMILY MEDICINE CLINIC | Facility: CLINIC | Age: 69
End: 2024-10-08

## 2024-10-08 NOTE — TELEPHONE ENCOUNTER
Pt went to  and they diagnosed her with an ear infection, it is still not going away and she said the drops are not working at all and now she is congested too. Please call.

## 2024-10-09 ENCOUNTER — OFFICE VISIT (OUTPATIENT)
Dept: FAMILY MEDICINE CLINIC | Facility: CLINIC | Age: 69
End: 2024-10-09
Payer: MEDICARE

## 2024-10-09 VITALS
SYSTOLIC BLOOD PRESSURE: 130 MMHG | DIASTOLIC BLOOD PRESSURE: 80 MMHG | WEIGHT: 127.38 LBS | HEIGHT: 67 IN | BODY MASS INDEX: 19.99 KG/M2 | HEART RATE: 70 BPM

## 2024-10-09 DIAGNOSIS — Z12.31 VISIT FOR SCREENING MAMMOGRAM: ICD-10-CM

## 2024-10-09 DIAGNOSIS — Z12.11 SCREEN FOR COLON CANCER: ICD-10-CM

## 2024-10-09 DIAGNOSIS — K64.9 HEMORRHOIDS, UNSPECIFIED HEMORRHOID TYPE: ICD-10-CM

## 2024-10-09 DIAGNOSIS — H65.01 NON-RECURRENT ACUTE SEROUS OTITIS MEDIA OF RIGHT EAR: Primary | ICD-10-CM

## 2024-10-09 PROCEDURE — 1159F MED LIST DOCD IN RCRD: CPT | Performed by: FAMILY MEDICINE

## 2024-10-09 PROCEDURE — G2211 COMPLEX E/M VISIT ADD ON: HCPCS | Performed by: FAMILY MEDICINE

## 2024-10-09 PROCEDURE — 3079F DIAST BP 80-89 MM HG: CPT | Performed by: FAMILY MEDICINE

## 2024-10-09 PROCEDURE — 1160F RVW MEDS BY RX/DR IN RCRD: CPT | Performed by: FAMILY MEDICINE

## 2024-10-09 PROCEDURE — 99213 OFFICE O/P EST LOW 20 MIN: CPT | Performed by: FAMILY MEDICINE

## 2024-10-09 PROCEDURE — 3075F SYST BP GE 130 - 139MM HG: CPT | Performed by: FAMILY MEDICINE

## 2024-10-09 PROCEDURE — 3008F BODY MASS INDEX DOCD: CPT | Performed by: FAMILY MEDICINE

## 2024-10-09 RX ORDER — AMOXICILLIN 500 MG/1
1000 CAPSULE ORAL 2 TIMES DAILY
Qty: 40 CAPSULE | Refills: 0 | Status: SHIPPED | OUTPATIENT
Start: 2024-10-09 | End: 2024-10-19

## 2024-10-09 NOTE — PROGRESS NOTES
Allie is a 69 year old female coming in for had concerns including Urgent Care F/u (Went in for outer ear infection ).    Subjective:   HPI   UC for ruight ear on 9/28.  Still hurting.  But still with pain, ocngestion, no recent antibiotic, last was 3.2024.    Objective:   /80   Pulse 70   Ht 5' 7\" (1.702 m)   Wt 127 lb 6.4 oz (57.8 kg)   BMI 19.95 kg/m²  Body mass index is 19.95 kg/m².   Physical Exam  Vitals and nursing note reviewed.   Constitutional:       General: She is not in acute distress.  HENT:      Head: Normocephalic.      Right Ear: Tympanic membrane, ear canal and external ear normal.      Left Ear: Tympanic membrane and external ear normal.      Nose: Mucosal edema present. No septal deviation or rhinorrhea.      Right Sinus: Frontal sinus tenderness present.      Mouth/Throat:      Pharynx: Posterior oropharyngeal erythema present. No oropharyngeal exudate.   Eyes:      Conjunctiva/sclera: Conjunctivae normal.   Cardiovascular:      Rate and Rhythm: Normal rate and regular rhythm.      Heart sounds: Normal heart sounds.   Pulmonary:      Effort: Pulmonary effort is normal. No respiratory distress.      Breath sounds: Normal breath sounds.   Abdominal:      Palpations: Abdomen is soft.   Musculoskeletal:      Cervical back: Normal range of motion.   Skin:     General: Skin is warm and dry.   Neurological:      Mental Status: She is alert and oriented to person, place, and time.           Wt Readings from Last 5 Encounters:   10/09/24 127 lb 6.4 oz (57.8 kg)   04/09/24 126 lb (57.2 kg)   03/28/24 126 lb (57.2 kg)   10/31/23 128 lb (58.1 kg)   10/15/23 127 lb (57.6 kg)        Assessment & Plan:   1. Non-recurrent acute serous otitis media of right ear (Primary)  -     Amoxicillin; Take 2 capsules (1,000 mg total) by mouth 2 (two) times daily for 10 days.  Dispense: 40 capsule; Refill: 0  2. Visit for screening mammogram  -     3D Mammogram Digital Screen, Bilateral (CPT=77067/96168);  Future; Expected date: 10/09/2024  3. Screen for colon cancer  -     SURGERY - INTERNAL  4. Hemorrhoids, unspecified hemorrhoid type  -     SURGERY - INTERNAL     I have discontinued Allie Chakraborty's Multiple Vitamins-Minerals (HAIR/SKIN/NAILS OR). I am also having her start on amoxicillin. Additionally, I am having her maintain her Womens 50+ Advanced, Calcium Carb-Cholecalciferol, Vitamin D, Krill Oil, Restasis, azelastine, Hydrocortisone-Acetic Acid, fluticasone propionate, and COLLAGEN OR.       Return in about 6 months (around 4/9/2025) for AHA (Annual Health Assessment) visit (Supervisit).

## 2024-10-31 ENCOUNTER — OFFICE VISIT (OUTPATIENT)
Facility: LOCATION | Age: 69
End: 2024-10-31
Payer: MEDICARE

## 2024-10-31 VITALS
DIASTOLIC BLOOD PRESSURE: 66 MMHG | SYSTOLIC BLOOD PRESSURE: 98 MMHG | BODY MASS INDEX: 19.39 KG/M2 | HEIGHT: 67.5 IN | WEIGHT: 125 LBS | OXYGEN SATURATION: 96 % | HEART RATE: 80 BPM | TEMPERATURE: 98 F

## 2024-10-31 DIAGNOSIS — K59.00 CONSTIPATION, UNSPECIFIED CONSTIPATION TYPE: ICD-10-CM

## 2024-10-31 DIAGNOSIS — K92.1 HEMATOCHEZIA: ICD-10-CM

## 2024-10-31 DIAGNOSIS — K64.8 INTERNAL BLEEDING HEMORRHOIDS: Primary | ICD-10-CM

## 2024-10-31 PROCEDURE — 1160F RVW MEDS BY RX/DR IN RCRD: CPT | Performed by: STUDENT IN AN ORGANIZED HEALTH CARE EDUCATION/TRAINING PROGRAM

## 2024-10-31 PROCEDURE — 3078F DIAST BP <80 MM HG: CPT | Performed by: STUDENT IN AN ORGANIZED HEALTH CARE EDUCATION/TRAINING PROGRAM

## 2024-10-31 PROCEDURE — 1159F MED LIST DOCD IN RCRD: CPT | Performed by: STUDENT IN AN ORGANIZED HEALTH CARE EDUCATION/TRAINING PROGRAM

## 2024-10-31 PROCEDURE — 3008F BODY MASS INDEX DOCD: CPT | Performed by: STUDENT IN AN ORGANIZED HEALTH CARE EDUCATION/TRAINING PROGRAM

## 2024-10-31 PROCEDURE — 3074F SYST BP LT 130 MM HG: CPT | Performed by: STUDENT IN AN ORGANIZED HEALTH CARE EDUCATION/TRAINING PROGRAM

## 2024-10-31 PROCEDURE — 99214 OFFICE O/P EST MOD 30 MIN: CPT | Performed by: STUDENT IN AN ORGANIZED HEALTH CARE EDUCATION/TRAINING PROGRAM

## 2024-10-31 RX ORDER — POLYETHYLENE GLYCOL 3350, SODIUM CHLORIDE, SODIUM BICARBONATE, POTASSIUM CHLORIDE 420; 11.2; 5.72; 1.48 G/4L; G/4L; G/4L; G/4L
POWDER, FOR SOLUTION ORAL
Qty: 1 EACH | Refills: 0 | Status: SHIPPED | OUTPATIENT
Start: 2024-10-31

## 2024-10-31 NOTE — H&P
New Patient Visit Note       Active Problems      1. Internal bleeding hemorrhoids    2. Hematochezia    3. Constipation, unspecified constipation type        Chief Complaint   Chief Complaint   Patient presents with    New Patient     NP - Screen for colon cancer, last colonoscopy was 5 years ago, no family history of colon cancer, mucus in stool, constipation, hemorrhoids, no other symptoms.          History of Present Illness   69 year old female who is here for evaluation of abdominal symptoms . Patient denies abdominal pain. She reports long standing constipation. She has at least one bowel movement a day however sometimes the bowel movement is hard. She does have some hemorrhoids that bulge but spontaneously retract. She reports minimal blood after wiping and at times blood in stool . She denies anorectal pain but does report discomfort after bowel movements. She had a colonoscopy in  that showed internal hemorrhoids and diverticulosis. She reports family history of polyps significant in her father and her brother. She denies colon cancer in first degree family members.         Allergies  Allie is allergic to wellbutrin [bupropion].    Past Medical / Surgical / Social / Family History    The past medical and past surgical history have been reviewed by me today.    Past Medical History:    Abdominal pain, epigastric    Cellulitis and abscess of unspecified site    Mastodynia    Other enthesopathy of elbow region    Pain in thoracic spine    Rash and other nonspecific skin eruption     Past Surgical History:   Procedure Laterality Date    Breast biopsy  2001      /    Colonoscopy  2022    Angelia localization wire 1 site left (cpt=19281)       atypical ductal hyperplasia    Other surgical history      Other surgical history  2002    EXCISION OF LEFT BREAST MASS    Other surgical history  10/01/2010    REMOVED A GROWTH ON TONGUE    Tonsillectomy  1960       The  family history and social history have been reviewed by me today.    Family History   Problem Relation Age of Onset    Cancer Maternal Grandmother         BREAST    Breast Cancer Maternal Grandmother 60    Cancer Maternal Grandfather     Cancer Maternal Grandfather     Cancer Father     Thyroid Disorder Mother      Social History     Socioeconomic History    Marital status:    Occupational History    Occupation: homemaker   Tobacco Use    Smoking status: Passive Smoke Exposure - Never Smoker     Passive exposure: Never    Smokeless tobacco: Never   Vaping Use    Vaping status: Never Used   Substance and Sexual Activity    Alcohol use: No    Drug use: No   Other Topics Concern    Caffeine Concern No    Stress Concern No    Weight Concern No    Special Diet No    Exercise No    Seat Belt Yes    Self-Exams Yes     Comment: Monthly        Current Outpatient Medications:     PEG 3350-KCl-Na Bicarb-NaCl (TRILYTE) 420 g Oral Recon Soln, Starting at 4:00 pm the night before procedure, drink 8 ounces of the prep every 15-20 minutes until finished, Disp: 1 each, Rfl: 0    COLLAGEN OR, Take by mouth., Disp: , Rfl:     RESTASIS 0.05 % Ophthalmic Emulsion, Apply 1 drop to eye 2 (two) times daily. 1 drop each eye twice a day., Disp: , Rfl: 4    Cholecalciferol (VITAMIN D) 1000 units Oral Tab, Take by mouth daily., Disp: , Rfl:     Krill Oil 1000 MG Oral Cap, Take by mouth daily., Disp: , Rfl:     Calcium Carb-Cholecalciferol 1000-800 MG-UNIT Oral Tab, Take 1 tablet by mouth daily., Disp: , Rfl:     Multiple Vitamins-Minerals (WOMENS 50+ ADVANCED) Oral Cap, Take 2 Tabs by mouth daily., Disp: , Rfl:       Review of Systems  The Review of Systems has been reviewed by me during today.  Review of Systems   Constitutional:  Negative for chills, diaphoresis, fatigue and fever.   HENT:  Negative for ear discharge, ear pain and sore throat.    Eyes:  Negative for pain and discharge.   Respiratory:  Negative for cough, chest  tightness and shortness of breath.    Cardiovascular:  Negative for chest pain, palpitations and leg swelling.   Gastrointestinal:  Positive for anal bleeding, blood in stool and constipation. Negative for abdominal distention, abdominal pain, diarrhea, nausea, rectal pain and vomiting.   Genitourinary:  Negative for dysuria, frequency, hematuria and urgency.   Skin:  Negative for color change, pallor and rash.   Neurological:  Negative for weakness, light-headedness, numbness and headaches.   Hematological:  Negative for adenopathy. Does not bruise/bleed easily.   Psychiatric/Behavioral:  Negative for agitation and confusion.        Physical Findings   BP 98/66 (BP Location: Right arm, Patient Position: Sitting, Cuff Size: adult)   Pulse 80   Temp 98.2 °F (36.8 °C) (Temporal)   Ht 67.5\"   Wt 125 lb (56.7 kg)   SpO2 96%   BMI 19.29 kg/m²   Physical Exam  Constitutional:       Appearance: Normal appearance.   HENT:      Head: Normocephalic and atraumatic.   Cardiovascular:      Pulses: Normal pulses.   Pulmonary:      Effort: Pulmonary effort is normal.   Skin:     General: Skin is warm.      Capillary Refill: Capillary refill takes less than 2 seconds.   Neurological:      Mental Status: She is alert and oriented to person, place, and time. Mental status is at baseline.         The perineum and perianal skin were examined.   There was enlarged external hemorrhoids.    There is a small external hemorrhoid in the anterior midline that is non tender    Digital rectal exam was performed. There was  good resting anal sphincter tone and good squeeze strength.    No palpable mass.     Anoscopy was performed. The anal canal and anorectal ring were examined circumferentially.   There was not anal fissure.  There was not anal fistula internal opening.   There was internal hemorrhoid enlargement medium in size.   There was not blood or mucus seen.          Assessment/Plan  1. Internal bleeding hemorrhoids    2.  Hematochezia    3. Constipation, unspecified constipation type        Allie Chakraborty is a 69 year old female referred by Robin Dodson MD for evaluation of hematochezia, constipation and possible colonoscopy. She has internal hemorrhoids which are likely the culprit of her hematochezia however colonoscopy is indicated to rule out sinister pathology in the colon. The patient should consume 25 - 30g of fiber daily, drink plenty of water daily, take a daily fiber supplement such as Metamucil, avoid straining with bowel movements, avoid extended periods of time on the toilet. I also discussed with her treatment strategies for the internal hemorrhoids and recommend proceeding with hemorrhoidal banding. The benefits of colonoscopy, including detection of cancer and polyp removal prior to progression to cancer were discussed. The details of the procedure which include navigation of the colonoscope through the anus, rectum, and colon to evaluate the mucosa and removal of any polyps encountered were discussed as well. The risks of colonoscopy were discussed with the patient and include but are not limited to post-procedure bleeding, infection, colorectal perforation, splenic injury, missed polyps, need for additional surgeries or interventions. All questions were answered and the patient voiced understanding and agreed to proceed with colonoscopy.        Alberta Hoyt MD

## 2024-11-04 ENCOUNTER — OFFICE VISIT (OUTPATIENT)
Dept: FAMILY MEDICINE CLINIC | Facility: CLINIC | Age: 69
End: 2024-11-04
Payer: MEDICARE

## 2024-11-04 VITALS
TEMPERATURE: 98 F | HEIGHT: 67 IN | OXYGEN SATURATION: 97 % | WEIGHT: 127 LBS | DIASTOLIC BLOOD PRESSURE: 60 MMHG | HEART RATE: 89 BPM | BODY MASS INDEX: 19.93 KG/M2 | SYSTOLIC BLOOD PRESSURE: 90 MMHG

## 2024-11-04 DIAGNOSIS — M67.40 GANGLION CYST: Primary | ICD-10-CM

## 2024-11-04 PROCEDURE — 3078F DIAST BP <80 MM HG: CPT | Performed by: STUDENT IN AN ORGANIZED HEALTH CARE EDUCATION/TRAINING PROGRAM

## 2024-11-04 PROCEDURE — 1160F RVW MEDS BY RX/DR IN RCRD: CPT | Performed by: STUDENT IN AN ORGANIZED HEALTH CARE EDUCATION/TRAINING PROGRAM

## 2024-11-04 PROCEDURE — 1159F MED LIST DOCD IN RCRD: CPT | Performed by: STUDENT IN AN ORGANIZED HEALTH CARE EDUCATION/TRAINING PROGRAM

## 2024-11-04 PROCEDURE — 3074F SYST BP LT 130 MM HG: CPT | Performed by: STUDENT IN AN ORGANIZED HEALTH CARE EDUCATION/TRAINING PROGRAM

## 2024-11-04 PROCEDURE — 3008F BODY MASS INDEX DOCD: CPT | Performed by: STUDENT IN AN ORGANIZED HEALTH CARE EDUCATION/TRAINING PROGRAM

## 2024-11-04 PROCEDURE — 99213 OFFICE O/P EST LOW 20 MIN: CPT | Performed by: STUDENT IN AN ORGANIZED HEALTH CARE EDUCATION/TRAINING PROGRAM

## 2024-11-04 NOTE — PROGRESS NOTES
[facility]    Chief Complaint   Patient presents with    Follow - Up     Check right hand ring finger, first noticed x one week ago, bruising, doesn't remember any injury that caused it        HPI:   Allie Chakraborty is 69 year old patient presenting for the followin. Bump right 4th finger. No injury. Some discomfort. Small amt of bruising around it. Feels it is growing in size. Has not noticed change in sensation of the finger.         PMH:  Patient Active Problem List   Diagnosis    Chronic rhinitis    TMJ (temporomandibular joint syndrome)    Low HDL (under 40)    Internal hemorrhoids    Personal history of colonic polyps    Diverticulosis of large intestine without diverticulitis    Pulmonary emphysema (HCC)    Other dysphagia     Med Hx         SH: reviewed     FH: reviewed        ROS: full 10 point review of systems done and otherwise negative.      Healthcare Maintenance:  Health Maintenance   Topic Date Due    Colorectal Cancer Screening  2023    Zoster Vaccines (2 of 2) 06/10/2023    COVID-19 Vaccine ( season) 2024    Influenza Vaccine (1) 10/01/2024    Mammogram  10/16/2024    DEXA Scan  Completed    MA Annual Health Assessment  Completed    Annual Depression Screening  Completed    Fall Risk Screening (Annual)  Completed    Pneumococcal Vaccine: 65+ Years  Completed          PE:  Vital Signs    24 1130   BP: 90/60   Pulse: 89   Temp: 98.1 °F (36.7 °C)     Wt Readings from Last 3 Encounters:   24 127 lb (57.6 kg)   10/31/24 125 lb (56.7 kg)   10/09/24 127 lb 6.4 oz (57.8 kg)     Body mass index is 19.89 kg/m².     Physical Exam:  GEN: Well-appearing, NAD, nontoxic  CARD: RRR, no m/r/g  PULM: comfortable wob  ABD: soft, nt, nd  EXT: Right 4th finger palmar aspect pea-sized lump transilluminates. Mild bruising. Neurovascularly intact  NEURO: no gross deficit  PSYCH: normal affect, thought process linear     No visits with results within 4  Week(s) from this visit.   Latest known visit with results is:   Admission on 2024, Discharged on 2024   Component Date Value Ref Range Status    Rapid SARS-CoV-2 by PCR 2024 Not Detected  Not Detected Final        A/P: Allie Chakraborty is 69 year old presenting for the followin. Ganglion cyst - recommend compression. Too small to drain. If no improvement after weeks of compression can schedule surgical visit. RTC precautions advised.  - ORTHOPEDIC - INTERNAL          Encounter Medications[1]        Side effects, risks and benefits of medications were explained.  The patient or responsible adult showed the ability to learn, asked appropriate questions.  There were no barriers to learning and they verbalized understanding of the treatment plan.     Medication list provided to patient and /or family member.        Iris Souza MD         [1]   Outpatient Encounter Medications as of 2024   Medication Sig Dispense Refill    COLLAGEN OR Take by mouth.      RESTASIS 0.05 % Ophthalmic Emulsion Apply 1 drop to eye 2 (two) times daily. 1 drop each eye twice a day.  4    Cholecalciferol (VITAMIN D) 1000 units Oral Tab Take by mouth daily.      Krill Oil 1000 MG Oral Cap Take by mouth daily.      Calcium Carb-Cholecalciferol 1000-800 MG-UNIT Oral Tab Take 1 tablet by mouth daily.      Multiple Vitamins-Minerals (WOMENS 50+ ADVANCED) Oral Cap Take 2 Tabs by mouth daily.      PEG 3350-KCl-Na Bicarb-NaCl (TRILYTE) 420 g Oral Recon Soln Starting at 4:00 pm the night before procedure, drink 8 ounces of the prep every 15-20 minutes until finished (Patient not taking: Reported on 2024) 1 each 0     No facility-administered encounter medications on file as of 2024.

## 2024-11-13 ENCOUNTER — HOSPITAL ENCOUNTER (OUTPATIENT)
Dept: MAMMOGRAPHY | Age: 69
Discharge: HOME | End: 2024-11-13
Attending: FAMILY MEDICINE
Payer: MEDICARE

## 2024-11-13 DIAGNOSIS — Z12.31 VISIT FOR SCREENING MAMMOGRAM: ICD-10-CM

## 2024-11-13 PROCEDURE — 77067 SCR MAMMO BI INCL CAD: CPT | Performed by: FAMILY MEDICINE

## 2024-11-13 PROCEDURE — 77063 BREAST TOMOSYNTHESIS BI: CPT | Performed by: FAMILY MEDICINE

## 2024-11-18 ENCOUNTER — OFFICE VISIT (OUTPATIENT)
Facility: LOCATION | Age: 69
End: 2024-11-18
Payer: MEDICARE

## 2024-11-18 VITALS
HEART RATE: 74 BPM | RESPIRATION RATE: 18 BRPM | SYSTOLIC BLOOD PRESSURE: 104 MMHG | OXYGEN SATURATION: 96 % | DIASTOLIC BLOOD PRESSURE: 69 MMHG | TEMPERATURE: 97 F

## 2024-11-18 DIAGNOSIS — K64.8 INTERNAL BLEEDING HEMORRHOIDS: Primary | ICD-10-CM

## 2024-11-18 PROCEDURE — 1159F MED LIST DOCD IN RCRD: CPT | Performed by: STUDENT IN AN ORGANIZED HEALTH CARE EDUCATION/TRAINING PROGRAM

## 2024-11-18 PROCEDURE — 3078F DIAST BP <80 MM HG: CPT | Performed by: STUDENT IN AN ORGANIZED HEALTH CARE EDUCATION/TRAINING PROGRAM

## 2024-11-18 PROCEDURE — 46221 LIGATION OF HEMORRHOID(S): CPT | Performed by: STUDENT IN AN ORGANIZED HEALTH CARE EDUCATION/TRAINING PROGRAM

## 2024-11-18 PROCEDURE — 1160F RVW MEDS BY RX/DR IN RCRD: CPT | Performed by: STUDENT IN AN ORGANIZED HEALTH CARE EDUCATION/TRAINING PROGRAM

## 2024-11-18 PROCEDURE — 1170F FXNL STATUS ASSESSED: CPT | Performed by: STUDENT IN AN ORGANIZED HEALTH CARE EDUCATION/TRAINING PROGRAM

## 2024-11-18 PROCEDURE — 3074F SYST BP LT 130 MM HG: CPT | Performed by: STUDENT IN AN ORGANIZED HEALTH CARE EDUCATION/TRAINING PROGRAM

## 2024-12-03 NOTE — PROCEDURES
Pre op diagnosis: Internal Hemorrhoids    Post op diagnosis: Same    Procedure: Anoscopy with O-ring Rubber Band Ligation of Internal Hemorrhoids    Surgeon: Alberta Hoyt MD     History of present illness:   This patient has internal hemorrhoids that are symptomatic    Operative findings:   The right anterior internal hemorrhoid was successfully ligated in the standard fashion with an O-ring ligator.      Operative summary:  The patient was draped and exposed in the usual fashion.    A medical assistant was present at all times.    External visualization of the perineum and gluteal cleft was performed.    Digital exam was performed with a well lubricated examining finger.    Diagnostic Anoscopy was performed with lubrication.    The anal canal was well visualized.    An internal hemorrhoid was identified and well visualized.    The internal hemorrhoid was grasped well above the dentate line with the grasper.    The internal hemorrhoid was pulled within the O-ring ligator.    The O-ring ligator was fired without complication.    A 5% phenol solution was injected into the hemorrhoid and at its base.    The patient tolerated the procedure and was observed in our office for at least 5 minutes prior to discharge.    All findings are listed in the physical exam section of this note.

## 2024-12-04 ENCOUNTER — OFFICE VISIT (OUTPATIENT)
Facility: LOCATION | Age: 69
End: 2024-12-04
Payer: MEDICARE

## 2024-12-04 VITALS
HEART RATE: 70 BPM | SYSTOLIC BLOOD PRESSURE: 119 MMHG | TEMPERATURE: 97 F | OXYGEN SATURATION: 98 % | HEIGHT: 67 IN | BODY MASS INDEX: 19.93 KG/M2 | WEIGHT: 127 LBS | DIASTOLIC BLOOD PRESSURE: 75 MMHG

## 2024-12-04 DIAGNOSIS — R10.13 EPIGASTRIC PAIN: ICD-10-CM

## 2024-12-04 DIAGNOSIS — K80.20 GALLSTONES: Primary | ICD-10-CM

## 2024-12-04 PROCEDURE — 1111F DSCHRG MED/CURRENT MED MERGE: CPT | Performed by: STUDENT IN AN ORGANIZED HEALTH CARE EDUCATION/TRAINING PROGRAM

## 2024-12-04 PROCEDURE — 3008F BODY MASS INDEX DOCD: CPT | Performed by: STUDENT IN AN ORGANIZED HEALTH CARE EDUCATION/TRAINING PROGRAM

## 2024-12-04 PROCEDURE — 3074F SYST BP LT 130 MM HG: CPT | Performed by: STUDENT IN AN ORGANIZED HEALTH CARE EDUCATION/TRAINING PROGRAM

## 2024-12-04 PROCEDURE — 3078F DIAST BP <80 MM HG: CPT | Performed by: STUDENT IN AN ORGANIZED HEALTH CARE EDUCATION/TRAINING PROGRAM

## 2024-12-04 PROCEDURE — 1160F RVW MEDS BY RX/DR IN RCRD: CPT | Performed by: STUDENT IN AN ORGANIZED HEALTH CARE EDUCATION/TRAINING PROGRAM

## 2024-12-04 PROCEDURE — 1159F MED LIST DOCD IN RCRD: CPT | Performed by: STUDENT IN AN ORGANIZED HEALTH CARE EDUCATION/TRAINING PROGRAM

## 2024-12-04 PROCEDURE — 46221 LIGATION OF HEMORRHOID(S): CPT | Performed by: STUDENT IN AN ORGANIZED HEALTH CARE EDUCATION/TRAINING PROGRAM

## 2024-12-04 PROCEDURE — 99212 OFFICE O/P EST SF 10 MIN: CPT | Performed by: STUDENT IN AN ORGANIZED HEALTH CARE EDUCATION/TRAINING PROGRAM

## 2024-12-04 RX ORDER — TRIAMCINOLONE ACETONIDE 1 MG/G
CREAM TOPICAL
COMMUNITY
Start: 2024-11-14 | End: 2024-12-09

## 2024-12-07 ENCOUNTER — APPOINTMENT (OUTPATIENT)
Dept: GENERAL RADIOLOGY | Facility: HOSPITAL | Age: 69
End: 2024-12-07
Attending: EMERGENCY MEDICINE
Payer: MEDICARE

## 2024-12-07 ENCOUNTER — APPOINTMENT (OUTPATIENT)
Dept: CT IMAGING | Facility: HOSPITAL | Age: 69
End: 2024-12-07
Attending: EMERGENCY MEDICINE
Payer: MEDICARE

## 2024-12-07 ENCOUNTER — APPOINTMENT (OUTPATIENT)
Dept: GENERAL RADIOLOGY | Facility: HOSPITAL | Age: 69
End: 2024-12-07
Attending: PHYSICIAN ASSISTANT
Payer: MEDICARE

## 2024-12-07 ENCOUNTER — HOSPITAL ENCOUNTER (INPATIENT)
Facility: HOSPITAL | Age: 69
LOS: 2 days | Discharge: HOME | End: 2024-12-09
Attending: EMERGENCY MEDICINE | Admitting: HOSPITALIST
Payer: MEDICARE

## 2024-12-07 DIAGNOSIS — K56.609 SBO (SMALL BOWEL OBSTRUCTION) (HCC): Primary | ICD-10-CM

## 2024-12-07 LAB
ALBUMIN SERPL-MCNC: 4.1 G/DL (ref 3.2–4.8)
ALBUMIN/GLOB SERPL: 1.5 {RATIO} (ref 1–2)
ALP LIVER SERPL-CCNC: 72 U/L
ALT SERPL-CCNC: 17 U/L
ANION GAP SERPL CALC-SCNC: 7 MMOL/L (ref 0–18)
AST SERPL-CCNC: 26 U/L (ref ?–34)
ATRIAL RATE: 60 BPM
BASOPHILS # BLD AUTO: 0.03 X10(3) UL (ref 0–0.2)
BASOPHILS NFR BLD AUTO: 0.3 %
BILIRUB SERPL-MCNC: 0.7 MG/DL (ref 0.2–1.1)
BUN BLD-MCNC: 8 MG/DL (ref 9–23)
CALCIUM BLD-MCNC: 9.8 MG/DL (ref 8.7–10.4)
CHLORIDE SERPL-SCNC: 102 MMOL/L (ref 98–112)
CO2 SERPL-SCNC: 28 MMOL/L (ref 21–32)
CREAT BLD-MCNC: 0.7 MG/DL
EGFRCR SERPLBLD CKD-EPI 2021: 94 ML/MIN/1.73M2 (ref 60–?)
EOSINOPHIL # BLD AUTO: 0.05 X10(3) UL (ref 0–0.7)
EOSINOPHIL NFR BLD AUTO: 0.5 %
ERYTHROCYTE [DISTWIDTH] IN BLOOD BY AUTOMATED COUNT: 13.1 %
GLOBULIN PLAS-MCNC: 2.7 G/DL (ref 2–3.5)
GLUCOSE BLD-MCNC: 114 MG/DL (ref 70–99)
HCT VFR BLD AUTO: 38.8 %
HGB BLD-MCNC: 13.1 G/DL
IMM GRANULOCYTES # BLD AUTO: 0.01 X10(3) UL (ref 0–1)
IMM GRANULOCYTES NFR BLD: 0.1 %
LIPASE SERPL-CCNC: 42 U/L (ref 12–53)
LYMPHOCYTES # BLD AUTO: 2.06 X10(3) UL (ref 1–4)
LYMPHOCYTES NFR BLD AUTO: 22.2 %
MCH RBC QN AUTO: 31.3 PG (ref 26–34)
MCHC RBC AUTO-ENTMCNC: 33.8 G/DL (ref 31–37)
MCV RBC AUTO: 92.6 FL
MONOCYTES # BLD AUTO: 0.79 X10(3) UL (ref 0.1–1)
MONOCYTES NFR BLD AUTO: 8.5 %
NEUTROPHILS # BLD AUTO: 6.34 X10 (3) UL (ref 1.5–7.7)
NEUTROPHILS # BLD AUTO: 6.34 X10(3) UL (ref 1.5–7.7)
NEUTROPHILS NFR BLD AUTO: 68.4 %
OSMOLALITY SERPL CALC.SUM OF ELEC: 283 MOSM/KG (ref 275–295)
P AXIS: 67 DEGREES
P-R INTERVAL: 156 MS
PLATELET # BLD AUTO: 228 10(3)UL (ref 150–450)
POTASSIUM SERPL-SCNC: 3.6 MMOL/L (ref 3.5–5.1)
PROT SERPL-MCNC: 6.8 G/DL (ref 5.7–8.2)
Q-T INTERVAL: 402 MS
QRS DURATION: 78 MS
QTC CALCULATION (BEZET): 402 MS
R AXIS: 23 DEGREES
RBC # BLD AUTO: 4.19 X10(6)UL
SODIUM SERPL-SCNC: 137 MMOL/L (ref 136–145)
T AXIS: 53 DEGREES
TROPONIN I SERPL HS-MCNC: 3 NG/L
VENTRICULAR RATE: 60 BPM
WBC # BLD AUTO: 9.3 X10(3) UL (ref 4–11)

## 2024-12-07 PROCEDURE — 99222 1ST HOSP IP/OBS MODERATE 55: CPT | Performed by: HOSPITALIST

## 2024-12-07 PROCEDURE — 71045 X-RAY EXAM CHEST 1 VIEW: CPT | Performed by: PHYSICIAN ASSISTANT

## 2024-12-07 PROCEDURE — 99222 1ST HOSP IP/OBS MODERATE 55: CPT | Performed by: SURGERY

## 2024-12-07 PROCEDURE — 71045 X-RAY EXAM CHEST 1 VIEW: CPT | Performed by: EMERGENCY MEDICINE

## 2024-12-07 PROCEDURE — 74177 CT ABD & PELVIS W/CONTRAST: CPT | Performed by: EMERGENCY MEDICINE

## 2024-12-07 RX ORDER — SODIUM CHLORIDE 9 MG/ML
INJECTION, SOLUTION INTRAVENOUS CONTINUOUS
Status: DISCONTINUED | OUTPATIENT
Start: 2024-12-07 | End: 2024-12-09

## 2024-12-07 RX ORDER — POLYETHYLENE GLYCOL 3350 17 G/17G
17 POWDER, FOR SOLUTION ORAL DAILY PRN
Status: DISCONTINUED | OUTPATIENT
Start: 2024-12-07 | End: 2024-12-09

## 2024-12-07 RX ORDER — ONDANSETRON 2 MG/ML
4 INJECTION INTRAMUSCULAR; INTRAVENOUS ONCE
Status: COMPLETED | OUTPATIENT
Start: 2024-12-07 | End: 2024-12-07

## 2024-12-07 RX ORDER — ACETAMINOPHEN 500 MG
500 TABLET ORAL EVERY 4 HOURS PRN
Status: DISCONTINUED | OUTPATIENT
Start: 2024-12-07 | End: 2024-12-09

## 2024-12-07 RX ORDER — HEPARIN SODIUM 5000 [USP'U]/ML
5000 INJECTION, SOLUTION INTRAVENOUS; SUBCUTANEOUS EVERY 8 HOURS SCHEDULED
Status: DISCONTINUED | OUTPATIENT
Start: 2024-12-07 | End: 2024-12-09

## 2024-12-07 RX ORDER — BISACODYL 10 MG
10 SUPPOSITORY, RECTAL RECTAL
Status: DISCONTINUED | OUTPATIENT
Start: 2024-12-07 | End: 2024-12-09

## 2024-12-07 RX ORDER — METOCLOPRAMIDE HYDROCHLORIDE 5 MG/ML
10 INJECTION INTRAMUSCULAR; INTRAVENOUS EVERY 8 HOURS PRN
Status: DISCONTINUED | OUTPATIENT
Start: 2024-12-07 | End: 2024-12-09

## 2024-12-07 RX ORDER — MORPHINE SULFATE 4 MG/ML
4 INJECTION, SOLUTION INTRAMUSCULAR; INTRAVENOUS EVERY 30 MIN PRN
Status: DISCONTINUED | OUTPATIENT
Start: 2024-12-07 | End: 2024-12-07

## 2024-12-07 RX ORDER — MORPHINE SULFATE 2 MG/ML
2 INJECTION, SOLUTION INTRAMUSCULAR; INTRAVENOUS EVERY 2 HOUR PRN
Status: DISCONTINUED | OUTPATIENT
Start: 2024-12-07 | End: 2024-12-09

## 2024-12-07 RX ORDER — MORPHINE SULFATE 2 MG/ML
1 INJECTION, SOLUTION INTRAMUSCULAR; INTRAVENOUS EVERY 2 HOUR PRN
Status: DISCONTINUED | OUTPATIENT
Start: 2024-12-07 | End: 2024-12-09

## 2024-12-07 RX ORDER — MORPHINE SULFATE 4 MG/ML
4 INJECTION, SOLUTION INTRAMUSCULAR; INTRAVENOUS EVERY 2 HOUR PRN
Status: DISCONTINUED | OUTPATIENT
Start: 2024-12-07 | End: 2024-12-09

## 2024-12-07 RX ORDER — ONDANSETRON 2 MG/ML
4 INJECTION INTRAMUSCULAR; INTRAVENOUS EVERY 6 HOURS PRN
Status: DISCONTINUED | OUTPATIENT
Start: 2024-12-07 | End: 2024-12-09

## 2024-12-07 RX ORDER — SENNOSIDES 8.6 MG
17.2 TABLET ORAL NIGHTLY PRN
Status: DISCONTINUED | OUTPATIENT
Start: 2024-12-07 | End: 2024-12-09

## 2024-12-07 RX ORDER — SODIUM PHOSPHATE, DIBASIC AND SODIUM PHOSPHATE, MONOBASIC 7; 19 G/230ML; G/230ML
1 ENEMA RECTAL ONCE AS NEEDED
Status: DISCONTINUED | OUTPATIENT
Start: 2024-12-07 | End: 2024-12-09

## 2024-12-07 RX ORDER — KETOROLAC TROMETHAMINE 15 MG/ML
15 INJECTION, SOLUTION INTRAMUSCULAR; INTRAVENOUS EVERY 6 HOURS PRN
Status: DISPENSED | OUTPATIENT
Start: 2024-12-07 | End: 2024-12-09

## 2024-12-07 NOTE — PLAN OF CARE
NG tube placed to left nostril, secured at 55 cm. Tolerated well. Placement confirmed with auscultation and chest XR. NGT set up to low intermittent suction per order. Will continue to monitor.

## 2024-12-07 NOTE — ED PROVIDER NOTES
Patient Seen in: [facility] Emergency Department      History     Chief Complaint   Patient presents with    Abdomen/Flank Pain     Stated Complaint: abd pain    Subjective:   HPI      69-year-old female with right upper quadrant pain that began a couple hours ago has since eased but not completely resolved now she has some epigastric and left upper quadrant discomfort.  She denies cough cold runny nose or any other exacerbating relieving factor associated symptom.    Objective:     Past Medical History:    Abdominal pain, epigastric    Cellulitis and abscess of unspecified site    Mastodynia    Other enthesopathy of elbow region    Pain in thoracic spine    Rash and other nonspecific skin eruption              Past Surgical History:   Procedure Laterality Date    Breast biopsy  2001          Colonoscopy  2022    Angelia localization wire 1 site left (cpt=19281)       atypical ductal hyperplasia    Other surgical history      Other surgical history  2002    EXCISION OF LEFT BREAST MASS    Other surgical history  10/01/2010    REMOVED A GROWTH ON TONGUE    Tonsillectomy  1960                Social History     Socioeconomic History    Marital status:    Occupational History    Occupation: homemaker   Tobacco Use    Smoking status: Passive Smoke Exposure - Never Smoker     Passive exposure: Never    Smokeless tobacco: Never   Vaping Use    Vaping status: Never Used   Substance and Sexual Activity    Alcohol use: No    Drug use: No   Other Topics Concern    Caffeine Concern No    Stress Concern No    Weight Concern No    Special Diet No    Exercise No    Seat Belt Yes    Self-Exams Yes     Comment: Monthly                  Physical Exam     ED Triage Vitals [24 2344]   /74   Pulse 63   Resp 18   Temp 98.1 °F (36.7 °C)   Temp src Oral   SpO2 95 %   O2 Device None (Room air)       Current Vitals:   Vital Signs  BP: 119/74  Pulse: 63  Resp: 15  Temp:  98.1 °F (36.7 °C)  Temp src: Oral    Oxygen Therapy  SpO2: 100 %  O2 Device: None (Room air)        Physical Exam  Awake alert patient appears no distress HEENT exam is normal lungs are clear cardiovascular exam regular rhythm abdomen soft nontender extremities no clubbing cyanosis or edema no rash back exam is normal skin is nondiaphoretic    ED Course     Labs Reviewed   COMP METABOLIC PANEL (14) - Abnormal; Notable for the following components:       Result Value    Glucose 114 (*)     BUN 8 (*)     All other components within normal limits   LIPASE - Normal   TROPONIN I HIGH SENSITIVITY - Normal   CBC WITH DIFFERENTIAL WITH PLATELET   RAINBOW DRAW LAVENDER   RAINBOW DRAW LIGHT GREEN   RAINBOW DRAW GOLD   RAINBOW DRAW BLUE     EKG    Rate, intervals and axes as noted on EKG Report.  Rate: 60  Rhythm: Sinus Rhythm  Reading: No areas of acute ST segment elevation or depression                Differential diagnosis includes acute cholecystitis perforated viscus       MDM              Medical Decision Making  69-year-old female presenting to the emergency department for right upper quadrant pain IV established cardiac monitor shows a sinus rhythm pulse ox shows no signs of hypoxia CBC shows normal white cell count metabolic panel stable renal function liver enzymes lipase level normal.  Independent interpretation by ED physician CT scan shows small bowel obstruction patient will be admitted at this time    Problems Addressed:  SBO (small bowel obstruction) (HCC): acute illness or injury    Amount and/or Complexity of Data Reviewed  Labs: ordered. Decision-making details documented in ED Course.  Radiology: ordered and independent interpretation performed. Decision-making details documented in ED Course.  ECG/medicine tests: ordered and independent interpretation performed. Decision-making details documented in ED Course.        Disposition and Plan     Clinical Impression:  1. SBO (small bowel obstruction) (HCC)          Disposition:  There is no disposition on file for this visit.  There is no disposition time on file for this visit.    Follow-up:  No follow-up provider specified.        Medications Prescribed:  Current Discharge Medication List              Supplementary Documentation:

## 2024-12-07 NOTE — PLAN OF CARE
NURSING ADMISSION NOTE    Patient admitted via Cart  Oriented to room.  Safety precautions initiated.  Bed in low position.  Call light in reach.    Pt received from ER. Pt a/o x4. VSS on RA. No c/o pain or n/v at this time. IVF per order. Gen sx consulted.     Call light within reach.

## 2024-12-07 NOTE — ED QUICK NOTES
Rounding Completed    Plan of Care reviewed. Waiting for room.  Elimination needs assessed.  Provided warm blanket.    Bed is locked and in lowest position. Call light within reach.

## 2024-12-07 NOTE — PLAN OF CARE
Pt A&Ox4. VSS on RA. . SCD's on bilaterally. IV fluids infusing as ordered. Strict NPO. Intermittent nausea. Gen sx to see. Call light within reach. Will continue to monitor.

## 2024-12-07 NOTE — CONSULTS
[facility]  Report of Consultation    Allie Chakraborty Patient Status:  Inpatient    1955 MRN [id number]   Location [facility] 3SW-A Attending Evelyn Jimenes, DO   Hosp Day # 0 PCP Robin Dodson MD     Requesting Physician:  Dr. Norris Recio    Reason for Consultation:  Small bowel obstruction    I have seen and evaluated the patient in conjunction with my physician assistant.    I reviewed all data and imaging from this admission.  I concur with the radiologist interpretation except as noted.    I have modified this document to reflect my data interpretation, my physical exam findings, and my care plan.    Chief Complaint:  Abdominal pain    History of Present Illness:  Allie Chakraborty is a 69 year old female who presents to [facility] on 2024 for evaluation of abdominal pain.  The patient states that on the day prior to admission she developed a sudden onset of severe abdominal pain.  She reports that the abdominal pain was generalized throughout her abdomen.  She states that over the past several weeks she has had mild abdominal discomfort but the severe pain started on the day prior to her admission.  She reports associated nausea and vomiting.  She reports very minimal flatus.  She states that she had a very small bowel movement yesterday.  She denies a history of bowel obstructions.    Upon presentation to the hospital, the patient was afebrile and hemodynamically stable.  BUN 8 creatinine 0.70.  Alkaline phosphatase 72 AST 26 ALT 17 total bilirubin 0.7.  Lipase 42.  WBC 9.3 hemoglobin 13.1 platelets 228,000.  CT scan of the abdomen pelvis with findings of small bowel obstruction with transition point in the left upper quadrant.  The patient has been admitted to [facility].  The patient reports that since her admission she continues to report increasing abdominal pain and discomfort.  She reports continued nausea.    Past abdominal surgical history includes  section x  3.      History:  Past Medical History:    Abdominal pain, epigastric    Cellulitis and abscess of unspecified site    Mastodynia    Other enthesopathy of elbow region    Pain in thoracic spine    Rash and other nonspecific skin eruption     Past Surgical History:   Procedure Laterality Date    Breast biopsy  2001          Colonoscopy  2022    Angelia localization wire 1 site left (cpt=19281)       atypical ductal hyperplasia    Other surgical history      Other surgical history  2002    EXCISION OF LEFT BREAST MASS    Other surgical history  10/01/2010    REMOVED A GROWTH ON TONGUE    Tonsillectomy  1960     Family History   Problem Relation Age of Onset    Cancer Maternal Grandmother         BREAST    Breast Cancer Maternal Grandmother 60    Cancer Maternal Grandfather     Cancer Maternal Grandfather     Cancer Father     Thyroid Disorder Mother       reports that she is a non-smoker but has been exposed to tobacco smoke. She has never used smokeless tobacco. She reports that she does not drink alcohol and does not use drugs.    Allergies:  Allergies[1]    Medications:  Medications Prior to Admission   Medication Sig    triamcinolone 0.1 % External Cream     COLLAGEN OR Take by mouth.    RESTASIS 0.05 % Ophthalmic Emulsion Apply 1 drop to eye 2 (two) times daily. 1 drop each eye twice a day.    Cholecalciferol (VITAMIN D) 1000 units Oral Tab Take by mouth daily.    Krill Oil 1000 MG Oral Cap Take by mouth daily.    Calcium Carb-Cholecalciferol 1000-800 MG-UNIT Oral Tab Take 1 tablet by mouth daily.    Multiple Vitamins-Minerals (WOMENS 50+ ADVANCED) Oral Cap Take 2 Tabs by mouth daily.    PEG 3350-KCl-Na Bicarb-NaCl (TRILYTE) 420 g Oral Recon Soln Starting at 4:00 pm the night before procedure, drink 8 ounces of the prep every 15-20 minutes until finished (Patient not taking: Reported on 2024)    [] amoxicillin 500 MG Oral Cap Take 2 capsules (1,000 mg total)  by mouth 2 (two) times daily for 10 days.    [] cetirizine-pseudoephedrine ER 5-120 MG Oral Tablet 12 Hr Take 1 tablet by mouth 2 (two) times daily for 7 days.    [] fluticasone propionate 50 MCG/ACT Nasal Suspension 2 sprays by Nasal route daily.         Current Facility-Administered Medications:     melatonin tab 3 mg, 3 mg, Oral, Nightly PRN    ondansetron (Zofran) 4 MG/2ML injection 4 mg, 4 mg, Intravenous, Q6H PRN    metoclopramide (Reglan) 5 mg/mL injection 10 mg, 10 mg, Intravenous, Q8H PRN    sodium chloride 0.9% infusion, , Intravenous, Continuous    heparin (Porcine) 5000 UNIT/ML injection 5,000 Units, 5,000 Units, Subcutaneous, Q8H ANDERSON    acetaminophen (Tylenol Extra Strength) tab 500 mg, 500 mg, Oral, Q4H PRN    morphINE PF 2 MG/ML injection 1 mg, 1 mg, Intravenous, Q2H PRN **OR** morphINE PF 2 MG/ML injection 2 mg, 2 mg, Intravenous, Q2H PRN **OR** morphINE PF 4 MG/ML injection 4 mg, 4 mg, Intravenous, Q2H PRN    polyethylene glycol (PEG 3350) (Miralax) 17 g oral packet 17 g, 17 g, Oral, Daily PRN    sennosides (Senokot) tab 17.2 mg, 17.2 mg, Oral, Nightly PRN    bisacodyl (Dulcolax) 10 MG rectal suppository 10 mg, 10 mg, Rectal, Daily PRN    fleet enema (Fleet) rectal enema 133 mL, 1 enema, Rectal, Once PRN    ketorolac (Toradol) 15 MG/ML injection 15 mg, 15 mg, Intravenous, Q6H PRN      Physical Exam:  /64 (BP Location: Left arm)   Pulse 77   Temp 98.6 °F (37 °C) (Oral)   Resp 16   Ht 67.5\"   Wt 127 lb 3.2 oz (57.7 kg)   SpO2 92%   BMI 19.63 kg/m²   Physical Exam  Constitutional:       General: She is not in acute distress.     Appearance: Normal appearance. She is not ill-appearing.   HENT:      Head: Normocephalic and atraumatic.      Mouth/Throat:      Mouth: Mucous membranes are moist.      Pharynx: Oropharynx is clear.   Eyes:      Conjunctiva/sclera: Conjunctivae normal.   Cardiovascular:      Rate and Rhythm: Normal rate and regular rhythm.      Pulses: Normal pulses.    Pulmonary:      Effort: Pulmonary effort is normal. No respiratory distress.   Abdominal:      General: There is distension.      Palpations: Abdomen is soft.      Tenderness: There is abdominal tenderness. There is no guarding or rebound.      Comments: Abdomen is soft, mildly distended, positive mild generalized tenderness to palpation, no rebound tenderness.  No guarding.   Musculoskeletal:         General: Normal range of motion.      Cervical back: Normal range of motion.   Skin:     General: Skin is warm and dry.   Neurological:      General: No focal deficit present.      Mental Status: She is alert and oriented to person, place, and time.   Psychiatric:         Mood and Affect: Mood normal.         Behavior: Behavior normal.         Laboratory Data:  Recent Labs   Lab 12/07/24  0011   RBC 4.19   HGB 13.1   HCT 38.8   MCV 92.6   MCH 31.3   MCHC 33.8   RDW 13.1   NEPRELIM 6.34   WBC 9.3   .0       Recent Labs   Lab 12/07/24  0011   *   BUN 8*   CREATSERUM 0.70   CA 9.8   ALB 4.1      K 3.6      CO2 28.0   ALKPHO 72   AST 26   ALT 17   BILT 0.7   TP 6.8         No results for input(s): \"PTP\", \"INR\", \"PTT\" in the last 168 hours.      CT ABDOMEN+PELVIS(CONTRAST ONLY)(CPT=74177)    Result Date: 12/7/2024  CONCLUSION:  Small bowel obstruction with transition point in the left upper quadrant, series 2,  image 39.  Trace right-sided hydronephrosis.  Intrahepatic and common bile duct dilatation as well as pancreatic ductal dilatation.  Follow-up MRI/MRCP may be helpful for further evaluation.   LOCATION:  [facility]   Dictated by (CST): Tisha [facility] MD on 12/07/2024 at 8:41 AM     Finalized by (CST): Tisha [facility] MD on 12/07/2024 at 8:46 AM       XR CHEST AP PORTABLE  (CPT=71045)    Result Date: 12/7/2024  CONCLUSION:  No acute disease.    LOCATION:  [facility]      Dictated by (CST): Sergey English MD on 12/07/2024 at 7:10 AM     Finalized by (CST): Sergey English MD on 12/07/2024 at 7:11 AM           Lucia Liao PA-C  12/7/2024  12:54 PM      Medical Decision Making         Impression:  Patient Active Problem List   Diagnosis    Chronic rhinitis    TMJ (temporomandibular joint syndrome)    Low HDL (under 40)    Internal hemorrhoids    Personal history of colonic polyps    Diverticulosis of large intestine without diverticulitis    Pulmonary emphysema (HCC)    Other dysphagia    SBO (small bowel obstruction) (HCC)       Partial small bowel obstruction.    No acute surgical intervention is no evidence of strangulation on exam.    Bowel rest, NG tube decompression IV fluid hydration.    Replete electrolytes.    Ambulate as tolerated.    The patient fails to improve or if she clinically deteriorates surgical intervention may be required this admission.    If bowel function resumes then diet will be advanced in stepwise fashion.    Care plan discussed all questions answered.    Further recommendations as the clinical course evolves.      Kenrick Hardy MD FACS  Trauma Medical Director, [facility] General Surgery    The 21st Century Cures Act makes medical notes like these available to patients in the interest of transparency. Please be advised this is a medical document. Medical documents are intended to carry relevant information, facts as evident, and the clinical opinion of the practitioner. The medical note is intended as peer to peer communication and may appear blunt or direct. It is written in medical language and may contain abbreviations or verbiage that are unfamiliar.    This note was prepared using Dragon Medical voice recognition dictation software. As a result, errors may occur. When identified, these errors have been corrected. While every attempt is made to correct errors during dictation, discrepancies may still exist.                                     [1]   Allergies  Allergen Reactions    Wellbutrin [Bupropion] TANIA

## 2024-12-07 NOTE — H&P
[facility]     Allie Chakraborty Patient Status:  Emergency    1955 MRN [id number]   [facility] EMERGENCY DEPARTMENT Attending Norris Recio MD   Hosp Day # 0 PCP Robin Dodson MD     Chief Complaint: Abdominal pain    Subjective:    History of Present Illness:     Allie Chakraborty is a 69 year old female with no chronic medical issues presents emergency room with abdominal pain.  Pain is located in the right upper quadrant that started a few hours before coming to the emergency room.  Patient states that the pain was up to 8 out of 10.  Patient now states the pain is lessened but is now starting to have some pain in the left upper quadrant and epigastric region.  Patient is having some nausea but no vomiting.  No fevers, chills, diarrhea or constipation.  No hematochezia, melena, hematuria.    History/Other:    Past Medical History:  Past Medical History:    Abdominal pain, epigastric    Cellulitis and abscess of unspecified site    Mastodynia    Other enthesopathy of elbow region    Pain in thoracic spine    Rash and other nonspecific skin eruption     Past Surgical History:   Past Surgical History:   Procedure Laterality Date    Breast biopsy  2001      /    Colonoscopy  2022    Angelia localization wire 1 site left (cpt=19281)       atypical ductal hyperplasia    Other surgical history      Other surgical history  2002    EXCISION OF LEFT BREAST MASS    Other surgical history  10/01/2010    REMOVED A GROWTH ON TONGUE    Tonsillectomy  1960      Family History:   Family History   Problem Relation Age of Onset    Cancer Maternal Grandmother         BREAST    Breast Cancer Maternal Grandmother 60    Cancer Maternal Grandfather     Cancer Maternal Grandfather     Cancer Father     Thyroid Disorder Mother      Social History:    reports that she is a non-smoker but has been exposed to tobacco smoke. She has never used  smokeless tobacco. She reports that she does not drink alcohol and does not use drugs.     Allergies: Allergies[1]    Medications:  Medications Ordered Prior to Encounter[2]    Review of Systems:   A comprehensive review of systems was completed.    Pertinent positives and negatives noted in the HPI.    Objective:   Physical Exam:    /74   Pulse 63   Temp 98.1 °F (36.7 °C) (Oral)   Resp 15   Ht 5' 7.5\" (1.715 m)   Wt 125 lb (56.7 kg)   SpO2 100%   BMI 19.29 kg/m²   General: No acute distress, Alert  Respiratory: No rhonchi, no wheezes  Cardiovascular: S1, S2. Regular rate and rhythm  Abdomen: Soft, Non-tender, non-distended, positive bowel sounds  Neuro: No new focal deficits  Extremities: No edema      Results:    Labs:      Labs Last 24 Hours:    Recent Labs   Lab 12/07/24  0011   RBC 4.19   HGB 13.1   HCT 38.8   MCV 92.6   MCH 31.3   MCHC 33.8   RDW 13.1   NEPRELIM 6.34   WBC 9.3   .0       Recent Labs   Lab 12/07/24  0011   *   BUN 8*   CREATSERUM 0.70   EGFRCR 94   CA 9.8   ALB 4.1      K 3.6      CO2 28.0   ALKPHO 72   AST 26   ALT 17   BILT 0.7   TP 6.8       No results found for: \"PT\", \"INR\"    Recent Labs   Lab 12/07/24  0011   TROPHS 3       No results for input(s): \"TROP\", \"PBNP\" in the last 168 hours.    No results for input(s): \"PCT\" in the last 168 hours.    Imaging: Imaging data reviewed in Epic.    Assessment & Plan:      # SBO  - Will continue on IVF  - continue on IV pain meds  - surgery on consult    Plan of care discussed with patient at bedside.    Robin Daley, DO    Supplementary Documentation:     The 21st Century Cures Act makes medical notes like these available to patients in the interest of transparency. Please be advised this is a medical document. Medical documents are intended to carry relevant information, facts as evident, and the clinical opinion of the practitioner. The medical note is intended as peer to peer communication and  may appear blunt or direct. It is written in medical language and may contain abbreviations or verbiage that are unfamiliar.                                     [1]   Allergies  Allergen Reactions    Wellbutrin [Bupropion] JITTERY   [2]   No current facility-administered medications on file prior to encounter.     Current Outpatient Medications on File Prior to Encounter   Medication Sig Dispense Refill    triamcinolone 0.1 % External Cream       PEG 3350-KCl-Na Bicarb-NaCl (TRILYTE) 420 g Oral Recon Soln Starting at 4:00 pm the night before procedure, drink 8 ounces of the prep every 15-20 minutes until finished (Patient not taking: Reported on 2024) 1 each 0    COLLAGEN OR Take by mouth.      [] amoxicillin 500 MG Oral Cap Take 2 capsules (1,000 mg total) by mouth 2 (two) times daily for 10 days. 40 capsule 0    [] cetirizine-pseudoephedrine ER 5-120 MG Oral Tablet 12 Hr Take 1 tablet by mouth 2 (two) times daily for 7 days. 14 tablet 0    [] fluticasone propionate 50 MCG/ACT Nasal Suspension 2 sprays by Nasal route daily. 16 g 0    RESTASIS 0.05 % Ophthalmic Emulsion Apply 1 drop to eye 2 (two) times daily. 1 drop each eye twice a day.  4    Cholecalciferol (VITAMIN D) 1000 units Oral Tab Take by mouth daily.      Krill Oil 1000 MG Oral Cap Take by mouth daily.      Calcium Carb-Cholecalciferol 1000-800 MG-UNIT Oral Tab Take 1 tablet by mouth daily.      Multiple Vitamins-Minerals (WOMENS 50+ ADVANCED) Oral Cap Take 2 Tabs by mouth daily.

## 2024-12-08 ENCOUNTER — APPOINTMENT (OUTPATIENT)
Dept: MRI IMAGING | Facility: HOSPITAL | Age: 69
End: 2024-12-08
Attending: STUDENT IN AN ORGANIZED HEALTH CARE EDUCATION/TRAINING PROGRAM
Payer: MEDICARE

## 2024-12-08 PROBLEM — K83.8 DILATION OF BILIARY TRACT: Status: ACTIVE | Noted: 2024-12-08

## 2024-12-08 PROBLEM — R53.83 OTHER FATIGUE: Status: ACTIVE | Noted: 2024-12-08

## 2024-12-08 LAB
ALBUMIN SERPL-MCNC: 3.6 G/DL (ref 3.2–4.8)
ALBUMIN/GLOB SERPL: 1.8 {RATIO} (ref 1–2)
ALP LIVER SERPL-CCNC: 61 U/L
ALT SERPL-CCNC: 13 U/L
ANION GAP SERPL CALC-SCNC: 6 MMOL/L (ref 0–18)
AST SERPL-CCNC: 21 U/L (ref ?–34)
BASOPHILS # BLD AUTO: 0.03 X10(3) UL (ref 0–0.2)
BASOPHILS NFR BLD AUTO: 0.6 %
BILIRUB SERPL-MCNC: 0.6 MG/DL (ref 0.2–1.1)
BUN BLD-MCNC: 8 MG/DL (ref 9–23)
CALCIUM BLD-MCNC: 8.2 MG/DL (ref 8.7–10.4)
CANCER AG19-9 SERPL-ACNC: 13.6 U/ML (ref ?–35)
CHLORIDE SERPL-SCNC: 111 MMOL/L (ref 98–112)
CO2 SERPL-SCNC: 26 MMOL/L (ref 21–32)
CREAT BLD-MCNC: 0.62 MG/DL
EGFRCR SERPLBLD CKD-EPI 2021: 97 ML/MIN/1.73M2 (ref 60–?)
EOSINOPHIL # BLD AUTO: 0.09 X10(3) UL (ref 0–0.7)
EOSINOPHIL NFR BLD AUTO: 1.7 %
ERYTHROCYTE [DISTWIDTH] IN BLOOD BY AUTOMATED COUNT: 13.2 %
GLOBULIN PLAS-MCNC: 2 G/DL (ref 2–3.5)
GLUCOSE BLD-MCNC: 87 MG/DL (ref 70–99)
HCT VFR BLD AUTO: 34.6 %
HGB BLD-MCNC: 11.3 G/DL
IMM GRANULOCYTES # BLD AUTO: 0.02 X10(3) UL (ref 0–1)
IMM GRANULOCYTES NFR BLD: 0.4 %
LYMPHOCYTES # BLD AUTO: 1.75 X10(3) UL (ref 1–4)
LYMPHOCYTES NFR BLD AUTO: 33.2 %
MCH RBC QN AUTO: 31 PG (ref 26–34)
MCHC RBC AUTO-ENTMCNC: 32.7 G/DL (ref 31–37)
MCV RBC AUTO: 94.8 FL
MONOCYTES # BLD AUTO: 0.54 X10(3) UL (ref 0.1–1)
MONOCYTES NFR BLD AUTO: 10.2 %
NEUTROPHILS # BLD AUTO: 2.84 X10 (3) UL (ref 1.5–7.7)
NEUTROPHILS # BLD AUTO: 2.84 X10(3) UL (ref 1.5–7.7)
NEUTROPHILS NFR BLD AUTO: 53.9 %
OSMOLALITY SERPL CALC.SUM OF ELEC: 294 MOSM/KG (ref 275–295)
PLATELET # BLD AUTO: 174 10(3)UL (ref 150–450)
POTASSIUM SERPL-SCNC: 3.7 MMOL/L (ref 3.5–5.1)
PROT SERPL-MCNC: 5.6 G/DL (ref 5.7–8.2)
RBC # BLD AUTO: 3.65 X10(6)UL
SODIUM SERPL-SCNC: 143 MMOL/L (ref 136–145)
TSI SER-ACNC: 1.58 UIU/ML (ref 0.55–4.78)
WBC # BLD AUTO: 5.3 X10(3) UL (ref 4–11)

## 2024-12-08 PROCEDURE — 99232 SBSQ HOSP IP/OBS MODERATE 35: CPT | Performed by: SURGERY

## 2024-12-08 PROCEDURE — 76376 3D RENDER W/INTRP POSTPROCES: CPT | Performed by: STUDENT IN AN ORGANIZED HEALTH CARE EDUCATION/TRAINING PROGRAM

## 2024-12-08 PROCEDURE — 74183 MRI ABD W/O CNTR FLWD CNTR: CPT | Performed by: STUDENT IN AN ORGANIZED HEALTH CARE EDUCATION/TRAINING PROGRAM

## 2024-12-08 PROCEDURE — 99232 SBSQ HOSP IP/OBS MODERATE 35: CPT | Performed by: STUDENT IN AN ORGANIZED HEALTH CARE EDUCATION/TRAINING PROGRAM

## 2024-12-08 RX ORDER — GADOTERATE MEGLUMINE 376.9 MG/ML
15 INJECTION INTRAVENOUS
Status: COMPLETED | OUTPATIENT
Start: 2024-12-08 | End: 2024-12-08

## 2024-12-08 NOTE — PROGRESS NOTES
[facility]  Progress Note    Allie Chakraborty Patient Status:  Inpatient    1955 MRN [id number]   Location [facility] 3SW-A Attending Kin, Karel Conway, *   Hosp Day # 1 PCP Robin Dodson MD     Subjective:  Patient states she overall is feeling much better today.  She is passing flatus and states bloating and abdominal cramping has resolved.  Currently denying any abdominal pain, nausea, vomiting.  No fevers or chills.    Objective/Physical Exam:  General: Alert, orientated x3.  Cooperative.  No apparent distress.  Vital Signs:  Blood pressure 95/60, pulse 58, temperature 97.5 °F (36.4 °C), temperature source Oral, resp. rate 14, height 67.5\", weight 127 lb 3.2 oz (57.7 kg), SpO2 91%, not currently breastfeeding.  Lungs: No respiratory distress.  Cardiac: Regular rate and rhythm.   Abdomen:  Soft, non distended, non tender, with no rebound or guarding.  No peritoneal signs.   Extremities:  No lower extremity edema noted.        Labs:  Lab Results   Component Value Date    WBC 5.3 2024    HGB 11.3 2024    HCT 34.6 2024    .0 2024     Lab Results   Component Value Date     2024    K 3.7 2024     2024    CO2 26.0 2024    BUN 8 2024    CREATSERUM 0.62 2024    GLU 87 2024    CA 8.2 2024    ALKPHO 61 2024    ALT 13 2024    AST 21 2024    BILT 0.6 2024    ALB 3.6 2024    TP 5.6 2024     No results found for: \"PT\", \"INR\"    I/O last 3 completed shifts:  In: 2398 [I.V.:2398]  Out: 775 [Urine:125; Emesis/NG output:650]  I/O this shift:  In: 0   Out: 750 [Urine:600; Emesis/NG output:150]    Assessment  Patient Active Problem List   Diagnosis    Chronic rhinitis    TMJ (temporomandibular joint syndrome)    Low HDL (under 40)    Internal hemorrhoids    Personal history of colonic polyps    Diverticulosis of large intestine without diverticulitis    Pulmonary emphysema (HCC)     Other dysphagia    SBO (small bowel obstruction) (HCC)    Dilation of biliary tract    Other fatigue       SBO    Plan:  Patient has had return of bowel function and improvement of symptoms.  Clamp NG x 4 hours, if no nausea or vomiting, and residuals less than 150 cc, may discontinue NG and start clear liquid diet  Analgesics and antiemetics as needed  Patient awaiting MRCP to further evaluate a dilated biliary and pancreatic duct seen on CT.  LFTs within normal limits.  Will follow results.  Ambulate and up to chair  DVT prophylaxis with heparin  Anticipate discharge in the next 24 hours from our standpoint if patient tolerates advancement of diet      Celine Nguyễn PA-C  12/8/2024  2:22 PM     Addendum:    I have seen and examined the patient; I obtained and performed the above history, physical examination, assessment and plan.  I have I have edited the above to reflect my evaluation, opinion, and physical exam findings.    Exam and plan as above.    Care plan discussed.  All questions answered GI recommendations appreciate    I, Dr. Kenrick Hardy, personally performed the services described in this documentation  by Ms Gopi PA-C, and they are both accurate and complete.    Kenrick Hardy MD FACS  [facility]

## 2024-12-08 NOTE — PLAN OF CARE
NG tube removed per order, tolerated well. Advanced to a clear liquid diet. Will continue to monitor.

## 2024-12-08 NOTE — PLAN OF CARE
Pt A&Ox4. VSS on RA. . IV fluids infusing as ordered. Strict NPO. Nasogastric tube to left nostril intact to LIS. Intermittent nausea. Pt c/o pain to left side of neck and ear, warm compress placed on affected area. Plan for MRI/MRCP to be completed. Call light within reach. Will continue to monitor.

## 2024-12-08 NOTE — CONSULTS
[facility]                       Gastroenterology Consultation-[facility] Gastroenterology    Allie Chakraborty Patient Status:  Inpatient    1955 MRN [id number]   Location [facility] 3SW-A Attending Karel Sanderson, *   Hosp Day # 1 PCP Robin Dodson MD         Reason for consultation: Pancreatic and biliary ductal dilation    HPI: 69-year-old female presenting for evaluation of pancreatic and biliary ductal dilation.  Patient admitted to  with abdominal pain that began yesterday.  CT abdomen pelvis with small bowel obstruction with transition point in the left upper quadrant along with intrahepatic and common bile duct dilation.  General surgery consulted.  MRCP/MR abdomen ordered with common hepatic duct 1 cm which tapers to 7 mm.  Pancreatic duct dilation noted to 6 mm.  LFTs within normal limits.  Patient passing gas today and with bowel movement.  NG clamped.    PMHx:   Past Medical History:    Abdominal pain, epigastric    Cellulitis and abscess of unspecified site    Mastodynia    Other enthesopathy of elbow region    Pain in thoracic spine    Rash and other nonspecific skin eruption       PSHx:   Past Surgical History:   Procedure Laterality Date    Breast biopsy  2001      //    Colonoscopy  2022    Angelia localization wire 1 site left (cpt=19281)       atypical ductal hyperplasia    Other surgical history      Other surgical history  2002    EXCISION OF LEFT BREAST MASS    Other surgical history  10/01/2010    REMOVED A GROWTH ON TONGUE    Tonsillectomy  1960       Medications:    [COMPLETED] gadoterate meglumine (Dotarem) 7.5 MMOL/15ML injection 15 mL  15 mL Intravenous ONCE PRN    [COMPLETED] iopamidol 76% (ISOVUE-370) injection for power injector  80 mL Intravenous ONCE PRN    melatonin tab 3 mg  3 mg Oral Nightly PRN    ondansetron (Zofran) 4 MG/2ML injection 4 mg  4 mg Intravenous Q6H PRN    metoclopramide (Reglan) 5  mg/mL injection 10 mg  10 mg Intravenous Q8H PRN    sodium chloride 0.9% infusion   Intravenous Continuous    heparin (Porcine) 5000 UNIT/ML injection 5,000 Units  5,000 Units Subcutaneous Q8H ANDERSON    acetaminophen (Tylenol Extra Strength) tab 500 mg  500 mg Oral Q4H PRN    morphINE PF 2 MG/ML injection 1 mg  1 mg Intravenous Q2H PRN    Or    morphINE PF 2 MG/ML injection 2 mg  2 mg Intravenous Q2H PRN    Or    morphINE PF 4 MG/ML injection 4 mg  4 mg Intravenous Q2H PRN    polyethylene glycol (PEG 3350) (Miralax) 17 g oral packet 17 g  17 g Oral Daily PRN    sennosides (Senokot) tab 17.2 mg  17.2 mg Oral Nightly PRN    bisacodyl (Dulcolax) 10 MG rectal suppository 10 mg  10 mg Rectal Daily PRN    fleet enema (Fleet) rectal enema 133 mL  1 enema Rectal Once PRN    [COMPLETED] ondansetron (Zofran) 4 MG/2ML injection 4 mg  4 mg Intravenous Once    ketorolac (Toradol) 15 MG/ML injection 15 mg  15 mg Intravenous Q6H PRN    phenol (Chloraseptic) 1.4 % oral liquid spray   Oral Q2H PRN       Allergies: Allergies[1]    SocHx:    Social History     Socioeconomic History    Marital status:    Occupational History    Occupation: homemaker   Tobacco Use    Smoking status: Passive Smoke Exposure - Never Smoker     Passive exposure: Never    Smokeless tobacco: Never   Vaping Use    Vaping status: Never Used   Substance and Sexual Activity    Alcohol use: No    Drug use: No   Other Topics Concern    Caffeine Concern No    Stress Concern No    Weight Concern No    Special Diet No    Exercise No    Seat Belt Yes    Self-Exams Yes     Comment: Monthly     Social Drivers of Health     Food Insecurity: Unknown (12/7/2024)    Food Insecurity     Food Insecurity: Patient declined   Transportation Needs: Unknown (12/7/2024)    Transportation Needs     Lack of Transportation: Patient declined   Housing Stability: Unknown (12/7/2024)    Housing Stability     Housing Instability: Patient declined         FamHx:    Family History    Problem Relation Age of Onset    Cancer Maternal Grandmother         BREAST    Breast Cancer Maternal Grandmother 60    Cancer Maternal Grandfather     Cancer Maternal Grandfather     Cancer Father     Thyroid Disorder Mother          ROS:  In addition to the pertinent positives described above:            Infectious Disease:  No chronic infections or recent fevers prior to the acute illness            Cardiovascular: No history of CAD, prior MI, chest pain, or palpitations            Respiratory: No shortness of breath, asthma, copd, recurrent pneumonia            Hematologic: The patient reports no easy bruising, frequent gum bleeding or nose bleeding;  The patient has no history of known chronic anemia            Dermatologic: The patient reports no recent rashes or chronic skin disorders            Rheumatologic: The patient reports no history of chronic arthritis, myalgias, arthralgias            Genitourinary:  The patient reports no history of recurrent urinary tract infections, hematuria, dysuria, or nephrolithiasis           Psychiatric: The patient reports no history of depression, anxiety, suicidal ideation, or homicidal ideation           Oncologic: The patient reports on history of prior solid tumor or hematologic malignancy           ENT: The patient reports no hoarseness of voice, hearing loss, sinus congestion, tinnitus           Neurologic: The patient reports no history of seizure, stroke, or frequent headaches      PE: BP 95/60 (BP Location: Left arm)   Pulse 58   Temp 97.5 °F (36.4 °C) (Oral)   Resp 14   Ht 5' 7.5\" (1.715 m)   Wt 127 lb 3.2 oz (57.7 kg)   SpO2 91%   BMI 19.63 kg/m²     Gen: AAO x 3, NAD   CV: Regular rate and rhythm  Resp: Clear to auscultation bilaterally  Abdomen: Soft, non-tender, non-distended with the presence of bowel sounds; No hepatosplenomegaly; no rebound or guarding; No ascites is clinically apparent; no tympany to percussion  Ext: No peripheral edema or  cyanosis  Skin: Warm and dry  Psychiatric: Appropriate mood and congruent affect without obvious depression or anxiety    Labs:   Lab Results   Component Value Date    WBC 5.3 12/08/2024    HGB 11.3 12/08/2024    HCT 34.6 12/08/2024    .0 12/08/2024    CREATSERUM 0.62 12/08/2024    BUN 8 12/08/2024     12/08/2024    K 3.7 12/08/2024     12/08/2024    CO2 26.0 12/08/2024    GLU 87 12/08/2024    CA 8.2 12/08/2024    ALB 3.6 12/08/2024    ALKPHO 61 12/08/2024    BILT 0.6 12/08/2024    AST 21 12/08/2024    ALT 13 12/08/2024     Recent Labs   Lab 12/07/24  0011 12/08/24  0620   * 87   BUN 8* 8*   CREATSERUM 0.70 0.62   CA 9.8 8.2*    143   K 3.6 3.7    111   CO2 28.0 26.0     Recent Labs   Lab 12/08/24  0620   RBC 3.65*   HGB 11.3*   HCT 34.6*   MCV 94.8   MCH 31.0   MCHC 32.7   RDW 13.2   NEPRELIM 2.84   WBC 5.3   .0       Recent Labs   Lab 12/07/24  0011 12/08/24  0620   ALT 17 13   AST 26 21         Assessment and Plan:   -Patient with double duct sign seen on recent MRI with no definite mass seen  -Case discussed with advanced endoscopy, given SBO, will look to proceed with EUS and possible ERCP once SBO fully resolved as OP   -CA 19-9  -OV in 2-4 weeks    GI will sign off at this time. Please call back with any questions or concerns.     Sonny Talbot MD  3:06 PM  12/8/2024  [facility]  [phone]             [1]   Allergies  Allergen Reactions    Wellbutrin [Bupropion] TANIA

## 2024-12-08 NOTE — PROGRESS NOTES
[facility]   part of [facility]     Hospitalist Progress Note     Allie Chakraborty Patient Status:  Inpatient    1955 MRN [id number]   Location [facility] 3SW-A Attending Kin, Karel Conway, *   Hosp Day # 1 PCP Robin Dodson MD     Chief Complaint: Abdominal pain    Subjective:      - Afebrile, VSS; notes that this AM, having small amount of flatus, no BM yet   - Pain controlled, denies other f/c, cp, sob   - NGT with 650cc output recorded   - Pt does note hx of C-sections in the past; no other hx SBO previously     Objective:    Review of Systems:   A comprehensive review of systems was completed; pertinent positive and negatives stated in subjective.    Vital signs:  Temp:  [97.9 °F (36.6 °C)-98.6 °F (37 °C)] 97.9 °F (36.6 °C)  Pulse:  [57-77] 57  Resp:  [14-20] 20  BP: ()/(58-64) 111/63  SpO2:  [92 %-94 %] 93 %    Physical Exam:    General: No acute distress  Respiratory: no wheezes, no rhonchi  Cardiovascular: S1, S2, regular rate and rhythm  Abdomen: Soft, Non-tender, non-distended, no bowel sounds  Neuro: No new focal deficits.   Extremities: no edema    Diagnostic Data:    Labs:  Recent Labs   Lab 24  0011   WBC 9.3   HGB 13.1   MCV 92.6   .0       Recent Labs   Lab 24  0011   *   BUN 8*   CREATSERUM 0.70   CA 9.8   ALB 4.1      K 3.6      CO2 28.0   ALKPHO 72   AST 26   ALT 17   BILT 0.7   TP 6.8       Estimated Creatinine Clearance: 70.1 mL/min (based on SCr of 0.7 mg/dL).    Recent Labs   Lab 24  0011   TROPHS 3       No results for input(s): \"PTP\", \"INR\" in the last 168 hours.               Microbiology    No results found for this visit on 24.      Imaging: Reviewed in Epic.    Medications:    heparin  5,000 Units Subcutaneous Q8H ANDERSON       Assessment & Plan:      # Small Bowel Obstruction    - CT A/P with findings of SBO with transition point in LUQ, intrahepatic ductal dilation as well noted    - NPO, IVF, Antiemetics     - NG tube to LIS   - ADAT per surgery    - General surgery on consult    # Biliary ductal dilation   - Noted on CT A/P on admission   - Trend LFTs; labs on admission WNL, repeat this AM WNL   - MRCP ordered     Karel Sanderson MD    Supplementary Documentation:     Quality:  DVT Mechanical Prophylaxis:   SCDs,    DVT Pharmacologic Prophylaxis   Medication    heparin (Porcine) 5000 UNIT/ML injection 5,000 Units                Code Status: Not on file  Silverman: No urinary catheter in place  Silverman Duration (in days):   Central line:    LEELA:     The 21st Century Cures Act makes medical notes like these available to patients in the interest of transparency. Please be advised this is a medical document. Medical documents are intended to carry relevant information, facts as evident, and the clinical opinion of the practitioner. The medical note is intended as peer to peer communication and may appear blunt or direct. It is written in medical language and may contain abbreviations or verbiage that are unfamiliar.              **Certification      PHYSICIAN Certification of Need for Inpatient Hospitalization - Initial Certification    Patient will require inpatient services that will reasonably be expected to span two midnight's based on the clinical documentation in H+P.   Based on patients current state of illness, I anticipate that, after discharge, patient will require TBD.

## 2024-12-08 NOTE — PLAN OF CARE
Patient A&Ox4 . Vital signs stable , on room air . Pain is controlled with morphine . Chloraseptic spray for throat pain . NPO with NG to LIS with moderate output . Denies flatus , no nausea . IV infusing ,voiding in bathroom . Ultrasound abdomen in am . Safety precautions in place . Reminded to \"call don't  fall\" . Will continue to monitor .

## 2024-12-09 VITALS
DIASTOLIC BLOOD PRESSURE: 59 MMHG | HEART RATE: 55 BPM | TEMPERATURE: 98 F | OXYGEN SATURATION: 91 % | RESPIRATION RATE: 18 BRPM | BODY MASS INDEX: 19.73 KG/M2 | WEIGHT: 127.19 LBS | SYSTOLIC BLOOD PRESSURE: 110 MMHG | HEIGHT: 67.5 IN

## 2024-12-09 PROCEDURE — 99239 HOSP IP/OBS DSCHRG MGMT >30: CPT | Performed by: STUDENT IN AN ORGANIZED HEALTH CARE EDUCATION/TRAINING PROGRAM

## 2024-12-09 NOTE — PROGRESS NOTES
[facility]   part of [facility]     Hospitalist Progress Note     Allie Chakraborty Patient Status:  Inpatient    1955 MRN [id number]   Location [facility] 3SW-A Attending Kin, Karel Conway, *   Hosp Day # 2 PCP Robin Dodson MD     Chief Complaint: Abdominal pain    Subjective:      - Tolerating clear liquid diet, NGT removed, still having ongoing flatus, no BM yet   - Vital signs stable, denies other f/c, cp, sob, abd pain, n/v    Objective:    Review of Systems:   A comprehensive review of systems was completed; pertinent positive and negatives stated in subjective.    Vital signs:  Temp:  [97.5 °F (36.4 °C)-98.3 °F (36.8 °C)] 98.1 °F (36.7 °C)  Pulse:  [55-76] 55  Resp:  [14-20] 18  BP: ()/(60-67) 103/67  SpO2:  [91 %-98 %] 98 %    Physical Exam:    General: No acute distress  Respiratory: no wheezes, no rhonchi  Cardiovascular: S1, S2, regular rate and rhythm  Abdomen: Soft, Non-tender, non-distended, positive bowel sounds  Neuro: No new focal deficits.   Extremities: no edema    Diagnostic Data:    Labs:  Recent Labs   Lab 24  0011 24  0620   WBC 9.3 5.3   HGB 13.1 11.3*   MCV 92.6 94.8   .0 174.0       Recent Labs   Lab 24  0011 24  0620   * 87   BUN 8* 8*   CREATSERUM 0.70 0.62   CA 9.8 8.2*   ALB 4.1 3.6    143   K 3.6 3.7    111   CO2 28.0 26.0   ALKPHO 72 61   AST 26 21   ALT 17 13   BILT 0.7 0.6   TP 6.8 5.6*       Estimated Creatinine Clearance: 79.1 mL/min (based on SCr of 0.62 mg/dL).    Recent Labs   Lab 24  0011   TROPHS 3       No results for input(s): \"PTP\", \"INR\" in the last 168 hours.               Microbiology    No results found for this visit on 24.      Imaging: Reviewed in Epic.    Medications:    heparin  5,000 Units Subcutaneous Q8H ANDERSON       Assessment & Plan:      # Small Bowel Obstruction    - CT A/P with findings of SBO with transition point in LUQ, intrahepatic ductal dilation as well noted     - FLD ADAT per surgery, IVF, Antiemetics    - NG tube removed   - General surgery on consult    # Biliary ductal dilation   - Noted on CT A/P on admission   - Trend LFTs; labs on admission WNL, repeat as well WNL   - MRCP with ongoing biliary ductal dilation   - GI consulted, recommending outpatient EUS in 2-4 weeks once SBO completely resolved    Dispo pending return of bowel function, hopefully in next 24-48hrs     Karel Sanderson MD    Supplementary Documentation:     Quality:  DVT Mechanical Prophylaxis:   SCDs,    DVT Pharmacologic Prophylaxis   Medication    heparin (Porcine) 5000 UNIT/ML injection 5,000 Units                Code Status: Not on file  Silverman: No urinary catheter in place  Silverman Duration (in days):   Central line:    LEELA:     The 21st Century Cures Act makes medical notes like these available to patients in the interest of transparency. Please be advised this is a medical document. Medical documents are intended to carry relevant information, facts as evident, and the clinical opinion of the practitioner. The medical note is intended as peer to peer communication and may appear blunt or direct. It is written in medical language and may contain abbreviations or verbiage that are unfamiliar.              **Certification      PHYSICIAN Certification of Need for Inpatient Hospitalization - Initial Certification    Patient will require inpatient services that will reasonably be expected to span two midnight's based on the clinical documentation in H+P.   Based on patients current state of illness, I anticipate that, after discharge, patient will require TBD.

## 2024-12-09 NOTE — PROGRESS NOTES
[facility]  Progress Note    Allie Chakraborty Patient Status:  Inpatient    1955 MRN [id number]   Location [facility] 3SW-A Attending Karel Sanderson, *   Hosp Day # 2 PCP Robin Dodson MD     Subjective:  She is seen and examined resting in bed, she reports feeling well today. She reports tolerating full liquid diet. She reports passing flatus but denies a bowel movement.     Objective/Physical Exam:  /59 (BP Location: Left arm)   Pulse 55   Temp 98.2 °F (36.8 °C) (Oral)   Resp 18   Ht 67.5\"   Wt 127 lb 3.2 oz (57.7 kg)   SpO2 91%   BMI 19.63 kg/m²     Intake/Output Summary (Last 24 hours) at 2024 1227  Last data filed at 2024 2159  Gross per 24 hour   Intake 600 ml   Output 650 ml   Net -50 ml         General: Awake, Alert,  Cooperative.  No apparent distress.  Pulm: no respiratory distress, no increased work of breathing  Abdomen:  Soft, non-distended,non-tender to palpation , with no rebound or guarding.  No peritoneal signs.  Skin: warm, dry. No jaundice.     Labs:      No results found for: \"PT\", \"INR\"            Assessment:  Patient Active Problem List   Diagnosis    Chronic rhinitis    TMJ (temporomandibular joint syndrome)    Low HDL (under 40)    Internal hemorrhoids    Personal history of colonic polyps    Diverticulosis of large intestine without diverticulitis    Pulmonary emphysema (HCC)    Other dysphagia    SBO (small bowel obstruction) (HCC)    Dilation of biliary tract    Other fatigue       Small bowel obstruction    Plan:  Continue full liquid diet as tolerated; await bowel movement prior to advancement to soft diet  Noted plans for outpatient endoscopy as outpatient once SBO has fully resolved.   Analgesics and antiemetics as needed  Encourage ambulation and up to chair  Medical management per primary   DVT prophylaxis with heparin     Kimberley Platt PA-C  2024  12:27 PM

## 2024-12-09 NOTE — PLAN OF CARE
Discharge instructions reviewed with patient. All questions answered. IV removed. Bedside belongings packed up and returned to patient. Will be discharged to home.

## 2024-12-09 NOTE — PLAN OF CARE
Pt A&Ox4, VSS on RA. Heparin. Pain managed with IV pain meds. Up independently, voiding in the bathroom. CLD, NPO @  [place]. Poss. EUS, follow up with GI outpatient. DC when medically cleared. Call light in reach, safety measures in place.

## 2024-12-09 NOTE — PLAN OF CARE
Pt A&Ox4. VSS on RA. . IV fluids infusing as ordered. Advanced to full liquid diet. Pain managed with prn Toradol. Will be discharge to home when tolerating a soft diet. Call light within reach. Will continue to monitor.

## 2024-12-10 ENCOUNTER — PATIENT OUTREACH (OUTPATIENT)
Dept: CASE MANAGEMENT | Age: 69
End: 2024-12-10

## 2024-12-10 ENCOUNTER — TELEPHONE (OUTPATIENT)
Dept: FAMILY MEDICINE CLINIC | Facility: CLINIC | Age: 69
End: 2024-12-10

## 2024-12-10 DIAGNOSIS — K56.609 SBO (SMALL BOWEL OBSTRUCTION) (HCC): Primary | ICD-10-CM

## 2024-12-10 DIAGNOSIS — Z02.9 ENCOUNTERS FOR ADMINISTRATIVE PURPOSES: ICD-10-CM

## 2024-12-10 PROCEDURE — 1111F DSCHRG MED/CURRENT MED MERGE: CPT

## 2024-12-10 PROCEDURE — 1159F MED LIST DOCD IN RCRD: CPT

## 2024-12-10 NOTE — TELEPHONE ENCOUNTER
MARTÍN, Spoke to patient for TCM today.  Patient states that she is doing well and will be following up with GI and declined an appt with PCP at this time.  TCM appointment recommended by 12/16/24 as patient is a High risk for readmission.      BOOK BY DATE (last date for TCM): 12/23/24

## 2024-12-10 NOTE — PROGRESS NOTES
Transitional Care Management   Discharge Date: 24  Contact Date: 12/10/2024    Assessment:  TCM Initial Assessment    General:  Assessment completed with: Patient  Patient Subjective: They ran a myriad of tests and it all has to do with gastrointestinal so I will be following up with them.   I'm feeling pretty good. It was a small bowel obstruction. Everything looked good and maybe there was some irritation but they couldn't get a good look so they said to follow up after discharge, which I'll be doing.  Chief Complaint: SBO  Verify patient name and  with patient/ caregiver: Yes    Hospital Stay/Discharge:  Tell me what you understand of why you were in the hospital or emergency department: abdominal pain  Prior to leaving the hospital were your Discharge Instructions reviewed with you?: Yes  Did you receive a copy of your written Discharge Instructions?: Yes  What questions do you have about your Discharge Instructions?: none  Do you feel better or worse since you left the hospital or emergency department?: Better    Follow - Up Appointment:  Do you have a follow-up appointment?: Yes  Date: 24  Physician: Dr. Watson  Are there any barriers to getting to your follow-up appointment?: No    Home Health/DME:  Prior to leaving the hospital was Home Health (HH) arranged for you?: No     Prior to leaving the hospital or emergency department was Durable Medical Equipment (DME), medical supplies, or infusions arranged for you?: No  Are DME/medical supply/infusions needs identified by staff during this assessment?: No     Medications/Diet:  Did any of your medications change, during or after your hospital stay or ED visit?: No  Do you understand what your medications are for and possible side effects?: Yes  Are there any reasons that keep you from taking your medication as prescribed?: No  Any concerns about medication refills?: No    Were you given a different diet per your Discharge Instructions?: No      Questions/Concerns:  Do you have any questions or concerns that have not been discussed?: No            Nursing Interventions: [facility] reviewed discharge instructions and when to seek medical attention with the patient. She states that she is feeling pretty good and has no further pain. She states that they ran many tests and found maybe some irritation but couldn't get a great look and said to follow up after discharge (GI). She stated that she already has her appt with Dr. Watson. She states that she has her last hemorrhoid banding with Dr. Hoyt scheduled but is not sure if she should do it. She states that she called that office and has not received a call back yet and she has been waiting. [facility] advised sending a [employer] message and walked patient through on how to do so. She thanked [facility] for the help and walking her through on how to send messages. She states that she does not need a follow up with her GP as she will be following up with GI. She denied having any new or worsening symptoms. Med review completed. She denied having any other questions or concerns at this time.     Medication Review:   Current Outpatient Medications   Medication Sig Dispense Refill    COLLAGEN OR Take by mouth.      RESTASIS 0.05 % Ophthalmic Emulsion Apply 1 drop to eye 2 (two) times daily. 1 drop each eye twice a day.  4    Cholecalciferol (VITAMIN D) 1000 units Oral Tab Take by mouth daily.      Krill Oil 1000 MG Oral Cap Take by mouth daily.      Calcium Carb-Cholecalciferol 1000-800 MG-UNIT Oral Tab Take 1 tablet by mouth daily.      Multiple Vitamins-Minerals (WOMENS 50+ ADVANCED) Oral Cap Take 2 Tabs by mouth daily.       Did patient review medications using current pill bottles and not just a medication list?  No  Discharge medications reviewed/discussed/and reconciled against outpatient medications with patient.  Any changes or updates to medications sent to primary care provider.  Patient Acknowledged    SDOH:    Transportation Needs: Unknown (12/7/2024)    Transportation Needs     Lack of Transportation: Patient declined     Car Seat: Not on file     Financial Resource Strain: Low Risk  (12/10/2024)    Financial Resource Strain     Difficulty of Paying Living Expenses: Not hard at all     Med Affordability: No       Follow-up Appointments:  Your appointments       Date & Time Appointment Department ([place])    Dec 18, 2024 9:30 AM CST Procedure with Alberta Hoyt MD [facility] ([place], [place]), [facility] ([facility])        Dec 24, 2024 10:00 AM CST Specialty Procedure Consult with Kem Watson DO [facility] ([facility])    Please arrive 15 minutes prior to your scheduled procedure time.         Jan 08, 2025 7:15 AM CST ULTRASOUND OF THE ABDOMEN EXAM with IRIS [address] ([facility] [address])    Please arrive 15 minutes prior to the scheduled appointment time.    Fasting instructions for adults 18 years of age and older:    Please do not eat or drink anything for 8 hours prior to your exam. If you need to take oral medication in the morning, please take it with plain water only. Not following instructions may compromise your exam and you may need to reschedule.      Jan 08, 2025 8:00 AM CST [facility] with [facility]  [facility] [address] [place] [address] ([address])              [facility] Ultras[facility] [address]  [address]  [address] [place] [address]  [phone] [facility], [address] [place] [address] [address]  [address] [place] [address]  [phone] [facility] ([place], [place]), [facility]  [address] [place] [address]  [phone]    [facility]  [facility]  [address] [place] [address]  [phone]            Transitional Care Clinic  Was TCC Ordered:  No    Primary Care Provider (If no TCC appointment)  Does patient already have a PCP appointment scheduled? No  Nurse Care Manager Attempted to schedule PCP office TCM appointment with patient   -If no appointment scheduled: Explain -Pt declined at this time, NCM sent TE to PCP office.     Specialist  Does the patient have any other follow-up appointment(s) that need to be scheduled? Yes   -If yes: Nurse Care Manager reviewed upcoming specialist appointments with patient: Yes   -Does the patient need assistance scheduling appointment(s): No, pt already has appts with Dr. Hoyt and Dr. Watson    CCM referral placed:  No    Book By Date: 12/23/24

## 2024-12-10 NOTE — PAYOR COMM NOTE
--------------  ADMISSION REVIEW     Payor: [employer] MEDICARE ADV PPO  Subscriber #:  [id number]  Authorization Number: [id number]    Admit date: 12/7/24  Admit time:  5:03 AM       History   HPI  69-year-old female with right upper quadrant pain that began a couple hours ago has since eased but not completely resolved now she has some epigastric and left upper quadrant discomfort.  She denies cough cold runny nose or any other exacerbating relieving factor associated symptom.    ED Triage Vitals [12/06/24 2344]   /74   Pulse 63   Resp 18   Temp 98.1 °F (36.7 °C)   Temp src Oral   SpO2 95 %   O2 Device None (Room air)     Physical Exam  Awake alert patient appears no distress HEENT exam is normal lungs are clear cardiovascular exam regular rhythm abdomen soft nontender extremities no clubbing cyanosis or edema no rash back exam is normal skin is nondiaphoretic    Labs Reviewed   COMP METABOLIC PANEL (14) - Abnormal; Notable for the following components:       Result Value    Glucose 114 (*)     BUN 8 (*)      EKG    Rate, intervals and axes as noted on EKG Report.  Rate: 60  Rhythm: Sinus Rhythm  Reading: No areas of acute ST segment elevation or depression    Disposition and Plan     Clinical Impression:  1. SBO (small bowel obstruction) ([facility])               [facility]  History and Physical   History of Present Illness:    Allie Chakraborty is a 69 year old female with no chronic medical issues presents emergency room with abdominal pain.  Pain is located in the right upper quadrant that started a few hours before coming to the emergency room.  Patient states that the pain was up to 8 out of 10.  Patient now states the pain is lessened but is now starting to have some pain in the left upper quadrant and epigastric region.  Patient is having some nausea but no vomiting.  No fevers, chills, diarrhea or constipation.  No hematochezia, melena, hematuria.     /74   Pulse 63   Temp 98.1 °F (36.7 °C)  (Oral)   Resp 15   Ht 5' 7.5\" (1.715 m)   Wt 125 lb (56.7 kg)   SpO2 100%   BMI 19.29 kg/m²   General: Alert  Respiratory: No rhonchi, no wheezes  Cardiovascular: S1, S2. Regular rate and rhythm  Abdomen: Soft, Non-tender, non-distended, positive bowel sounds  Neuro: No new focal deficits  Extremities: No edema     Lab 12/07/24  0011   RBC 4.19   HGB 13.1   HCT 38.8   MCV 92.6   MCH 31.3   MCHC 33.8   RDW 13.1   NEPRELIM 6.34   WBC 9.3   .0      Lab 12/07/24  0011   *   BUN 8*   CREATSERUM 0.70   EGFRCR 94   CA 9.8   ALB 4.1      K 3.6      CO2 28.0   ALKPHO 72   AST 26   ALT 17   BILT 0.7   TP 6.8      Lab 12/07/24  0011   TROPHS 3      Assessment & Plan:       # SBO  - Will continue on IVF  - continue on IV pain meds  - surgery on consult          CT ABDOMEN+PELVIS(CONTRAST ONLY)(CPT=74177)     Result Date: 12/7/2024  CONCLUSION:  Small bowel obstruction with transition point in the left upper quadrant, series 2,  image 39.  Trace right-sided hydronephrosis.  Intrahepatic and common bile duct dilatation as well as pancreatic ductal dilatation.  Follow-up MRI/MRCP may be helpful for further evaluation.   LOCATION:  [facility]   Dictated by (CST): Tisha [facility] MD on 12/07/2024 at 8:41 AM     Finalized by (CST): Tisha [facility] MD on 12/07/2024 at 8:46 AM            12/8/24             Subjective:       - Afebrile, VSS; notes that this AM, having small amount of flatus, no BM yet   - Pain controlled, denies other f/c, cp, sob   - NGT with 650cc output recorded   - Pt does note hx of C-sections in the past; no other hx SBO previously     Temp:  [97.9 °F (36.6 °C)-98.6 °F (37 °C)] 97.9 °F (36.6 °C)  Pulse:  [57-77] 57  Resp:  [14-20] 20  BP: ()/(58-64) 111/63  SpO2:  [92 %-94 %] 93 %     Respiratory: no wheezes, no rhonchi  Cardiovascular: S1, S2, regular rate and rhythm  Abdomen: Soft, Non-tender, non-distended, no bowel sounds  Neuro: No new focal deficits.   Extremities: no edema      Lab 12/07/24  0011   WBC 9.3   HGB 13.1   MCV 92.6   .0      Lab 12/07/24  0011   *   BUN 8*   CREATSERUM 0.70   CA 9.8   ALB 4.1      K 3.6      CO2 28.0   ALKPHO 72   AST 26   ALT 17   BILT 0.7   TP 6.8      Lab 12/07/24  0011   TROPHS 3        Assessment & Plan:       # Small Bowel Obstruction    - CT A/P with findings of SBO with transition point in LUQ, intrahepatic ductal dilation as well noted    - NPO, IVF, Antiemetics    - NG tube to LIS   - ADAT per surgery    - General surgery on consult     # Biliary ductal dilation   - Noted on CT A/P on admission   - Trend LFTs; labs on admission WNL, repeat this AM WNL   - MRCP ordered                    GENERAL SURGERY  SBO     Plan:  Patient has had return of bowel function and improvement of symptoms.  Clamp NG x 4 hours, if no nausea or vomiting, and residuals less than 150 cc, may discontinue NG and start clear liquid diet  Analgesics and antiemetics as needed  Patient awaiting MRCP to further evaluate a dilated biliary and pancreatic duct seen on CT.  LFTs within normal limits.  Will follow results.  Ambulate and up to chair  DVT prophylaxis with heparin        DATE OF DISCHARGE: 12/9/24     Vitals (last day) before discharge       Date/Time Temp Pulse Resp BP SpO2 Weight O2 Device O2 Flow Rate (L/min) Who    12/09/24 0803 98.2 °F (36.8 °C) 55 18 110/59 91 % -- None (Room air) -- VL    12/09/24 0515 98.1 °F (36.7 °C) 55 18 103/67 98 % -- None (Room air) -- RR    12/08/24 2030 98.3 °F (36.8 °C) 57 20 115/65 96 % -- None (Room air) -- RR    12/08/24 1539 98.2 °F (36.8 °C) 76 18 143/63 94 % -- None (Room air) -- VS    12/08/24 0736 97.5 °F (36.4 °C) 58 14 95/60 91 % -- None (Room air) -- VS    12/08/24 0400 97.9 °F (36.6 °C) 57 20 111/63 93 % -- None (Room air) -- RR

## 2024-12-10 NOTE — DISCHARGE SUMMARY
[facility]     Allie Chakraborty Patient Status:  Inpatient    1955 MRN [id number]   Location [facility] 3SW-A Attending No att. providers found   Hosp Day # 2 PCP Robin Dodson MD     Date of Admission: 2024  Date of Discharge: 2024  Discharge Disposition: Home or Self Care    Admitting Diagnosis:   SBO (small bowel obstruction) ([facility]) [K56.609]    Hospital Discharge Diagnoses:   Small bowel obstruction  Biliary ductal dilation     Lace+ Score: 62  59-90 High Risk  29-58 Medium Risk  0-28   Low Risk.    TCM Follow-Up Recommendation:  LACE > 58: High Risk of readmission after discharge from the hospital.        Discharge Diagnosis:   Small bowel obstruction    History of Present Illness: Allie Chakraborty is a 69 year old female with no chronic medical issues presents emergency room with abdominal pain.  Pain is located in the right upper quadrant that started a few hours before coming to the emergency room.  Patient states that the pain was up to 8 out of 10.  Patient now states the pain is lessened but is now starting to have some pain in the left upper quadrant and epigastric region.  Patient is having some nausea but no vomiting.  No fevers, chills, diarrhea or constipation.  No hematochezia, melena, hematuria.    Brief Synopsis: Initially presented with abdominal pain, n/v; CT A/P with findings of small bowel obstruction with transition point in LUQ. General surgery consulted, NGT placed, started on IVF. Patient improved with conservative treatment without procedures, able to tolerate soft diet at time of discharge without Nausea/vomiting, had bowel movement prior to discharge. CT A/P incidentally noted to have biliary ductal dilation on admission as well, GI consulted, pt completed MRCP showing ongoing biliary dilation without pankaj masses or stones identified. GI recommending outpatient f/u once SBO fully resolved for EGD with EUS in 2-4 weeks. Pt cleared for  discharge by all consultants and discharged to home with outpatient PCP, GI f/u  . All diagnoses discussed with patient, they demonstrated understanding and plan of care and risks reviewed and in agreement.     Procedures during hospitalization:   None    Incidental or significant findings and recommendations (brief descriptions):  MRCP showing ongoing biliary dilation without pankaj masses or stones identified. GI recommending outpatient f/u once SBO fully resolved for EGD with EUS in 2-4 weeks to rule out malignancy    Lab/Test results pending at Discharge:       Consultants:  GI, General surgery     Discharge Medication List:     Discharge Medications        CONTINUE taking these medications        Instructions Prescription details   Calcium Carb-Cholecalciferol 1000-800 MG-UNIT Tabs      Take 1 tablet by mouth daily.   Refills: 0     COLLAGEN OR      Take by mouth.   Refills: 0     Krill Oil 1000 MG Caps      Take by mouth daily.   Refills: 0     Restasis 0.05 % Emul  Generic drug: cycloSPORINE      Apply 1 drop to eye 2 (two) times daily. 1 drop each eye twice a day.   Refills: 4     Vitamin D 1000 units Tabs      Take by mouth daily.   Refills: 0     Womens 50+ Advanced Caps      Take 2 Tabs by mouth daily.   Refills: 0            STOP taking these medications      amoxicillin 500 MG Caps  Commonly known as: Amoxil        cetirizine-pseudoephedrine ER 5-120 MG Tb12  Commonly known as: ZyrTEC-D        fluticasone propionate 50 MCG/ACT Susp  Commonly known as: Flonase        PEG 3350-KCl-Na Bicarb-NaCl 420 g Solr  Commonly known as: TriLyte        triamcinolone 0.1 % Crea  Commonly known as: Kenalog                 [place]    Follow-up appointment:   Sonny Talbot MD  [address] [place] [address] [phone]    Call in 1 week(s)  For follow-up after hospitilization for scheduling of endoscopy    Robin Dodson MD [address]    [place] [address] [phone]    Schedule  an appointment as soon as possible for a visit in 1 week(s)  For follow-up after hospitilization      Vital signs:       Physical Exam:  See exam from day of discharge note  -----------------------------------------------------------------------------------------------  PATIENT DISCHARGE INSTRUCTIONS: See electronic chart    Karel Sanderson MD 12/10/2024    Time spent:  35 minutes

## 2024-12-12 NOTE — TELEPHONE ENCOUNTER
Pt is going to see the GI doctor who has all her imaging and test results from her hospital stay

## 2024-12-16 NOTE — PAYOR COMM NOTE
--------------  DISCHARGE REVIEW    Payor: [employer] MED[employer] ADV PPO  Subscriber #:  [id number]  Authorization Number: [id number]    Admit date: 24  Admit time:   5:03 AM  Discharge Date: 2024  3:38 PM     Admitting Physician: Robin Daley DO  Attending Physician:  No att. providers found  Primary Care Physician: Robin Dodson MD          Discharge Summary Notes        Discharge Summary signed by Karel Sanderson MD at 12/10/2024  4:17 PM       Author: Karel Sanderson MD Specialty: HOSPITALIST Author Type: Physician    Filed: 12/10/2024  4:17 PM Date of Service: 12/10/2024  4:10 PM Status: Signed    : Karel Sanderson MD (Physician)           [facility]     Allie Chakraborty Patient Status:  Inpatient    1955 MRN [id number]   Location [facility] 3SW-A Attending No att. providers found   Hosp Day # 2 PCP Robin Dodson MD     Date of Admission: 2024  Date of Discharge: 2024  Discharge Disposition: Home or Self Care    Admitting Diagnosis:   SBO (small bowel obstruction) ([facility]) [K56.609]    Hospital Discharge Diagnoses:   Small bowel obstruction  Biliary ductal dilation     Lace+ Score: 62  59-90 High Risk  29-58 Medium Risk  0-28   Low Risk.    TCM Follow-Up Recommendation:  LACE > 58: High Risk of readmission after discharge from the hospital.        Discharge Diagnosis:   Small bowel obstruction    History of Present Illness: Allie Chakraborty is a 69 year old female with no chronic medical issues presents emergency room with abdominal pain.  Pain is located in the right upper quadrant that started a few hours before coming to the emergency room.  Patient states that the pain was up to 8 out of 10.  Patient now states the pain is lessened but is now starting to have some pain in the left upper quadrant and epigastric region.  Patient is having some nausea but no vomiting.  No fevers, chills, diarrhea or constipation.  No  hematochezia, melena, hematuria.    Brief Synopsis: Initially presented with abdominal pain, n/v; CT A/P with findings of small bowel obstruction with transition point in LUQ. General surgery consulted, NGT placed, started on IVF. Patient improved with conservative treatment without procedures, able to tolerate soft diet at time of discharge without Nausea/vomiting, had bowel movement prior to discharge. CT A/P incidentally noted to have biliary ductal dilation on admission as well, GI consulted, pt completed MRCP showing ongoing biliary dilation without pankaj masses or stones identified. GI recommending outpatient f/u once SBO fully resolved for EGD with EUS in 2-4 weeks. Pt cleared for discharge by all consultants and discharged to home with outpatient PCP, GI f/u  . All diagnoses discussed with patient, they demonstrated understanding and plan of care and risks reviewed and in agreement.     Procedures during hospitalization:   None    Incidental or significant findings and recommendations (brief descriptions):  MRCP showing ongoing biliary dilation without pankaj masses or stones identified. GI recommending outpatient f/u once SBO fully resolved for EGD with EUS in 2-4 weeks to rule out malignancy    Lab/Test results pending at Discharge:       Consultants:  GI, General surgery     Discharge Medication List:     Discharge Medications        CONTINUE taking these medications        Instructions Prescription details   Calcium Carb-Cholecalciferol 1000-800 MG-UNIT Tabs      Take 1 tablet by mouth daily.   Refills: 0     COLLAGEN OR      Take by mouth.   Refills: 0     Krill Oil 1000 MG Caps      Take by mouth daily.   Refills: 0     Restasis 0.05 % Emul  Generic drug: cycloSPORINE      Apply 1 drop to eye 2 (two) times daily. 1 drop each eye twice a day.   Refills: 4     Vitamin D 1000 units Tabs      Take by mouth daily.   Refills: 0     Womens 50+ Advanced Caps      Take 2 Tabs by mouth daily.   Refills: 0             STOP taking these medications      amoxicillin 500 MG Caps  Commonly known as: Amoxil        cetirizine-pseudoephedrine ER 5-120 MG Tb12  Commonly known as: ZyrTEC-D        fluticasone propionate 50 MCG/ACT Susp  Commonly known as: Flonase        PEG 3350-KCl-Na Bicarb-NaCl 420 g Solr  Commonly known as: TriLyte        triamcinolone 0.1 % Crea  Commonly known as: Kenalog                 [place]    Follow-up appointment:   Sonny Talbot MD  [address]  [place] [id number] [phone]    Call in 1 week(s)  For follow-up after hospitilization for scheduling of endoscopy    Robin Dodson MD  [address]  [place]  [place] [id number] [phone]    Schedule an appointment as soon as possible for a visit in 1 week(s)  For follow-up after hospitilization      Vital signs:       Physical Exam:  See exam from day of discharge note  -----------------------------------------------------------------------------------------------  PATIENT DISCHARGE INSTRUCTIONS: See electronic chart    Karel Sanderson MD 12/10/2024    Time spent:  35 minutes      Electronically signed by Karel Sanderson MD on 12/10/2024  4:17 PM         REVIEWER COMMENTS

## 2024-12-18 ENCOUNTER — OFFICE VISIT (OUTPATIENT)
Facility: LOCATION | Age: 69
End: 2024-12-18
Payer: MEDICARE

## 2024-12-18 VITALS
BODY MASS INDEX: 19.7 KG/M2 | DIASTOLIC BLOOD PRESSURE: 72 MMHG | HEIGHT: 67.5 IN | WEIGHT: 127 LBS | OXYGEN SATURATION: 99 % | HEART RATE: 89 BPM | TEMPERATURE: 98 F | SYSTOLIC BLOOD PRESSURE: 116 MMHG

## 2024-12-18 DIAGNOSIS — K64.8 INTERNAL BLEEDING HEMORRHOIDS: Primary | ICD-10-CM

## 2024-12-18 DIAGNOSIS — K80.20 GALLSTONES: ICD-10-CM

## 2024-12-18 DIAGNOSIS — K92.1 HEMATOCHEZIA: ICD-10-CM

## 2024-12-18 DIAGNOSIS — K83.8 COMMON BILE DUCT DILATATION: ICD-10-CM

## 2024-12-18 PROCEDURE — 46221 LIGATION OF HEMORRHOID(S): CPT | Performed by: STUDENT IN AN ORGANIZED HEALTH CARE EDUCATION/TRAINING PROGRAM

## 2024-12-18 PROCEDURE — 1111F DSCHRG MED/CURRENT MED MERGE: CPT | Performed by: STUDENT IN AN ORGANIZED HEALTH CARE EDUCATION/TRAINING PROGRAM

## 2024-12-18 PROCEDURE — 3008F BODY MASS INDEX DOCD: CPT | Performed by: STUDENT IN AN ORGANIZED HEALTH CARE EDUCATION/TRAINING PROGRAM

## 2024-12-18 PROCEDURE — 3074F SYST BP LT 130 MM HG: CPT | Performed by: STUDENT IN AN ORGANIZED HEALTH CARE EDUCATION/TRAINING PROGRAM

## 2024-12-18 PROCEDURE — 3078F DIAST BP <80 MM HG: CPT | Performed by: STUDENT IN AN ORGANIZED HEALTH CARE EDUCATION/TRAINING PROGRAM

## 2024-12-20 NOTE — PROGRESS NOTES
Follow Up Visit Note       Active Problems      1. Gallstones    2. Epigastric pain          Chief Complaint   Chief Complaint   Patient presents with    Hemorrhoid Banding     2nd banding, pain right side of lower stomach, acid reflux, no other symptoms.           History of Present Illness  69 year old female who presented to me with hematochezia and internal hemorrhoids. She has been complaining of right sided abdominal pain. Pain is intermittent and sometimes worsened with oral intake. Pain does not radiate. Denies nausea or vomiting, fever or chills constipation or diarrhea, unintentional weight loss.       Allergies  Allie is allergic to wellbutrin [bupropion].    Past Medical / Surgical / Social / Family History    The past medical and past surgical history have been reviewed by me today.    Past Medical History:    Abdominal pain, epigastric    Cellulitis and abscess of unspecified site    Mastodynia    Other enthesopathy of elbow region    Pain in thoracic spine    Rash and other nonspecific skin eruption     Past Surgical History:   Procedure Laterality Date    Breast biopsy  2001          Colonoscopy  2022    Angelia localization wire 1 site left (cpt=19281)       atypical ductal hyperplasia    Other surgical history      Other surgical history  2002    EXCISION OF LEFT BREAST MASS    Other surgical history  10/01/2010    REMOVED A GROWTH ON TONGUE    Tonsillectomy  1960    Tubal ligation      After last c section       The family history and social history have been reviewed by me today.    Family History   Problem Relation Age of Onset    Cancer Maternal Grandmother         BREAST    Breast Cancer Maternal Grandmother 60    Cancer Maternal Grandfather     Cancer Maternal Grandfather     Cancer Father     Thyroid Disorder Mother     Breast Cancer Brother     Polyps Brother      Social History     Socioeconomic History    Marital status:     Occupational History    Occupation: homemaker   Tobacco Use    Smoking status: Some Days     Current packs/day: 0.00     Average packs/day: 0.5 packs/day for 40.0 years (20.0 ttl pk-yrs)     Types: Cigarettes     Last attempt to quit: 2019     Years since quittin.1     Passive exposure: Yes    Smokeless tobacco: Never    Tobacco comments:     Ongoing bauer none in past 6 months   Vaping Use    Vaping status: Never Used   Substance and Sexual Activity    Alcohol use: No    Drug use: No   Other Topics Concern    Caffeine Concern No    Stress Concern No    Weight Concern No    Special Diet No    Exercise No    Seat Belt Yes    Self-Exams Yes     Comment: Monthly        Current Outpatient Medications:     COLLAGEN OR, Take by mouth., Disp: , Rfl:     RESTASIS 0.05 % Ophthalmic Emulsion, Apply 1 drop to eye 2 (two) times daily. 1 drop each eye twice a day., Disp: , Rfl: 4    Cholecalciferol (VITAMIN D) 1000 units Oral Tab, Take by mouth daily., Disp: , Rfl:     Krill Oil 1000 MG Oral Cap, Take by mouth daily., Disp: , Rfl:     Calcium Carb-Cholecalciferol 1000-800 MG-UNIT Oral Tab, Take 1 tablet by mouth daily., Disp: , Rfl:     Multiple Vitamins-Minerals (WOMENS 50+ ADVANCED) Oral Cap, Take 2 Tabs by mouth daily., Disp: , Rfl:      Review of Systems  The Review of Systems has been reviewed by me during today.  Review of Systems     Physical Findings   /75 (BP Location: Left arm, Patient Position: Sitting, Cuff Size: adult)   Pulse 70   Temp 97.4 °F (36.3 °C) (Temporal)   Ht 67\"   Wt 127 lb (57.6 kg)   SpO2 98%   BMI 19.89 kg/m²   Physical Exam  Constitutional:       Appearance: Normal appearance.   HENT:      Head: Normocephalic and atraumatic.   Cardiovascular:      Pulses: Normal pulses.   Pulmonary:      Effort: Pulmonary effort is normal.   Abdominal:      General: Abdomen is flat. There is no distension.      Palpations: Abdomen is soft.      Tenderness: There is no abdominal tenderness.  There is no guarding or rebound. Negative signs include Cunha's sign.   Skin:     General: Skin is warm.      Capillary Refill: Capillary refill takes less than 2 seconds.   Neurological:      Mental Status: She is alert and oriented to person, place, and time. Mental status is at baseline.     The perineum and perianal skin were examined.   There was No abnormality.        Digital rectal exam was performed. There was  good resting anal sphincter tone and good squeeze strength.        Anoscopy was performed. The anal canal and anorectal ring were examined circumferentially.   There was not anal fissure.  There was not anal fistula internal opening.   There was internal hemorrhoid enlargement medium sized internal hemorrhoids.   There was not blood or mucus seen.               Assessment/Plan  1. Gallstones    2. Epigastric pain        Allie Chakraborty is a 69 year old female referred by Robin Dodson MD for evaluation of hemorrhoids, hematochezia, abdominal pain. Her symptoms are consistent with cholelithiasis. I ordered an ultrasound of the gallbladder to confirm the diagnosis. Performed a left lateral hemorrhoid banding today. Patient has follow up with me in 2 weeks.          Orders Placed This Encounter   Procedures    Liver Function Panel (7)       Imaging & Referrals   US ABDOMEN COMPLETE (KVN=[id number])      Alberta Hoyt MD

## 2024-12-20 NOTE — PROGRESS NOTES
Follow Up Visit Note       Active Problems      1. Internal bleeding hemorrhoids    2. Hematochezia    3. Common bile duct dilatation    4. Gallstones          Chief Complaint   Chief Complaint   Patient presents with    Hemorrhoid Banding     3rd banding, PT was admit to  on  for small bowel obstruction, no symptoms.         History of Present Illness  69 year old female who is following up with me for evaluation of hemorrhoids. She reports since her last banding has not noted much bleeding. She reports occasional blood on the wipes. She was admitted to the hospital early December with a small bowel obstruction. During her admission an ultrasound and MRCP showed cholelithiasis a dilated common bile duct and was referred to dr. Watson for evaluation and possible EUS/ERCP. She has an appointment with him on .  She has no abdominal symptoms at this time. She denies melena, unintentional weight loss. She is very anxious of the results and worried about a malignant process.         Allergies  Allie is allergic to wellbutrin [bupropion].    Past Medical / Surgical / Social / Family History    The past medical and past surgical history have been reviewed by me today.    Past Medical History:    Abdominal pain, epigastric    Cellulitis and abscess of unspecified site    Mastodynia    Other enthesopathy of elbow region    Pain in thoracic spine    Rash and other nonspecific skin eruption     Past Surgical History:   Procedure Laterality Date    Breast biopsy  2001          Colonoscopy  2022    Angelia localization wire 1 site left (cpt=19281)       atypical ductal hyperplasia    Other surgical history      Other surgical history  2002    EXCISION OF LEFT BREAST MASS    Other surgical history  10/01/2010    REMOVED A GROWTH ON TONGUE    Tonsillectomy  1960    Tubal ligation      After last c section       The family history and social history have been  reviewed by me today.    Family History   Problem Relation Age of Onset    Cancer Maternal Grandmother         BREAST    Breast Cancer Maternal Grandmother 60    Cancer Maternal Grandfather     Cancer Maternal Grandfather     Cancer Father     Thyroid Disorder Mother     Breast Cancer Brother     Polyps Brother      Social History     Socioeconomic History    Marital status:    Occupational History    Occupation: homemaker   Tobacco Use    Smoking status: Some Days     Current packs/day: 0.00     Average packs/day: 0.5 packs/day for 40.0 years (20.0 ttl pk-yrs)     Types: Cigarettes     Last attempt to quit: 2019     Years since quittin.1     Passive exposure: Yes    Smokeless tobacco: Never    Tobacco comments:     Ongoing bauer none in past 6 months   Vaping Use    Vaping status: Never Used   Substance and Sexual Activity    Alcohol use: No    Drug use: No   Other Topics Concern    Caffeine Concern No    Stress Concern No    Weight Concern No    Special Diet No    Exercise No    Seat Belt Yes    Self-Exams Yes     Comment: Monthly        Current Outpatient Medications:     COLLAGEN OR, Take by mouth., Disp: , Rfl:     RESTASIS 0.05 % Ophthalmic Emulsion, Apply 1 drop to eye 2 (two) times daily. 1 drop each eye twice a day., Disp: , Rfl: 4    Cholecalciferol (VITAMIN D) 1000 units Oral Tab, Take by mouth daily., Disp: , Rfl:     Krill Oil 1000 MG Oral Cap, Take by mouth daily., Disp: , Rfl:     Calcium Carb-Cholecalciferol 1000-800 MG-UNIT Oral Tab, Take 1 tablet by mouth daily., Disp: , Rfl:     Multiple Vitamins-Minerals (WOMENS 50+ ADVANCED) Oral Cap, Take 2 Tabs by mouth daily., Disp: , Rfl:      Review of Systems  The Review of Systems has been reviewed by me during today.  Review of Systems   Constitutional:  Negative for chills, diaphoresis, fatigue and fever.   HENT:  Negative for ear discharge, ear pain and sore throat.    Eyes:  Negative for pain and discharge.   Respiratory:  Negative  for cough, chest tightness and shortness of breath.    Cardiovascular:  Negative for chest pain, palpitations and leg swelling.   Gastrointestinal:  Negative for abdominal distention, abdominal pain, blood in stool, constipation, diarrhea, nausea and vomiting.   Genitourinary:  Negative for dysuria, frequency, hematuria and urgency.   Skin:  Negative for color change, pallor and rash.   Neurological:  Negative for weakness, light-headedness, numbness and headaches.   Hematological:  Negative for adenopathy. Does not bruise/bleed easily.   Psychiatric/Behavioral:  Negative for agitation and confusion.         Physical Findings   /72 (BP Location: Right arm, Patient Position: Sitting, Cuff Size: adult)   Pulse 89   Temp 97.5 °F (36.4 °C) (Temporal)   Ht 67.5\"   Wt 127 lb (57.6 kg)   SpO2 99%   BMI 19.60 kg/m²   Physical Exam  Constitutional:       Appearance: Normal appearance.   HENT:      Head: Normocephalic and atraumatic.   Cardiovascular:      Pulses: Normal pulses.   Pulmonary:      Effort: Pulmonary effort is normal.   Abdominal:      General: Abdomen is flat. There is no distension.      Palpations: Abdomen is soft.      Tenderness: There is no abdominal tenderness. There is no guarding or rebound.   Skin:     General: Skin is warm.      Capillary Refill: Capillary refill takes less than 2 seconds.   Neurological:      Mental Status: She is alert and oriented to person, place, and time. Mental status is at baseline.     The perineum and perianal skin were examined.   There was No abnormality.        Digital rectal exam was performed. There was  good resting anal sphincter tone and good squeeze strength.        Anoscopy was performed. The anal canal and anorectal ring were examined circumferentially.   There was not anal fissure.  There was not anal fistula internal opening.   There was internal hemorrhoid enlargement medium in size.   There was not blood or mucus seen.    Prior banding site is  clear with scar at the area without ulceration or bleeding           Assessment/Plan  1. Internal bleeding hemorrhoids    2. Hematochezia    3. Common bile duct dilatation    4. Gallstones        Allie Chakraborty is a 69 year old female here for evaluation of hemorrhoids and hematochezia. Her hemorrhoids are improving with banding. Her hematochezia improved. She has a follow up with dr. Watson to review the MRCP and assess for EUS/ERCP. If there is evidence of choledocholithiasis then will discuss with her need for cholecystectomy.   She is scheduled for colonoscopy and EGD with me on 1/9 however if plan to proceed with endoscopy with dr. Middleton then I would defer to him performing the procedures at the same time.   Performed a right posterior hemorrhoid today. Follow up on 12/24 .          No orders of the defined types were placed in this encounter.      Imaging & Referrals   None    Follow Up  Follow up on 12/24    Alberta Hoyt MD

## 2024-12-20 NOTE — PROCEDURES
Pre op diagnosis: Internal Hemorrhoids    Post op diagnosis: Same    Procedure: Anoscopy with O-ring Rubber Band Ligation of Internal Hemorrhoids    Surgeon: Alberta Hoyt MD     History of present illness:   This patient has internal hemorrhoids that are symptomatic    Operative findings:   The right posterior internal hemorrhoid was successfully ligated in the standard fashion with an O-ring ligator.      Operative summary:  The patient was draped and exposed in the usual fashion.    A medical assistant was present at all times.    External visualization of the perineum and gluteal cleft was performed.    Digital exam was performed with a well lubricated examining finger.    Diagnostic Anoscopy was performed with lubrication.    The anal canal was well visualized.    An internal hemorrhoid was identified and well visualized.    The internal hemorrhoid was grasped well above the dentate line with the grasper.    The internal hemorrhoid was pulled within the O-ring ligator.    The O-ring ligator was fired without complication.    A 5% phenol solution was injected into the hemorrhoid and at its base.    The patient tolerated the procedure and was observed in our office for at least 5 minutes prior to discharge.    All findings are listed in the physical exam section of this note.

## 2024-12-20 NOTE — PROCEDURES
Pre op diagnosis: Internal Hemorrhoids    Post op diagnosis: Same    Procedure: Anoscopy with O-ring Rubber Band Ligation of Internal Hemorrhoids    Surgeon: Alberta Hoyt MD     History of present illness:   This patient has internal hemorrhoids that are symptomatic    Operative findings:   The left lateral internal hemorrhoid was successfully ligated in the standard fashion with an O-ring ligator.      Operative summary:  The patient was draped and exposed in the usual fashion.    A medical assistant was present at all times.    External visualization of the perineum and gluteal cleft was performed.    Digital exam was performed with a well lubricated examining finger.    Diagnostic Anoscopy was performed with lubrication.    The anal canal was well visualized.    An internal hemorrhoid was identified and well visualized.    The internal hemorrhoid was grasped well above the dentate line with the grasper.    The internal hemorrhoid was pulled within the O-ring ligator.    The O-ring ligator was fired without complication.    A 5% phenol solution was injected into the hemorrhoid and at its base.    The patient tolerated the procedure and was observed in our office for at least 5 minutes prior to discharge.    All findings are listed in the physical exam section of this note.      Statement Selected

## 2024-12-24 ENCOUNTER — APPOINTMENT (OUTPATIENT)
Facility: LOCATION | Age: 69
End: 2024-12-24
Payer: MEDICARE

## 2024-12-24 DIAGNOSIS — Z71.1 PERSON WITH FEARED COMPLAINT IN WHOM NO DIAGNOSIS WAS MADE: Primary | ICD-10-CM

## 2024-12-24 PROBLEM — Z86.0100 HISTORY OF COLONIC POLYPS: Status: ACTIVE | Noted: 2018-05-29

## 2024-12-26 ENCOUNTER — TELEPHONE (OUTPATIENT)
Facility: LOCATION | Age: 69
End: 2024-12-26

## 2024-12-26 DIAGNOSIS — K92.1 HEMATOCHEZIA: Primary | ICD-10-CM

## 2025-01-17 ENCOUNTER — HOSPITAL ENCOUNTER (OUTPATIENT)
Facility: HOSPITAL | Age: 70
Setting detail: HOSPITAL OUTPATIENT SURGERY
Discharge: HOME | End: 2025-01-17
Attending: INTERNAL MEDICINE | Admitting: INTERNAL MEDICINE
Payer: MEDICARE

## 2025-01-17 ENCOUNTER — ANESTHESIA EVENT (OUTPATIENT)
Dept: ENDOSCOPY | Facility: HOSPITAL | Age: 70
End: 2025-01-17
Payer: MEDICARE

## 2025-01-17 ENCOUNTER — ANESTHESIA (OUTPATIENT)
Dept: ENDOSCOPY | Facility: HOSPITAL | Age: 70
End: 2025-01-17
Payer: MEDICARE

## 2025-01-17 VITALS
OXYGEN SATURATION: 100 % | SYSTOLIC BLOOD PRESSURE: 129 MMHG | HEART RATE: 58 BPM | DIASTOLIC BLOOD PRESSURE: 71 MMHG | RESPIRATION RATE: 18 BRPM | WEIGHT: 125 LBS | TEMPERATURE: 97 F | HEIGHT: 67 IN | BODY MASS INDEX: 19.62 KG/M2

## 2025-01-17 DIAGNOSIS — K56.609 SBO (SMALL BOWEL OBSTRUCTION) (HCC): ICD-10-CM

## 2025-01-17 DIAGNOSIS — K83.8 DILATED CBD, ACQUIRED: ICD-10-CM

## 2025-01-17 DIAGNOSIS — Z86.0100 HISTORY OF COLONIC POLYPS: ICD-10-CM

## 2025-01-17 PROCEDURE — 0DBK8ZX EXCISION OF ASCENDING COLON, VIA NATURAL OR ARTIFICIAL OPENING ENDOSCOPIC, DIAGNOSTIC: ICD-10-PCS | Performed by: INTERNAL MEDICINE

## 2025-01-17 PROCEDURE — 0DJ08ZZ INSPECTION OF UPPER INTESTINAL TRACT, VIA NATURAL OR ARTIFICIAL OPENING ENDOSCOPIC: ICD-10-PCS | Performed by: INTERNAL MEDICINE

## 2025-01-17 PROCEDURE — 0DBL8ZX EXCISION OF TRANSVERSE COLON, VIA NATURAL OR ARTIFICIAL OPENING ENDOSCOPIC, DIAGNOSTIC: ICD-10-PCS | Performed by: INTERNAL MEDICINE

## 2025-01-17 PROCEDURE — 88305 TISSUE EXAM BY PATHOLOGIST: CPT | Performed by: INTERNAL MEDICINE

## 2025-01-17 PROCEDURE — BD47ZZZ ULTRASONOGRAPHY OF GASTROINTESTINAL TRACT: ICD-10-PCS | Performed by: INTERNAL MEDICINE

## 2025-01-17 RX ORDER — ONDANSETRON 2 MG/ML
4 INJECTION INTRAMUSCULAR; INTRAVENOUS ONCE AS NEEDED
Status: DISCONTINUED | OUTPATIENT
Start: 2025-01-17 | End: 2025-01-17

## 2025-01-17 RX ORDER — NALOXONE HYDROCHLORIDE 0.4 MG/ML
0.08 INJECTION, SOLUTION INTRAMUSCULAR; INTRAVENOUS; SUBCUTANEOUS ONCE AS NEEDED
Status: DISCONTINUED | OUTPATIENT
Start: 2025-01-17 | End: 2025-01-17

## 2025-01-17 RX ORDER — LIDOCAINE HYDROCHLORIDE 10 MG/ML
INJECTION, SOLUTION EPIDURAL; INFILTRATION; INTRACAUDAL; PERINEURAL AS NEEDED
Status: DISCONTINUED | OUTPATIENT
Start: 2025-01-17 | End: 2025-01-17 | Stop reason: SURG

## 2025-01-17 RX ORDER — SODIUM CHLORIDE, SODIUM LACTATE, POTASSIUM CHLORIDE, CALCIUM CHLORIDE 600; 310; 30; 20 MG/100ML; MG/100ML; MG/100ML; MG/100ML
INJECTION, SOLUTION INTRAVENOUS CONTINUOUS
Status: DISCONTINUED | OUTPATIENT
Start: 2025-01-17 | End: 2025-01-17

## 2025-01-17 RX ADMIN — LIDOCAINE HYDROCHLORIDE 30 MG: 10 INJECTION, SOLUTION EPIDURAL; INFILTRATION; INTRACAUDAL; PERINEURAL at 08:28:00

## 2025-01-17 RX ADMIN — SODIUM CHLORIDE, SODIUM LACTATE, POTASSIUM CHLORIDE, CALCIUM CHLORIDE: 600; 310; 30; 20 INJECTION, SOLUTION INTRAVENOUS at 09:02:00

## 2025-01-17 RX ADMIN — SODIUM CHLORIDE, SODIUM LACTATE, POTASSIUM CHLORIDE, CALCIUM CHLORIDE: 600; 310; 30; 20 INJECTION, SOLUTION INTRAVENOUS at 08:25:00

## 2025-01-17 NOTE — DISCHARGE INSTRUCTIONS
Home Care Instructions for Colonoscopy and/or Gastroscopy with Sedation    Diet:  - Resume your regular diet .  - Start with light meals to minimize bloating.  - Do not drink alcohol today.    Medication:  - If you have questions about resuming your normal medications, please contact your Primary Care Physician.    Activities:  - Take it easy today. Do not return to work today.  - Do not drive today.  - Do not operate any machinery today (including kitchen equipment).    Colonoscopy:  - You may notice some rectal \"spotting\" (a little blood on the toilet tissue) for a day or two after the exam. This is normal.  - If you experience any rectal bleeding (not spotting), persistent tenderness or sharp severe abdominal pains, oral temperature over 100 degrees Fahrenheit, light-headedness or dizziness, or any other problems, contact your doctor.    Gastroscopy:  - You may have a sore throat for 2-3 days following the exam. This is normal. Gargling with warm salt water (1/2 tsp salt to 1 glass warm water) or using throat lozenges will help.  - If you experience any sharp pain in your neck, abdomen or chest, vomiting of blood, oral temperature over 100 degrees Fahrenheit, light-headedness or dizziness, or any other problems, contact your doctor.    **If unable to reach your doctor, please go to the [facility] Emergency Room**    - Your referring physician will receive a full report of your examination.  - If you do not hear from your doctor's office within two weeks of your biopsy, please call them for your results.

## 2025-01-17 NOTE — ANESTHESIA POSTPROCEDURE EVALUATION
[facility]    Allie Chakraborty Patient Status:  Hospital Outpatient Surgery   Age/Gender 69 year old female MRN [id number]   Location [facility] Attending Kem Watson DO   Hosp Day # 0 PCP Robin Dodson MD       Anesthesia Post-op Note    ENDOSCOPIC ULTRASOUND (EUS), ESOPHAGOGASTRODUODENOSCOPY (EGD), COLONOSCOPY with cold snare polypectomy    Procedure Summary       Date: 01/17/25 Room / Location:  ENDOSCOPY 04 /  ENDOSCOPY    Anesthesia Start: 0825 Anesthesia Stop: 0902    Procedures:       ENDOSCOPIC ULTRASOUND (EUS), ESOPHAGOGASTRODUODENOSCOPY (EGD), COLONOSCOPY with cold snare polypectomy      ESOPHAGOGASTRODUODENOSCOPY (EGD)      COLONOSCOPY Diagnosis:       Dilated cbd, acquired      History of colonic polyps      SBO (small bowel obstruction) (HCC)      (EGD/EUS: normal COLON: diverticulosis, polyps)    Surgeons: Kem Watson DO Anesthesiologist: Chago Braun MD    Anesthesia Type: MAC ASA Status: 3            Anesthesia Type: MAC    Vitals Value Taken Time   /72 01/17/25 0903   Temp  01/17/25 0903   Pulse 66 01/17/25 0902   Resp 16 01/17/25 0903   SpO2 99 % 01/17/25 0902   Vitals shown include unfiled device data.        Patient Location: Endoscopy    Anesthesia Type: MAC    Airway Patency: patent    Postop Pain Control: adequate    Mental Status: mildly sedated but able to meaningfully participate in the post-anesthesia evaluation    Nausea/Vomiting: none    Cardiopulmonary/Hydration status: stable euvolemic    Complications: no apparent anesthesia related complications    Postop vital signs: stable    Dental Exam: Unchanged from Postop

## 2025-01-17 NOTE — ANESTHESIA PREPROCEDURE EVALUATION
PRE-OP EVALUATION    Patient Name: Allie Chakraborty    Admit Diagnosis: Dilated cbd, acquired [K83.8]  History of colonic polyps [Z86.0100]  SBO (small bowel obstruction) (HCC) [K56.609]    Pre-op Diagnosis: Dilated cbd, acquired [K83.8]  History of colonic polyps [Z86.0100]  SBO (small bowel obstruction) (HCC) [K56.609]    ENDOSCOPIC ULTRASOUND (EUS), ESOPHAGOGASTRODUODENOSCOPY (EGD), COLONOSCOPY    Anesthesia Procedure: ENDOSCOPIC ULTRASOUND (EUS), ESOPHAGOGASTRODUODENOSCOPY (EGD), COLONOSCOPY  ESOPHAGOGASTRODUODENOSCOPY (EGD)  COLONOSCOPY    Surgeons and Role:     * DholakiaEstebana, DO - Primary    Pre-op vitals reviewed.        Body mass index is 19.58 kg/m².    Current medications reviewed.  Hospital Medications:  No current facility-administered medications on file as of 2025.       Outpatient Medications:   Prescriptions Prior to Admission[1]    Allergies: Wellbutrin [bupropion]      Anesthesia Evaluation    Patient summary reviewed.    Anesthetic Complications  (-) history of anesthetic complications         GI/Hepatic/Renal                                 Cardiovascular        Exercise tolerance: good     MET: >4                                           Endo/Other                                  Pulmonary        (+) COPD                   Neuro/Psych                              Patient Active Problem List:     Chronic rhinitis     TMJ (temporomandibular joint syndrome)     Low HDL (under 40)     Internal hemorrhoids     History of colonic polyps     Diverticulosis of large intestine without diverticulitis     Pulmonary emphysema (HCC)     Other dysphagia     SBO (small bowel obstruction) (HCC)     Dilation of biliary tract     Other fatigue            Past Surgical History:   Procedure Laterality Date    Breast biopsy  2001      /1995    x 3    Colonoscopy  2022    Angelia localization wire 1 site left (cpt=19281)      2002 atypical ductal hyperplasia    Other surgical  history      Other surgical history  01/01/2002    EXCISION OF LEFT BREAST MASS    Other surgical history  10/01/2010    REMOVED A GROWTH ON TONGUE    Tonsillectomy  09/01/1960    Tubal ligation  I/1995    After last c section     Social History     Socioeconomic History    Marital status:    Occupational History    Occupation: homemaker   Tobacco Use    Smoking status: Never     Passive exposure: Yes    Smokeless tobacco: Never    Tobacco comments:     On and off.  Limited   Vaping Use    Vaping status: Never Used   Substance and Sexual Activity    Alcohol use: No    Drug use: No   Other Topics Concern    Caffeine Concern No    Stress Concern No    Weight Concern No    Special Diet No    Exercise No    Seat Belt Yes    Self-Exams Yes     Comment: Monthly     History   Drug Use No     Available pre-op labs reviewed.  Lab Results   Component Value Date    WBC 5.3 12/08/2024    RBC 3.65 (L) 12/08/2024    HGB 11.3 (L) 12/08/2024    HCT 34.6 (L) 12/08/2024    MCV 94.8 12/08/2024    MCH 31.0 12/08/2024    MCHC 32.7 12/08/2024    RDW 13.2 12/08/2024    .0 12/08/2024     Lab Results   Component Value Date     12/08/2024    K 3.7 12/08/2024     12/08/2024    CO2 26.0 12/08/2024    BUN 8 (L) 12/08/2024    CREATSERUM 0.62 12/08/2024    GLU 87 12/08/2024    CA 8.2 (L) 12/08/2024            Airway      Mallampati: II  Mouth opening: >3 FB  TM distance: 4 - 6 cm  Neck ROM: full Cardiovascular      Rhythm: regular  Rate: normal     Dental    Dentition appears grossly intact         Pulmonary      Breath sounds clear to auscultation bilaterally.               Other findings              ASA: 3   Plan: MAC  NPO status verified and patient meets guidelines.    Post-procedure pain management plan discussed with surgeon and patient.      Plan/risks discussed with: patient                Present on Admission:  **None**             [1]   Medications Prior to Admission   Medication Sig Dispense Refill Last  Dose/Taking    COLLAGEN OR Take by mouth.   Taking    RESTASIS 0.05 % Ophthalmic Emulsion Apply 1 drop to eye 2 (two) times daily. 1 drop each eye twice a day.  4 Taking    Krill Oil 1000 MG Oral Cap Take by mouth daily.   Taking    Calcium Carb-Cholecalciferol 1000-800 MG-UNIT Oral Tab Take 1 tablet by mouth daily.   Taking    Multiple Vitamins-Minerals (WOMENS 50+ ADVANCED) Oral Cap Take 1 tablet by mouth daily.   Taking    polyethylene glycol, PEG 3350-KCl-NaBcb-NaCl-NaSulf, 236 g Oral Recon Soln Take as directed by physician 4000 mL 0

## 2025-01-17 NOTE — OPERATIVE REPORT
Allie Chakraborty Patient Status:  Hospital Outpatient Surgery    1955 MRN [id number]   [facility] ENDOSCOPY PAIN CENTER Attending Kme Watson DO   Hosp Day # 0 PCP Robin Dodson MD     PREOPERATIVE DIAGNOSIS/INDICATION: Dilated Pancreatic and Bile Duct on MRCP  POSTOPERTATIVE DIAGNOSIS: SEE IMPRESSION  PROCEDURE PERFORMED: EGD/EUS  DATE: 25  TIME OUT WAS PERFORMED    SEDATION: MAC sedation provided by General Anesthesia    INFORMED CONSENT: I have discussed the risks, benefits, and alternatives to endoscopic ultrasound with possible fine needle aspiration with the patient including but not limited to the risks of bleeding, infection, pancreatitis, bile peritonitis, malignant seeding, pain, as well as the risks of anesthesia and perforation all possibly leading to prolonged hospitalization, surgical intervention, and even death. All questions were answered to the patient’s satisfaction. The patient elected to proceed with endoscopic ultrasound with intervention as indicated.  PROCEDURE DESCRIPTION:   The linear echoendoscope was introduced into the patient’s mouth, hypopharynx, esophagus, stomach and the first and second portion of the duodenum.  Examination of the mucosa was not performed due to limited views with the echoendoscope. The patient tolerated the procedure well and there were no immediate complications noted during the procedure, the patient was transported to the recovery area in stable condition.    ENDOSCOPIC FINDINGS:  Esophagus: Normal.   Stomach: Normal.   Duodenum: Mild bulbar duodenitis.     ENDOSONOGRAPHIC FINDINGS:    The exam began with a linear echoendoscope. The parenchyma of the pancreatic head, body, tail and uncinate process were normal in appearance. The pancreatic duct was normal caliber measuring 2 mm in the body and mildly dilated at 4 mm in the head of the pancreas. The common bile duct was normal in caliber measuring 3 mm in the head of the  pancreas and mildly dilated to 8 mm at the common hepatic duct. No masses or cysts were visualized. No stones were visualized in the gallbladder. The ampulla was visualized and was normal in appearance. No stricture was identified.     IMPRESSION:     1. Mildly dilated pancreatic and bile duct without stone, stricture or mass lesion.    2. Mild bulbar duodenitis.   RECOMMENDATIONS:    1. No further work up required.     SASHA [facility]  Gastroenterology/Advanced Endoscopy  [facility]

## 2025-01-17 NOTE — OPERATIVE REPORT
Allie Chakraborty Patient Status:  [facility] Outpatient Surgery    1955 MRN [id number]   [facility] ENDOSCOPY PAIN CENTER Attending Kem Watson DO   Hosp Day # 0 PCP Robin Dodson MD       PREOPERATIVE DIAGNOSIS/INDICATION: History of Colon Polyps  POSTOPERTATIVE DIAGNOSIS: See Impression  PROCEDURE PERFORMED: COLONOSCOPY with SNARE  DATE: 25  SEDATION: MAC sedation provided by General Anesthesia    TIME OUT WAS PERFORMED    INFORMED CONSENT: I have discussed the risks, benefits, and alternatives to colonoscopy with the patient including but not limited to the risks of bleeding, infection, pain, as well as the risks of anesthesia and perforation all possibly leading to prolonged hospitalization, surgical intervention. I explained that 5-10 % of polyps may be missed even in the best of all circumstances. All questions were answered to the patient’s satisfaction. The patient elected to proceed with colonoscopy with intervention as indicated.  PROCEDURE DESCRIPTION: After careful digital rectal examination a  colonoscope was introduced into the patients rectum, advanced beyond the recto sigmoid junction, into the descending colon, splenic flexure, transverse colon, hepatic flexure, ascending colon, cecum and the last 5-10 cm of the terminal ileum, confirmed by landmarks, including the appendiceal orifice and ileocecal valve. Careful examination of the above described areas was performed on withdrawal of the endoscope. Retroflexion was performed in the rectum. The patient tolerated the procedure well.  There were no immediate complications immediately following the procedure.  The patient was transferred to recovery in stable condition.  QUALITY OF PREPARATION: [place] Bowel Preparation Scale:    Right colon 3, Transverse colon 3, Left colon 3   FINDINGS:  Terminal Ileum: Normal mucosa  Cecum: The mucosa and vascular pattern were normal.   Ascending colon: 10 mm flat polyp s/p cold  snare polypectomy.   Transverse colon: 7 mm flat polyp s/p cold snare polypectomy.   Descending colon: Diverticulosis.   Sigmoid colon: Diverticulosis.   Rectum: The mucosa and vascular pattern were normal. Retroflexion revealed internal hemorrhoids.     IMPRESSION:   1. Colon Polyps.    2. Diverticulosis.    3. Internal Hemorrhoids.     RECOMMENDATIONS:   1. Await pathology results.    2. Repeat colonoscopy in 3 years.       SASHA [facility]  Gastroenterology/Advanced Endoscopy  [facility].

## 2025-01-24 NOTE — H&P
History & Physical Examination    Patient Name: Allie Chakraborty  MRN: [id number]  CSN: [id number]  YOB: 1955    Diagnosis: dilate cbd/pd, history of polyps    Present Illness: 70 y/o F history as above presents for EGD/EUS/Colonoscopy.     Prescriptions Prior to Admission[1]  No current facility-administered medications for this encounter.       Allergies: Allergies[2]    Past Medical History:    Abdominal pain, epigastric    Anxiety    Current    Back pain    Just tecent    Belching    Cellulitis and abscess of unspecified site    Constipation    Fatigue    Not always    Hemorrhoids    Hoarseness, chronic    Always had deep voice    Indigestion    Leaking of urine    Not bad    Mastodynia    Night sweats    Not always    Other enthesopathy of elbow region    Pain in thoracic spine    Painful urination    Just prior to blockage    Rash and other nonspecific skin eruption    Shortness of breath    Prior to hosp stay    Stress    Current    Visual impairment    reading glasses    Wears glasses    Glasses     Past Surgical History:   Procedure Laterality Date    Breast biopsy  2001      /1995    x 3    Colonoscopy  2022    Colonoscopy N/A 2025    Procedure: COLONOSCOPY;  Surgeon: Kem Watson DO;  Location:  ENDOSCOPY    Angelia localization wire 1 site left (cpt=19281)       atypical ductal hyperplasia    Other surgical history      Other surgical history  2002    EXCISION OF LEFT BREAST MASS    Other surgical history  10/01/2010    REMOVED A GROWTH ON TONGUE    Tonsillectomy  1960    Tubal ligation      After last c section     Family History   Problem Relation Age of Onset    Cancer Maternal Grandmother         BREAST    Breast Cancer Maternal Grandmother 60    Cancer Maternal Grandfather     Cancer Maternal Grandfather     Cancer Father     Thyroid Disorder Mother     Breast Cancer Brother     Polyps Brother      Social History     Tobacco  Use    Smoking status: Never     Passive exposure: Yes    Smokeless tobacco: Never    Tobacco comments:     On and off.  Limited   Substance Use Topics    Alcohol use: No       SYSTEM Check if Review is Normal Check if Physical Exam is Normal If not normal, please explain:   HEENT [ x] [x ]    NECK & BACK [ x] [ x]    HEART [ x] [x ]    LUNGS [ x] [ x]    ABDOMEN [ x] [x ]    UROGENITAL [ n/a] [ n/a]    EXTREMITIES [ x] [x ]    OTHER        [ x ] I have discussed the risks and benefits and alternatives with the patient/family.  They understand and agree to proceed with plan of care.  [ x ] I have reviewed the History and Physical done within the last 30 days.  Any changes noted above.    Kem Watson DO  1/24/2025  12:27 PM             [1]   No medications prior to admission.   [2]   Allergies  Allergen Reactions    Wellbutrin [Bupropion] JITO

## 2025-02-07 NOTE — ADDENDUM NOTE
Addended by: SHIRA ALLISON on: 2/7/2025 04:00 PM     Modules accepted: Orders, Level of Service

## 2025-04-15 ENCOUNTER — TELEPHONE (OUTPATIENT)
Dept: FAMILY MEDICINE CLINIC | Facility: CLINIC | Age: 70
End: 2025-04-15

## 2025-04-15 DIAGNOSIS — Z13.6 SCREENING FOR CARDIOVASCULAR CONDITION: ICD-10-CM

## 2025-04-15 DIAGNOSIS — Z13.0 SCREENING FOR IRON DEFICIENCY ANEMIA: Primary | ICD-10-CM

## 2025-04-15 DIAGNOSIS — E83.51 HYPOCALCEMIA: ICD-10-CM

## 2025-04-15 DIAGNOSIS — R73.9 HYPERGLYCEMIA: ICD-10-CM

## 2025-04-15 NOTE — TELEPHONE ENCOUNTER
Please enter lab orders for the patient's upcoming physical appointment.     Physical scheduled:   Your appointments       Date & Time Appointment Department ([place])    May 15, 2025 8:30 AM CDT MA Supervisit with Maged Liang PA [facility] ([facility])              [facility]  [address] [place] [address] [place] [id number]  [phone]           Preferred lab: [facility] ([facility])     The patient has been notified to complete fasting labs prior to their physical appointment.

## 2025-04-29 ENCOUNTER — LAB ENCOUNTER (OUTPATIENT)
Dept: LAB | Facility: HOSPITAL | Age: 70
End: 2025-04-29
Attending: FAMILY MEDICINE
Payer: MEDICARE

## 2025-04-29 DIAGNOSIS — R73.9 HYPERGLYCEMIA: ICD-10-CM

## 2025-04-29 DIAGNOSIS — Z13.6 SCREENING FOR CARDIOVASCULAR CONDITION: ICD-10-CM

## 2025-04-29 DIAGNOSIS — Z13.0 SCREENING FOR IRON DEFICIENCY ANEMIA: ICD-10-CM

## 2025-04-29 DIAGNOSIS — K80.20 GALLSTONES: ICD-10-CM

## 2025-04-29 DIAGNOSIS — E83.51 HYPOCALCEMIA: ICD-10-CM

## 2025-04-29 LAB
ALBUMIN SERPL-MCNC: 4.4 G/DL (ref 3.2–4.8)
ALBUMIN/GLOB SERPL: 2.1 {RATIO} (ref 1–2)
ALP LIVER SERPL-CCNC: 72 U/L (ref 55–142)
ALT SERPL-CCNC: 14 U/L (ref 10–49)
ANION GAP SERPL CALC-SCNC: 1 MMOL/L (ref 0–18)
AST SERPL-CCNC: 24 U/L (ref ?–34)
BASOPHILS # BLD AUTO: 0.03 X10(3) UL (ref 0–0.2)
BASOPHILS NFR BLD AUTO: 0.5 %
BILIRUB DIRECT SERPL-MCNC: 0.2 MG/DL (ref ?–0.3)
BILIRUB SERPL-MCNC: 0.7 MG/DL (ref 0.2–1.1)
BUN BLD-MCNC: <5 MG/DL (ref 9–23)
CALCIUM BLD-MCNC: 9.4 MG/DL (ref 8.7–10.6)
CHLORIDE SERPL-SCNC: 106 MMOL/L (ref 98–112)
CHOLEST SERPL-MCNC: 158 MG/DL (ref ?–200)
CO2 SERPL-SCNC: 31 MMOL/L (ref 21–32)
CREAT BLD-MCNC: 0.75 MG/DL (ref 0.55–1.02)
EGFRCR SERPLBLD CKD-EPI 2021: 86 ML/MIN/1.73M2 (ref 60–?)
EOSINOPHIL # BLD AUTO: 0.07 X10(3) UL (ref 0–0.7)
EOSINOPHIL NFR BLD AUTO: 1.2 %
ERYTHROCYTE [DISTWIDTH] IN BLOOD BY AUTOMATED COUNT: 12.6 %
EST. AVERAGE GLUCOSE BLD GHB EST-MCNC: 114 MG/DL (ref 68–126)
FASTING PATIENT LIPID ANSWER: YES
FASTING STATUS PATIENT QL REPORTED: YES
GLOBULIN PLAS-MCNC: 2.1 G/DL (ref 2–3.5)
GLUCOSE BLD-MCNC: 88 MG/DL (ref 70–99)
HBA1C MFR BLD: 5.6 % (ref ?–5.7)
HCT VFR BLD AUTO: 38.2 % (ref 35–48)
HDLC SERPL-MCNC: 55 MG/DL (ref 40–59)
HGB BLD-MCNC: 12.5 G/DL (ref 12–16)
IMM GRANULOCYTES # BLD AUTO: 0.01 X10(3) UL (ref 0–1)
IMM GRANULOCYTES NFR BLD: 0.2 %
LDLC SERPL CALC-MCNC: 88 MG/DL (ref ?–100)
LYMPHOCYTES # BLD AUTO: 1.71 X10(3) UL (ref 1–4)
LYMPHOCYTES NFR BLD AUTO: 28.5 %
MCH RBC QN AUTO: 30.9 PG (ref 26–34)
MCHC RBC AUTO-ENTMCNC: 32.7 G/DL (ref 31–37)
MCV RBC AUTO: 94.6 FL (ref 80–100)
MONOCYTES # BLD AUTO: 0.56 X10(3) UL (ref 0.1–1)
MONOCYTES NFR BLD AUTO: 9.3 %
NEUTROPHILS # BLD AUTO: 3.61 X10 (3) UL (ref 1.5–7.7)
NEUTROPHILS # BLD AUTO: 3.61 X10(3) UL (ref 1.5–7.7)
NEUTROPHILS NFR BLD AUTO: 60.3 %
NONHDLC SERPL-MCNC: 103 MG/DL (ref ?–130)
PLATELET # BLD AUTO: 223 10(3)UL (ref 150–450)
POTASSIUM SERPL-SCNC: 3.9 MMOL/L (ref 3.5–5.1)
PROT SERPL-MCNC: 6.5 G/DL (ref 5.7–8.2)
RBC # BLD AUTO: 4.04 X10(6)UL (ref 3.8–5.3)
SODIUM SERPL-SCNC: 138 MMOL/L (ref 136–145)
TRIGL SERPL-MCNC: 77 MG/DL (ref 30–149)
VLDLC SERPL CALC-MCNC: 12 MG/DL (ref 0–30)
WBC # BLD AUTO: 6 X10(3) UL (ref 4–11)

## 2025-04-29 PROCEDURE — 80061 LIPID PANEL: CPT

## 2025-04-29 PROCEDURE — 85025 COMPLETE CBC W/AUTO DIFF WBC: CPT

## 2025-04-29 PROCEDURE — 36415 COLL VENOUS BLD VENIPUNCTURE: CPT

## 2025-04-29 PROCEDURE — 80053 COMPREHEN METABOLIC PANEL: CPT

## 2025-04-29 PROCEDURE — 82248 BILIRUBIN DIRECT: CPT

## 2025-04-29 PROCEDURE — 83036 HEMOGLOBIN GLYCOSYLATED A1C: CPT

## 2025-05-08 ENCOUNTER — HOSPITAL ENCOUNTER (OUTPATIENT)
Age: 70
Discharge: HOME | End: 2025-05-08
Payer: MEDICARE

## 2025-05-08 VITALS
SYSTOLIC BLOOD PRESSURE: 127 MMHG | HEART RATE: 70 BPM | TEMPERATURE: 98 F | DIASTOLIC BLOOD PRESSURE: 75 MMHG | RESPIRATION RATE: 18 BRPM | OXYGEN SATURATION: 98 %

## 2025-05-08 DIAGNOSIS — M25.532 LEFT WRIST PAIN: Primary | ICD-10-CM

## 2025-05-08 PROCEDURE — 99213 OFFICE O/P EST LOW 20 MIN: CPT | Performed by: PHYSICIAN ASSISTANT

## 2025-05-08 NOTE — ED PROVIDER NOTES
Patient Seen in: [facility]      History     Chief Complaint   Patient presents with    Swelling Edema     Stated Complaint: swollen left hand    Subjective:   HPI    Patient is a 70-year-old right-hand-dominant female that presents to immediate care due to left wrist pain x 2 days.  Patient states on Tuesday she increased her activity doing household chores.  States later that night she developed pain and swelling along the lateral aspect of her left wrist.  Patient has been treating with IcyHot, Tylenol and aspirin with significant improvement.  States that pain and swelling has resolved.  Denies history of gout, acute injury or fall.  History of Present Illness               Objective:     Past Medical History:    Abdominal pain, epigastric    Anxiety    Current    Back pain    Just tecent    Belching    Cellulitis and abscess of unspecified site    Constipation    Fatigue    Not always    Hemorrhoids    Hoarseness, chronic    Always had deep voice    Indigestion    Leaking of urine    Not bad    Mastodynia    Night sweats    Not always    Other enthesopathy of elbow region    Pain in thoracic spine    Painful urination    Just prior to blockage    Rash and other nonspecific skin eruption    Shortness of breath    Prior to hosp stay    Stress    Current    Visual impairment    reading glasses    Wears glasses    Glasses              Past Surgical History:   Procedure Laterality Date    Breast biopsy  2001      1988/1992/1995    x 3    Colonoscopy  2022    Colonoscopy N/A 2025    Procedure: COLONOSCOPY;  Surgeon: Kem Watson DO;  Location:  ENDOSCOPY    Angelia localization wire 1 site left (cpt=19281)       atypical ductal hyperplasia    Other surgical history      Other surgical history  2002    EXCISION OF LEFT BREAST MASS    Other surgical history  10/01/2010    REMOVED A GROWTH ON TONGUE    Tonsillectomy  1960    Tubal ligation      After  last c section                Social History     Socioeconomic History    Marital status:    Occupational History    Occupation: homemaker   Tobacco Use    Smoking status: Never     Passive exposure: Yes    Smokeless tobacco: Never    Tobacco comments:     On and off.  Limited   Vaping Use    Vaping status: Never Used   Substance and Sexual Activity    Alcohol use: No    Drug use: No   Other Topics Concern    Caffeine Concern No    Stress Concern No    Weight Concern No    Special Diet No    Exercise No    Seat Belt Yes    Self-Exams Yes     Comment: Monthly     Social Drivers of Health     Food Insecurity: Unknown (12/7/2024)    Food Insecurity     Food Insecurity: Patient declined   Transportation Needs: Unknown (12/7/2024)    Transportation Needs     Lack of Transportation: Patient declined   Housing Stability: Unknown (12/7/2024)    Housing Stability     Housing Instability: Patient declined              Review of Systems    Positive for stated complaint: swollen left hand  Other systems are as noted in HPI.  Constitutional and vital signs reviewed.      All other systems reviewed and negative except as noted above.                  Physical Exam     ED Triage Vitals [05/08/25 0917]   /75   Pulse 70   Resp 18   Temp 98.3 °F (36.8 °C)   Temp src Oral   SpO2 98 %   O2 Device None (Room air)       Current Vitals:   Vital Signs  BP: 127/75  Pulse: 70  Resp: 18  Temp: 98.3 °F (36.8 °C)  Temp src: Oral    Oxygen Therapy  SpO2: 98 %  O2 Device: None (Room air)        Physical Exam  Vital signs reviewed. Nursing note reviewed.  Constitutional: Well-developed. Well-nourished. In no acute distress  HENT: Mucous membranes moist.   EYES: No scleral icterus or conjunctival injection.  NECK: Full ROM. Supple.   CARDIAC: Normal rate. Normal S1/ S2.   PULM/CHEST:  No wheezes  Extremities: Full ROM of left wrist and hand.  No overlying edema erythema warmth ecchymosis or lacerations of left hand or wrist.  Radial  pulse 2+ bilaterally.  5 out of 5  strength bilaterally.  NEURO: Awake, alert, following commands, moving extremities, answering questions.   SKIN: Warm and dry. No rash or lesions.  PSYCH: Normal judgment. Normal affect.      Physical Exam                           MDM      Patient is a 70-year-old female that presents to immediate care due to left wrist pain x 2 days.  Patient arrives with stable vitals.  Physical exam showing no obvious edema or palpable tenderness with full range of motion strength of left hand and wrist.  Most likely arthralgia, arthritis.  Considered x-ray images however patient declined at this time.  Unlikely underlying fracture or dislocation as patient has no injury or fall.  Patient Senthil that she has follow-up with her PCP in over the next few days.  Return protocols including worsening pain increased edema or skin color changes.  Patient agreeable to plan all questions answered.  History given by patient.        Medical Decision Making      Disposition and Plan     Clinical Impression:  1. Left wrist pain         Disposition:  Discharge  5/8/2025  9:28 am    Follow-up:  Robin Dodson MD  [address] [place] [id number]  [phone]    Call             Medications Prescribed:  Current Discharge Medication List          Supplementary Documentation:

## 2025-05-08 NOTE — ED INITIAL ASSESSMENT (HPI)
Pt with c/o left hand pain 2 days ago.  Pt noticed swelling.  Pt states swelling has resolved.  Pt states has physical with pmd next week

## 2025-05-15 ENCOUNTER — OFFICE VISIT (OUTPATIENT)
Dept: FAMILY MEDICINE CLINIC | Facility: CLINIC | Age: 70
End: 2025-05-15
Payer: MEDICARE

## 2025-05-15 VITALS
BODY MASS INDEX: 19.78 KG/M2 | WEIGHT: 126 LBS | DIASTOLIC BLOOD PRESSURE: 70 MMHG | RESPIRATION RATE: 12 BRPM | TEMPERATURE: 98 F | HEIGHT: 67 IN | HEART RATE: 60 BPM | OXYGEN SATURATION: 96 % | SYSTOLIC BLOOD PRESSURE: 100 MMHG

## 2025-05-15 DIAGNOSIS — Z00.00 ENCOUNTER FOR ANNUAL HEALTH EXAMINATION: Primary | ICD-10-CM

## 2025-05-15 DIAGNOSIS — Z87.891 HISTORY OF TOBACCO USE: ICD-10-CM

## 2025-05-15 DIAGNOSIS — J43.9 PULMONARY EMPHYSEMA, UNSPECIFIED EMPHYSEMA TYPE (HCC): ICD-10-CM

## 2025-05-15 DIAGNOSIS — Z72.0 TOBACCO USE: ICD-10-CM

## 2025-05-15 PROCEDURE — 1159F MED LIST DOCD IN RCRD: CPT | Performed by: PHYSICIAN ASSISTANT

## 2025-05-15 PROCEDURE — 1160F RVW MEDS BY RX/DR IN RCRD: CPT | Performed by: PHYSICIAN ASSISTANT

## 2025-05-15 PROCEDURE — 3078F DIAST BP <80 MM HG: CPT | Performed by: PHYSICIAN ASSISTANT

## 2025-05-15 PROCEDURE — 3008F BODY MASS INDEX DOCD: CPT | Performed by: PHYSICIAN ASSISTANT

## 2025-05-15 PROCEDURE — 1170F FXNL STATUS ASSESSED: CPT | Performed by: PHYSICIAN ASSISTANT

## 2025-05-15 PROCEDURE — G0439 PPPS, SUBSEQ VISIT: HCPCS | Performed by: PHYSICIAN ASSISTANT

## 2025-05-15 PROCEDURE — 3074F SYST BP LT 130 MM HG: CPT | Performed by: PHYSICIAN ASSISTANT

## 2025-05-15 PROCEDURE — 96160 PT-FOCUSED HLTH RISK ASSMT: CPT | Performed by: PHYSICIAN ASSISTANT

## 2025-05-15 PROCEDURE — 1125F AMNT PAIN NOTED PAIN PRSNT: CPT | Performed by: PHYSICIAN ASSISTANT

## 2025-05-21 NOTE — PROGRESS NOTES
Subjective:   Allie Chakraborty is a 70 year old female who presents for a MA AHA (Medicare Advantage Annual Health Assessment) and Subsequent Annual Wellness visit (Pt already had Initial Annual Wellness) and scheduled follow up of multiple significant but stable problems.   History of Present Illness      - Hospitalized in December for SBO. Incidentally found dilated pancreatic/ GB duct. Further evaluation with Dr. Watson in January- unremarkable.  - Due for chest CT. Continues with peridodic tobacco use. Has found it difficult to fully be done with tobacco use. Previous follow up CT did show stabilitiy at lung fissure and moderate emphysema. Not using any inhalers.      History/Other:   Fall Risk Assessment:   She has been screened for Falls and is low risk.      Cognitive Assessment:   She had a completely normal cognitive assessment - see flowsheet entries     Functional Ability/Status:   Allie Chakraborty has a completely normal functional assessment. See flowsheet for details.      Depression Screening (PHQ):  PHQ-2 SCORE: 0  , done 5/15/2025   Feeling tired or having little energy: 1              Advanced Directives:   She does have a Living Will but we do NOT have it on file in Epic.    She does have a POA but we do NOT have it on file in Epic.    Discussed Advance Care Planning with patient (and family/surrogate if present). Standard forms made available to patient in After Visit Summary.      Patient Active Problem List   Diagnosis    Chronic rhinitis    TMJ (temporomandibular joint syndrome)    Low HDL (under 40)    Internal hemorrhoids    History of colonic polyps    Diverticulosis of large intestine without diverticulitis    Pulmonary emphysema (HCC)    Other dysphagia    SBO (small bowel obstruction) (HCC)    Dilation of biliary tract    Other fatigue     Allergies:  She is allergic to wellbutrin [bupropion].    Current Medications:  Outpatient Medications Marked as Taking for the 5/15/25  encounter (Office Visit) with Maged Liang PA   Medication Sig    RESTASIS 0.05 % Ophthalmic Emulsion Apply 1 drop to eye 2 (two) times daily. 1 drop each eye twice a day.    Krill Oil 1000 MG Oral Cap Take by mouth daily.    Calcium Carb-Cholecalciferol 1000-800 MG-UNIT Oral Tab Take 1 tablet by mouth daily.    Multiple Vitamins-Minerals (WOMENS 50+ ADVANCED) Oral Cap Take 1 tablet by mouth daily.       Medical History:  She  has a past medical history of Abdominal pain, epigastric, Anxiety (?), Back pain (2 e months), Belching (), Cellulitis and abscess of unspecified site, Constipation (), Fatigue (6 months), Hemorrhoids, Hoarseness, chronic (?), Indigestion (), Leaking of urine (), Mastodynia, Night sweats (1 yr), Other enthesopathy of elbow region, Pain in thoracic spine, Painful urination (), Rash and other nonspecific skin eruption, Shortness of breath (?), Stress (?), Visual impairment, and Wears glasses (4 yrs).  Surgical History:  She  has a past surgical history that includes  (); tonsillectomy (1960); other surgical history; other surgical history (2002); other surgical history (10/01/2010); [place] localization wire 1 site left (cpt=19281); colonoscopy (2022); Breast biopsy (2001); tubal ligation (); and colonoscopy (N/A, 2025).   Family History:  Her family history includes Breast Cancer in her brother; Breast Cancer (age of onset: 60) in her maternal grandmother; Cancer in her father, maternal grandfather, maternal grandfather, and maternal grandmother; Polyps in her brother; Thyroid Disorder in her mother.  Social History:  She  reports that she has been smoking cigarettes. She has a 20 pack-year smoking history. She has been exposed to tobacco smoke. She has never used smokeless tobacco. She reports that she does not drink alcohol and does not use drugs.    Tobacco:  Social History     Tobacco Use   Smoking Status Some  Days    Current packs/day: 0.00    Average packs/day: 0.5 packs/day for 40.0 years (20.0 ttl pk-yrs)    Types: Cigarettes    Last attempt to quit: 2019    Years since quittin.5    Passive exposure: Yes   Smokeless Tobacco Never   Tobacco Comments    On and off.  Limited     E-Cigarettes/Vaping       Questions Responses    E-Cigarette Use Never User           Tobacco cessation counseling for <3 minutes.      CAGE Alcohol Screen:   CAGE screening score of 0 on 5/15/2025, showing low risk of alcohol abuse.      Patient Care Team:  Robin Dodson MD as PCP - General (Family Practice)  Alberta Hoyt MD (SURGERY, GENERAL)  Kem Watson DO (GASTROENTEROLOGY)    Review of Systems       Objective:   Physical Exam  General Appearance:  Alert, cooperative, no distress, appears stated age   Head:  Normocephalic, without obvious abnormality, atraumatic   Eyes:  PERRL, conjunctiva/corneas clear, EOM's intact both eyes   Ears:  Normal TM's and external ear canals, both ears   Nose: Nares normal, septum midline,mucosa normal, no drainage or sinus tenderness   Throat: Lips, mucosa, and tongue normal; teeth and gums normal   Neck: Supple, symmetrical, trachea midline, no adenopathy;  thyroid: not enlarged, symmetric, no tenderness/mass/nodules; no carotid bruit or JVD   Back:   Symmetric, no curvature, ROM normal, no CVA tenderness   Lungs:   Clear to auscultation bilaterally, respirations unlabored   Heart:  Regular rate and rhythm, S1 and S2 normal, no murmur, rub, or gallop   Abdomen:   Soft, non-tender, bowel sounds active all four quadrants,  no masses, no organomegaly   Pelvic: Deferred   Extremities: Extremities normal, atraumatic, no cyanosis or edema   Pulses: 2+ and symmetric   Skin: Skin color, texture, turgor normal, no rashes or lesions   Lymph nodes: Cervical, supraclavicular, and axillary nodes normal   Neurologic: Normal       /70   Pulse 60   Temp 97.6 °F (36.4 °C)   Resp 12   Ht 5' 7\"  (1.702 m)   Wt 126 lb (57.2 kg)   SpO2 96%   BMI 19.73 kg/m²  Estimated body mass index is 19.73 kg/m² as calculated from the following:    Height as of this encounter: 5' 7\" (1.702 m).    Weight as of this encounter: 126 lb (57.2 kg).    Medicare Hearing Assessment:   Hearing Screening    Time taken: 5/15/2025  8:30 AM  Screening Method: Whisper Test  Whisper Test Result: Pass         Visual Acuity:   Right Eye Visual Acuity: Corrected Right Eye Chart Acuity: 20/30   Left Eye Visual Acuity: Corrected Left Eye Chart Acuity: 20/30   Both Eyes Visual Acuity: Corrected Both Eyes Chart Acuity: 20/25   Able To Tolerate Visual Acuity: Yes        Assessment & Plan:   Allie Chakraborty is a 70 year old female who presents for a Medicare Assessment.     1. Encounter for annual health examination (Primary)  2. History of tobacco use  -     CT CHEST (CPT=71250); Future; Expected date: 05/15/2025  Recommend annual LD lung CT  3. Pulmonary emphysema, unspecified emphysema type (HCC)  Overview:  Seen on CT 7.2024  Stable - not currently on inhalers  4. Tobacco use  Consider using nicotine gum or lozenges. Motivated to quit.    Assessment & Plan      The patient indicates understanding of these issues and agrees to the plan.  Reinforced healthy diet, lifestyle, and exercise.      No follow-ups on file.     JEREMY Figueroa, 5/21/2025     Supplementary Documentation:   General Health:  In the past six months, have you lost more than 10 pounds without trying?: 2 - No  Has your appetite been poor?: No  Type of Diet: Balanced  How does the patient maintain a good energy level?: Appropriate Exercise, Daily Walks, Stretching  How would you describe your daily physical activity?: Moderate  How would you describe your current health state?: Good  How do you maintain positive mental well-being?: Social Interaction  On a scale of 0 to 10, with 0 being no pain and 10 being severe pain, what is your pain level?: 1 - (Mild)  In the past  six months, have you experienced urine leakage?: 0-No  At any time do you feel concerned for the safety/well-being of yourself and/or your children, in your home or elsewhere?: No  Have you had any immunizations at another office such as Influenza, Hepatitis B, Tetanus, or Pneumococcal?: Yes    Health Maintenance   Topic Date Due    Zoster Vaccines (2 of 2) 06/10/2023    COVID-19 Vaccine (6 - 2024-25 season) 09/01/2024    Annual Well Visit  01/01/2025    Tobacco Cessation Counseling  Never done    Lung Cancer Screening  04/15/2025    Influenza Vaccine (Season Ended) 10/01/2025    Mammogram  11/13/2025    Colorectal Cancer Screening  01/17/2028    DEXA Scan  Completed    Annual Depression Screening  Completed    Fall Risk Screening (Annual)  Completed    Pneumococcal Vaccine: 50+ Years  Completed    Meningococcal B Vaccine  Aged Out

## 2025-05-28 ENCOUNTER — HOSPITAL ENCOUNTER (OUTPATIENT)
Dept: CT IMAGING | Facility: HOSPITAL | Age: 70
Discharge: HOME | End: 2025-05-28
Attending: PHYSICIAN ASSISTANT
Payer: MEDICARE

## 2025-05-28 DIAGNOSIS — Z87.891 HISTORY OF TOBACCO USE: ICD-10-CM

## 2025-05-28 PROCEDURE — 71250 CT THORAX DX C-: CPT | Performed by: PHYSICIAN ASSISTANT

## 2025-05-31 ENCOUNTER — TELEPHONE (OUTPATIENT)
Dept: FAMILY MEDICINE CLINIC | Facility: CLINIC | Age: 70
End: 2025-05-31

## 2025-05-31 NOTE — TELEPHONE ENCOUNTER
Hello!    Can you please add this patient's CT to be reviewed at upcoming multidisciplinary meeting?    Thank you!  Maged

## 2025-06-04 ENCOUNTER — OFFICE VISIT (OUTPATIENT)
Facility: CLINIC | Age: 70
End: 2025-06-04
Payer: MEDICARE

## 2025-06-04 VITALS
RESPIRATION RATE: 16 BRPM | OXYGEN SATURATION: 99 % | WEIGHT: 125 LBS | BODY MASS INDEX: 19.62 KG/M2 | HEIGHT: 67 IN | HEART RATE: 80 BPM

## 2025-06-04 DIAGNOSIS — R91.1 INCIDENTAL LUNG NODULE, GREATER THAN OR EQUAL TO 8MM: Primary | ICD-10-CM

## 2025-06-04 PROCEDURE — 1160F RVW MEDS BY RX/DR IN RCRD: CPT | Performed by: INTERNAL MEDICINE

## 2025-06-04 PROCEDURE — 99204 OFFICE O/P NEW MOD 45 MIN: CPT | Performed by: INTERNAL MEDICINE

## 2025-06-04 PROCEDURE — 3008F BODY MASS INDEX DOCD: CPT | Performed by: INTERNAL MEDICINE

## 2025-06-04 PROCEDURE — 99407 BEHAV CHNG SMOKING > 10 MIN: CPT | Performed by: INTERNAL MEDICINE

## 2025-06-04 PROCEDURE — 1159F MED LIST DOCD IN RCRD: CPT | Performed by: INTERNAL MEDICINE

## 2025-06-04 NOTE — PROGRESS NOTES
The following individual(s) verbally consented to be recorded using ambient AI listening technology and understand that they can each withdraw their consent to this listening technology at any point by asking the clinician to turn off or pause the recording:    Patient name: Allie Lino Palmer  Additional names:

## 2025-06-04 NOTE — PATIENT INSTRUCTIONS
Today, we discussed the findings of your recent imaging, which showed a pulmonary nodule in your lung. A pulmonary nodule is a small, round or oval-shaped growth in the lung that appears on a chest X-ray or CT scan. While many pulmonary nodules are benign (non-cancerous), it is important to investigate them further to rule out the possibility of cancer or other conditions.    We talked about the importance of obtaining a tissue biopsy to determine the nature of the nodule. A biopsy is crucial because it allows us to identify whether the nodule is benign or if it requires further treatment. Without this, we cannot definitively determine if the nodule is cancerous, and further management may vary based on the biopsy results.    To obtain the biopsy, we will perform a robot-assisted navigational bronchoscopy. This procedure uses advanced technology to guide a small, flexible tube (bronchoscope) through the airways to the site of the nodule. The \"robot-assisted\" aspect refers to the enhanced precision and control that robotic technology provides, allowing us to safely navigate the airways and obtain a sample from the nodule with minimal risk.    During the procedure, we will:    Anesthesia will place an endotracheal tube and you will be asleep for the entirety of the procedure.  Insert a bronchoscope into the airways, through an endotracheal tube, and carefully navigate to the nodule.  Use a 3D imaging system to map the nodule’s location.  Once in position, we will use specialized tools to take a small tissue sample (biopsy) from the nodule.  The sample will then be sent to the lab for analysis.    The procedure will be performed under general anesthesia. Most patients can go home the same day, but we will monitor you briefly after the procedure to ensure everything is stable.  The major risks of the procedure are pneumothorax and bleeding, of which that occurs approximately 1-1.5% of the time.    Next Steps:  You will  be scheduled for the robot-assisted navigational bronchoscopy in the coming days.  We will discuss the results of the biopsy as soon as they are available, which typically takes about 1-2 weeks.  Based on the results, we will discuss any further treatment or follow-up care that may be necessary.  If you have any questions or concerns before the procedure, please feel free to contact the office.    Thank you for your trust in your care.    Supriya Ayala MD

## 2025-06-05 NOTE — H&P (VIEW-ONLY)
[facility] General Pulmonary Consult Note    Chief Complaint:  Chief Complaint   Patient presents with    New Patient     Pt ref for lung nodule        History of Present Illness:  History of Present Illness  Allie Chakraborty is a 70 year old female who presents with a lung nodule. She was referred by Dr. Dodson for evaluation of a lung nodule.    A lung nodule located on the lower left lobe was identified during a recent lung scan. The nodule has shown growth, increasing by a millimeter. She experiences no day-to-day breathing difficulties, which she finds unusual given the nodule's presence.    She has a history of smoking on and off for years, though she has not smoked in the past month. This smoking history is relevant given the current concern for the lung nodule.    In terms of family history, she believes her grandparents may have had lung disease or lung cancer, but she is unsure due to poor record-keeping.      Past Medical History:   Past Medical History[1]     Past Surgical History: Past Surgical History[2]    Family Medical History: Family History[3]     Social History:   Social History     Socioeconomic History    Marital status:      Spouse name: Not on file    Number of children: Not on file    Years of education: Not on file    Highest education level: Not on file   Occupational History    Occupation: homemaker   Tobacco Use    Smoking status: Former     Current packs/day: 0.00     Average packs/day: 0.5 packs/day for 43.8 years (21.9 ttl pk-yrs)     Types: Cigarettes     Start date:      Quit date: 2019     Years since quittin.5     Passive exposure: Yes    Smokeless tobacco: Never    Tobacco comments:     Difficult   Vaping Use    Vaping status: Never Used   Substance and Sexual Activity    Alcohol use: No    Drug use: No    Sexual activity: Not on file   Other Topics Concern     Service Not Asked    Blood Transfusions Not Asked    Caffeine Concern No    Occupational Exposure  Not Asked    Hobby Hazards Not Asked    Sleep Concern Not Asked    Stress Concern No    Weight Concern No    Special Diet No    Back Care Not Asked    Exercise No    Bike Helmet Not Asked    Seat Belt Yes    Self-Exams Yes     Comment: Monthly   Social History Narrative    Not on file     Social Drivers of Health     Food Insecurity: Unknown (12/7/2024)    Food Insecurity     Food Insecurity: Patient declined   Transportation Needs: Unknown (12/7/2024)    Transportation Needs     Lack of Transportation: Patient declined     Car Seat: Not on file   Stress: Not on file   Housing Stability: Unknown (12/7/2024)    Housing Stability     Housing Instability: Patient declined     Housing Instability Emergency: Not on file     Crib or Bassinette: Not on file        Allergies: Wellbutrin [bupropion]     Medications: Current Medications[4]    Review of Systems: Review of Systems    Physical Exam:  Pulse 80   Resp 16   Ht 5' 7\" (1.702 m)   Wt 125 lb (56.7 kg)   SpO2 99%   BMI 19.58 kg/m²      Constitutional: alert, cooperative. No acute distress.  HEENT: Head NC/AT. Nares normal. Septum midline. Mucosa normal. No drainage or sinus tenderness.. Mallampati 2+  Cardio: Regular rate and rhythm. Normal S1 and S2. No murmurs.   Respiratory: Thorax symmetrical with no labored breathing. clear to auscultation bilaterally  GI: NABS. Abd soft, non-tender.  Extremities: No clubbing or cyanosis. No BLE edema.    Neurologic: A&Ox3. No gross motor deficits.  Skin: Warm, dry  Psych: Calm, cooperative. Pleasant affect.    Results:  Images personally reviewed - my own review dictated as below  Results  RADIOLOGY  Lung CT: Nodule on lower left lobe, increased by 1 mm, no malignant features, 9 mm in size  WBC: 6, done on 4/29/2025.  HGB: 12.5, done on 4/29/2025.  PLT: 223, done on 4/29/2025.     Glucose: 88, done on 4/29/2025.  Cr: 0.75, done on 4/29/2025.  Last eGFR was 86 on 4/29/2025.  CA: 9.4, done on 4/29/2025.  Na: 138, done on  4/29/2025.  K: 3.9, done on 4/29/2025.  Cl: 106, done on 4/29/2025.  CO2: 31, done on 4/29/2025.  Last ALB was 4.4% done on 4/29/2025.     CT CHEST (CPT=71250)  Result Date: 5/28/2025  CONCLUSION:  Slight increase in size pleural base nodule left lower lobe along the major fissure.  LOCATION:  [id number]   Dictated by (CST): Luís Salamanca MD on 5/28/2025 at 8:05 AM     Finalized by (CST): Luís Salamanca MD on 5/28/2025 at 8:09 AM          Assessment/Plan:  Assessment & Plan  Lung nodule  9 mm nodule in lower left lobe increased by 1 mm. We discussed options of management including surveillance, PET scan, biopsy, and surgical options.  Discussed that if malignant there is high curative potential if malignant. 1.6-1.7% pneumothorax risk during biopsy due to fissure location.  - Schedule biopsy for definitive diagnosis.  - Plan surgical removal if malignancy confirmed.    We discussed various management options including serial imaging, PET scan, biopsy, and surgical management including the pros and cons to each  We discussed CT findings in detail and I reviewed images with patient  We discussed role of bronchoscopy  with EBUS guided biopsy and robot assisted navigational bronchoscopy  Benefits including EBUS assisted biopsy being superior to conventional bronchoscopy or mediastinoscopy were dicussed  Risks including but limited to bleeding, infection, pneumothorax, bronchospasm were discussed  Alternatives such as surgical biopsy vs CT guided biopsy were discussed  Patient understands procedure, risks, benefits, and alternatives and would like to proceed, will get scheduled as soon as feasible    Smoking  Intermittent smoking history increases lung cancer risk.    Eczema  Eczema present, no specific plan made.        No follow-ups on file.    Supriya Ayala MD  6/5/2025         [1]   Past Medical History:   Abdominal pain, epigastric    Anxiety    Current    Back pain    Just tecent    Belching    Cellulitis  and abscess of unspecified site    Constipation    Fatigue    Not always    Hemorrhoids    Hoarseness, chronic    Always had deep voice    Indigestion    Leaking of urine    Not bad    Mastodynia    Night sweats    Not always    Other enthesopathy of elbow region    Pain in thoracic spine    Painful urination    Just prior to blockage    Rash and other nonspecific skin eruption    Shortness of breath    Prior to hosp stay    Stress    Current    Visual impairment    reading glasses    Wears glasses    Glasses   [2]   Past Surgical History:  Procedure Laterality Date    Breast biopsy  2001      1988/1992/1995    x 3    Colonoscopy  2022    Colonoscopy N/A 2025    Procedure: COLONOSCOPY;  Surgeon: Kem Watson DO;  Location:  ENDOSCOPY    Angelia localization wire 1 site left (cpt=19281)       atypical ductal hyperplasia    Needle biopsy left      Benign    Other surgical history      Other surgical history  2002    EXCISION OF LEFT BREAST MASS    Other surgical history  10/01/2010    REMOVED A GROWTH ON TONGUE    Tonsillectomy  1960    Tubal ligation      After last c section   [3]   Family History  Problem Relation Age of Onset    Cancer Maternal Grandmother         BREAST    Breast Cancer Maternal Grandmother 60    Cancer Maternal Grandfather     Cancer Maternal Grandfather     Cancer Father     Thyroid Disorder Mother     Breast Cancer Brother     Polyps Brother    [4]   Current Outpatient Medications   Medication Sig Dispense Refill    RESTASIS 0.05 % Ophthalmic Emulsion Apply 1 drop to eye 2 (two) times daily. 1 drop each eye twice a day.  4    Krill Oil 1000 MG Oral Cap Take by mouth daily.      Calcium Carb-Cholecalciferol 1000-800 MG-UNIT Oral Tab Take 1 tablet by mouth daily.      Multiple Vitamins-Minerals (WOMENS 50+ ADVANCED) Oral Cap Take 1 tablet by mouth daily.      COLLAGEN OR Take by mouth.

## 2025-06-05 NOTE — PROGRESS NOTES
[facility] General Pulmonary Consult Note    Chief Complaint:  Chief Complaint   Patient presents with    New Patient     Pt ref for lung nodule        History of Present Illness:  History of Present Illness  Allie Chakraborty is a 70 year old female who presents with a lung nodule. She was referred by Dr. Dodson for evaluation of a lung nodule.    A lung nodule located on the lower left lobe was identified during a recent lung scan. The nodule has shown growth, increasing by a millimeter. She experiences no day-to-day breathing difficulties, which she finds unusual given the nodule's presence.    She has a history of smoking on and off for years, though she has not smoked in the past month. This smoking history is relevant given the current concern for the lung nodule.    In terms of family history, she believes her grandparents may have had lung disease or lung cancer, but she is unsure due to poor record-keeping.      Past Medical History:   Past Medical History[1]     Past Surgical History: Past Surgical History[2]    Family Medical History: Family History[3]     Social History:   Social History     Socioeconomic History    Marital status:      Spouse name: Not on file    Number of children: Not on file    Years of education: Not on file    Highest education level: Not on file   Occupational History    Occupation: homemaker   Tobacco Use    Smoking status: Former     Current packs/day: 0.00     Average packs/day: 0.5 packs/day for 43.8 years (21.9 ttl pk-yrs)     Types: Cigarettes     Start date:      Quit date: 2019     Years since quittin.5     Passive exposure: Yes    Smokeless tobacco: Never    Tobacco comments:     Difficult   Vaping Use    Vaping status: Never Used   Substance and Sexual Activity    Alcohol use: No    Drug use: No    Sexual activity: Not on file   Other Topics Concern     Service Not Asked    Blood Transfusions Not Asked    Caffeine Concern No    Occupational Exposure  Not Asked    Hobby Hazards Not Asked    Sleep Concern Not Asked    Stress Concern No    Weight Concern No    Special Diet No    Back Care Not Asked    Exercise No    Bike Helmet Not Asked    Seat Belt Yes    Self-Exams Yes     Comment: Monthly   Social History Narrative    Not on file     Social Drivers of Health     Food Insecurity: Unknown (12/7/2024)    Food Insecurity     Food Insecurity: Patient declined   Transportation Needs: Unknown (12/7/2024)    Transportation Needs     Lack of Transportation: Patient declined     Car Seat: Not on file   Stress: Not on file   Housing Stability: Unknown (12/7/2024)    Housing Stability     Housing Instability: Patient declined     Housing Instability Emergency: Not on file     Crib or Bassinette: Not on file        Allergies: Wellbutrin [bupropion]     Medications: Current Medications[4]    Review of Systems: Review of Systems    Physical Exam:  Pulse 80   Resp 16   Ht 5' 7\" (1.702 m)   Wt 125 lb (56.7 kg)   SpO2 99%   BMI 19.58 kg/m²      Constitutional: alert, cooperative. No acute distress.  HEENT: Head NC/AT. Nares normal. Septum midline. Mucosa normal. No drainage or sinus tenderness.. Mallampati 2+  Cardio: Regular rate and rhythm. Normal S1 and S2. No murmurs.   Respiratory: Thorax symmetrical with no labored breathing. clear to auscultation bilaterally  GI: NABS. Abd soft, non-tender.  Extremities: No clubbing or cyanosis. No BLE edema.    Neurologic: A&Ox3. No gross motor deficits.  Skin: Warm, dry  Psych: Calm, cooperative. Pleasant affect.    Results:  Images personally reviewed - my own review dictated as below  Results  RADIOLOGY  Lung CT: Nodule on lower left lobe, increased by 1 mm, no malignant features, 9 mm in size  WBC: 6, done on 4/29/2025.  HGB: 12.5, done on 4/29/2025.  PLT: 223, done on 4/29/2025.     Glucose: 88, done on 4/29/2025.  Cr: 0.75, done on 4/29/2025.  Last eGFR was 86 on 4/29/2025.  CA: 9.4, done on 4/29/2025.  Na: 138, done on  4/29/2025.  K: 3.9, done on 4/29/2025.  Cl: 106, done on 4/29/2025.  CO2: 31, done on 4/29/2025.  Last ALB was 4.4% done on 4/29/2025.     CT CHEST (CPT=71250)  Result Date: 5/28/2025  CONCLUSION:  Slight increase in size pleural base nodule left lower lobe along the major fissure.  LOCATION:  [id number]   Dictated by (CST): Luís Salamanca MD on 5/28/2025 at 8:05 AM     Finalized by (CST): Luís Salamanca MD on 5/28/2025 at 8:09 AM          Assessment/Plan:  Assessment & Plan  Lung nodule  9 mm nodule in lower left lobe increased by 1 mm. We discussed options of management including surveillance, PET scan, biopsy, and surgical options.  Discussed that if malignant there is high curative potential if malignant. 1.6-1.7% pneumothorax risk during biopsy due to fissure location.  - Schedule biopsy for definitive diagnosis.  - Plan surgical removal if malignancy confirmed.    We discussed various management options including serial imaging, PET scan, biopsy, and surgical management including the pros and cons to each  We discussed CT findings in detail and I reviewed images with patient  We discussed role of bronchoscopy  with EBUS guided biopsy and robot assisted navigational bronchoscopy  Benefits including EBUS assisted biopsy being superior to conventional bronchoscopy or mediastinoscopy were dicussed  Risks including but limited to bleeding, infection, pneumothorax, bronchospasm were discussed  Alternatives such as surgical biopsy vs CT guided biopsy were discussed  Patient understands procedure, risks, benefits, and alternatives and would like to proceed, will get scheduled as soon as feasible    Smoking  Intermittent smoking history increases lung cancer risk.    Eczema  Eczema present, no specific plan made.        No follow-ups on file.    Supriya Ayala MD  6/5/2025         [1]   Past Medical History:   Abdominal pain, epigastric    Anxiety    Current    Back pain    Just tecent    Belching    Cellulitis  and abscess of unspecified site    Constipation    Fatigue    Not always    Hemorrhoids    Hoarseness, chronic    Always had deep voice    Indigestion    Leaking of urine    Not bad    Mastodynia    Night sweats    Not always    Other enthesopathy of elbow region    Pain in thoracic spine    Painful urination    Just prior to blockage    Rash and other nonspecific skin eruption    Shortness of breath    Prior to hosp stay    Stress    Current    Visual impairment    reading glasses    Wears glasses    Glasses   [2]   Past Surgical History:  Procedure Laterality Date    Breast biopsy  2001      1988/1992/1995    x 3    Colonoscopy  2022    Colonoscopy N/A 2025    Procedure: COLONOSCOPY;  Surgeon: Kem Watson DO;  Location:  ENDOSCOPY    Angelia localization wire 1 site left (cpt=19281)       atypical ductal hyperplasia    Needle biopsy left      Benign    Other surgical history      Other surgical history  2002    EXCISION OF LEFT BREAST MASS    Other surgical history  10/01/2010    REMOVED A GROWTH ON TONGUE    Tonsillectomy  1960    Tubal ligation      After last c section   [3]   Family History  Problem Relation Age of Onset    Cancer Maternal Grandmother         BREAST    Breast Cancer Maternal Grandmother 60    Cancer Maternal Grandfather     Cancer Maternal Grandfather     Cancer Father     Thyroid Disorder Mother     Breast Cancer Brother     Polyps Brother    [4]   Current Outpatient Medications   Medication Sig Dispense Refill    RESTASIS 0.05 % Ophthalmic Emulsion Apply 1 drop to eye 2 (two) times daily. 1 drop each eye twice a day.  4    Krill Oil 1000 MG Oral Cap Take by mouth daily.      Calcium Carb-Cholecalciferol 1000-800 MG-UNIT Oral Tab Take 1 tablet by mouth daily.      Multiple Vitamins-Minerals (WOMENS 50+ ADVANCED) Oral Cap Take 1 tablet by mouth daily.      COLLAGEN OR Take by mouth.

## 2025-06-13 ENCOUNTER — TELEPHONE (OUTPATIENT)
Facility: CLINIC | Age: 70
End: 2025-06-13

## 2025-06-13 DIAGNOSIS — R91.1 INCIDENTAL LUNG NODULE, GREATER THAN OR EQUAL TO 8MM: Primary | ICD-10-CM

## 2025-06-13 NOTE — TELEPHONE ENCOUNTER
Received a call from Centralized scheduling stating an order is needed for a CT chest elissa Bronch.  Call routed to DR. Ayala.

## 2025-06-19 ENCOUNTER — HOSPITAL ENCOUNTER (OUTPATIENT)
Facility: HOSPITAL | Age: 70
Setting detail: HOSPITAL OUTPATIENT SURGERY
Discharge: HOME | End: 2025-06-19
Attending: INTERNAL MEDICINE | Admitting: INTERNAL MEDICINE
Payer: MEDICARE

## 2025-06-19 ENCOUNTER — ANESTHESIA (OUTPATIENT)
Dept: ENDOSCOPY | Facility: HOSPITAL | Age: 70
End: 2025-06-19
Payer: MEDICARE

## 2025-06-19 ENCOUNTER — APPOINTMENT (OUTPATIENT)
Dept: GENERAL RADIOLOGY | Facility: HOSPITAL | Age: 70
End: 2025-06-19
Attending: INTERNAL MEDICINE
Payer: MEDICARE

## 2025-06-19 ENCOUNTER — HOSPITAL ENCOUNTER (OUTPATIENT)
Dept: CT IMAGING | Facility: HOSPITAL | Age: 70
Setting detail: HOSPITAL OUTPATIENT SURGERY
Discharge: HOME | End: 2025-06-19
Payer: MEDICARE

## 2025-06-19 ENCOUNTER — ANESTHESIA EVENT (OUTPATIENT)
Dept: ENDOSCOPY | Facility: HOSPITAL | Age: 70
End: 2025-06-19
Payer: MEDICARE

## 2025-06-19 VITALS
OXYGEN SATURATION: 97 % | TEMPERATURE: 98 F | HEIGHT: 67 IN | HEART RATE: 67 BPM | DIASTOLIC BLOOD PRESSURE: 65 MMHG | BODY MASS INDEX: 19.62 KG/M2 | SYSTOLIC BLOOD PRESSURE: 94 MMHG | WEIGHT: 125 LBS | RESPIRATION RATE: 18 BRPM

## 2025-06-19 DIAGNOSIS — R91.1 INCIDENTAL LUNG NODULE, GREATER THAN OR EQUAL TO 8MM: ICD-10-CM

## 2025-06-19 DIAGNOSIS — R59.0 THORACIC LYMPHADENOPATHY: ICD-10-CM

## 2025-06-19 DIAGNOSIS — R91.8 LUNG NODULES: ICD-10-CM

## 2025-06-19 LAB
BASOPHILS NFR BRONCH: 0 %
COLOR FLD: COLORLESS
EOSINOPHIL NFR BRONCH: 0 %
GLUCOSE BLD-MCNC: 92 MG/DL (ref 70–99)
LYMPHOCYTES NFR BRONCH: 19 %
MONOS+MACROS NFR BRONCH: 72 %
NEUTROPHILS NFR BRONCH: 9 %
TOTAL CELLS COUNTED FLD: 100

## 2025-06-19 PROCEDURE — 76380 CAT SCAN FOLLOW-UP STUDY: CPT | Performed by: INTERNAL MEDICINE

## 2025-06-19 PROCEDURE — 31654 BRONCH EBUS IVNTJ PERPH LES: CPT | Performed by: INTERNAL MEDICINE

## 2025-06-19 PROCEDURE — 77012 CT SCAN FOR NEEDLE BIOPSY: CPT | Performed by: INTERNAL MEDICINE

## 2025-06-19 PROCEDURE — 31627 NAVIGATIONAL BRONCHOSCOPY: CPT | Performed by: INTERNAL MEDICINE

## 2025-06-19 PROCEDURE — 31624 DX BRONCHOSCOPE/LAVAGE: CPT | Performed by: INTERNAL MEDICINE

## 2025-06-19 PROCEDURE — 71045 X-RAY EXAM CHEST 1 VIEW: CPT | Performed by: INTERNAL MEDICINE

## 2025-06-19 PROCEDURE — 31628 BRONCHOSCOPY/LUNG BX EACH: CPT | Performed by: INTERNAL MEDICINE

## 2025-06-19 PROCEDURE — 31645 BRNCHSC W/THER ASPIR 1ST: CPT | Performed by: INTERNAL MEDICINE

## 2025-06-19 PROCEDURE — 31629 BRONCHOSCOPY/NEEDLE BX EACH: CPT | Performed by: INTERNAL MEDICINE

## 2025-06-19 PROCEDURE — 76497 UNLISTED CT PROCEDURE: CPT

## 2025-06-19 RX ORDER — ONDANSETRON 2 MG/ML
INJECTION INTRAMUSCULAR; INTRAVENOUS AS NEEDED
Status: DISCONTINUED | OUTPATIENT
Start: 2025-06-19 | End: 2025-06-19 | Stop reason: SURG

## 2025-06-19 RX ORDER — DEXAMETHASONE SODIUM PHOSPHATE 4 MG/ML
VIAL (ML) INJECTION AS NEEDED
Status: DISCONTINUED | OUTPATIENT
Start: 2025-06-19 | End: 2025-06-19 | Stop reason: SURG

## 2025-06-19 RX ORDER — HYDROMORPHONE HYDROCHLORIDE 1 MG/ML
0.4 INJECTION, SOLUTION INTRAMUSCULAR; INTRAVENOUS; SUBCUTANEOUS EVERY 5 MIN PRN
Status: DISCONTINUED | OUTPATIENT
Start: 2025-06-19 | End: 2025-06-19 | Stop reason: HOSPADM

## 2025-06-19 RX ORDER — PHENYLEPHRINE HCL 10 MG/ML
VIAL (ML) INJECTION AS NEEDED
Status: DISCONTINUED | OUTPATIENT
Start: 2025-06-19 | End: 2025-06-19 | Stop reason: SURG

## 2025-06-19 RX ORDER — SODIUM CHLORIDE, SODIUM LACTATE, POTASSIUM CHLORIDE, CALCIUM CHLORIDE 600; 310; 30; 20 MG/100ML; MG/100ML; MG/100ML; MG/100ML
INJECTION, SOLUTION INTRAVENOUS CONTINUOUS
Status: DISCONTINUED | OUTPATIENT
Start: 2025-06-19 | End: 2025-06-19

## 2025-06-19 RX ORDER — NALOXONE HYDROCHLORIDE 0.4 MG/ML
0.08 INJECTION, SOLUTION INTRAMUSCULAR; INTRAVENOUS; SUBCUTANEOUS AS NEEDED
Status: DISCONTINUED | OUTPATIENT
Start: 2025-06-19 | End: 2025-06-19 | Stop reason: HOSPADM

## 2025-06-19 RX ORDER — HYDROMORPHONE HYDROCHLORIDE 1 MG/ML
0.2 INJECTION, SOLUTION INTRAMUSCULAR; INTRAVENOUS; SUBCUTANEOUS EVERY 5 MIN PRN
Status: DISCONTINUED | OUTPATIENT
Start: 2025-06-19 | End: 2025-06-19 | Stop reason: HOSPADM

## 2025-06-19 RX ORDER — ALBUTEROL SULFATE 0.83 MG/ML
2.5 SOLUTION RESPIRATORY (INHALATION) AS NEEDED
Status: DISCONTINUED | OUTPATIENT
Start: 2025-06-19 | End: 2025-06-19 | Stop reason: HOSPADM

## 2025-06-19 RX ORDER — IPRATROPIUM BROMIDE AND ALBUTEROL SULFATE 2.5; .5 MG/3ML; MG/3ML
SOLUTION RESPIRATORY (INHALATION)
Status: DISCONTINUED
Start: 2025-06-19 | End: 2025-06-19

## 2025-06-19 RX ORDER — ONDANSETRON 2 MG/ML
4 INJECTION INTRAMUSCULAR; INTRAVENOUS ONCE AS NEEDED
Status: DISCONTINUED | OUTPATIENT
Start: 2025-06-19 | End: 2025-06-19 | Stop reason: HOSPADM

## 2025-06-19 RX ORDER — ONDANSETRON 2 MG/ML
4 INJECTION INTRAMUSCULAR; INTRAVENOUS EVERY 6 HOURS PRN
Status: DISCONTINUED | OUTPATIENT
Start: 2025-06-19 | End: 2025-06-19 | Stop reason: HOSPADM

## 2025-06-19 RX ORDER — SODIUM CHLORIDE, SODIUM LACTATE, POTASSIUM CHLORIDE, CALCIUM CHLORIDE 600; 310; 30; 20 MG/100ML; MG/100ML; MG/100ML; MG/100ML
INJECTION, SOLUTION INTRAVENOUS CONTINUOUS
Status: DISCONTINUED | OUTPATIENT
Start: 2025-06-19 | End: 2025-06-19 | Stop reason: HOSPADM

## 2025-06-19 RX ORDER — HYDROMORPHONE HYDROCHLORIDE 1 MG/ML
0.6 INJECTION, SOLUTION INTRAMUSCULAR; INTRAVENOUS; SUBCUTANEOUS EVERY 5 MIN PRN
Status: DISCONTINUED | OUTPATIENT
Start: 2025-06-19 | End: 2025-06-19 | Stop reason: HOSPADM

## 2025-06-19 RX ORDER — METOCLOPRAMIDE HYDROCHLORIDE 5 MG/ML
10 INJECTION INTRAMUSCULAR; INTRAVENOUS EVERY 8 HOURS PRN
Status: DISCONTINUED | OUTPATIENT
Start: 2025-06-19 | End: 2025-06-19 | Stop reason: HOSPADM

## 2025-06-19 RX ORDER — IPRATROPIUM BROMIDE AND ALBUTEROL SULFATE 2.5; .5 MG/3ML; MG/3ML
3 SOLUTION RESPIRATORY (INHALATION) ONCE
Status: COMPLETED | OUTPATIENT
Start: 2025-06-19 | End: 2025-06-19

## 2025-06-19 RX ORDER — ACETAMINOPHEN 500 MG
1000 TABLET ORAL ONCE AS NEEDED
Status: DISCONTINUED | OUTPATIENT
Start: 2025-06-19 | End: 2025-06-19 | Stop reason: HOSPADM

## 2025-06-19 RX ORDER — LIDOCAINE HYDROCHLORIDE 10 MG/ML
INJECTION, SOLUTION EPIDURAL; INFILTRATION; INTRACAUDAL; PERINEURAL AS NEEDED
Status: DISCONTINUED | OUTPATIENT
Start: 2025-06-19 | End: 2025-06-19 | Stop reason: SURG

## 2025-06-19 RX ORDER — ROCURONIUM BROMIDE 10 MG/ML
INJECTION, SOLUTION INTRAVENOUS AS NEEDED
Status: DISCONTINUED | OUTPATIENT
Start: 2025-06-19 | End: 2025-06-19 | Stop reason: SURG

## 2025-06-19 RX ADMIN — PHENYLEPHRINE HCL 50 MCG: 10 MG/ML VIAL (ML) INJECTION at 09:57:00

## 2025-06-19 RX ADMIN — ROCURONIUM BROMIDE 50 MG: 10 INJECTION, SOLUTION INTRAVENOUS at 09:38:00

## 2025-06-19 RX ADMIN — ONDANSETRON 4 MG: 2 INJECTION INTRAMUSCULAR; INTRAVENOUS at 10:32:00

## 2025-06-19 RX ADMIN — LIDOCAINE HYDROCHLORIDE 50 MG: 10 INJECTION, SOLUTION EPIDURAL; INFILTRATION; INTRACAUDAL; PERINEURAL at 09:37:00

## 2025-06-19 RX ADMIN — PHENYLEPHRINE HCL 50 MCG: 10 MG/ML VIAL (ML) INJECTION at 09:55:00

## 2025-06-19 RX ADMIN — SODIUM CHLORIDE, SODIUM LACTATE, POTASSIUM CHLORIDE, CALCIUM CHLORIDE: 600; 310; 30; 20 INJECTION, SOLUTION INTRAVENOUS at 09:32:00

## 2025-06-19 RX ADMIN — DEXAMETHASONE SODIUM PHOSPHATE 8 MG: 4 MG/ML VIAL (ML) INJECTION at 09:43:00

## 2025-06-19 RX ADMIN — PHENYLEPHRINE HCL 50 MCG: 10 MG/ML VIAL (ML) INJECTION at 09:59:00

## 2025-06-19 NOTE — INTERVAL H&P NOTE
Pre-op Diagnosis: Lung nodules [R91.8]  Thoracic lymphadenopathy [R59.0]    The above referenced H&P was reviewed by Supriya Ayala MD on 6/19/2025, the patient was examined and no significant changes have occurred in the patient's condition since the H&P was performed.  I discussed with the patient and/or legal representative the potential benefits, risks and side effects of this procedure; the likelihood of the patient achieving goals; and potential problems that might occur during recuperation.  I discussed reasonable alternatives to the procedure, including risks, benefits and side effects related to the alternatives and risks related to not receiving this procedure.  We will proceed with procedure as planned.

## 2025-06-19 NOTE — ANESTHESIA PREPROCEDURE EVALUATION
PRE-OP EVALUATION    Patient Name: Allie Chakraborty    Admit Diagnosis: Lung nodules [R91.8]  Thoracic lymphadenopathy [R59.0]    Pre-op Diagnosis: Lung nodules [R91.8]  Thoracic lymphadenopathy [R59.0]    ROBOT-ASSISTED NAVIGATIONAL BRONCHOSCOPY, ENDOBRONCHIAL ULTRASOUND (EBUS)/RADIAL PROBE ENDOBRONCHIAL ULTRASOUND/FLUOROSCOPY/CRYOPROBE/CYTOLOGY/3D SPIN C ARM    Anesthesia Procedure: ROBOT-ASSISTED NAVIGATIONAL BRONCHOSCOPY, ENDOBRONCHIAL ULTRASOUND (EBUS)/RADIAL PROBE ENDOBRONCHIAL ULTRASOUND/FLUOROSCOPY/CRYOPROBE/CYTOLOGY/3D SPIN C ARM  ENDOBRONCHIAL ULTRASOUND (EBUS)/RADIAL PROBE ENDOBRONCHIAL ULTRASOUND/FLUOROSCOPY/CRYOPROBE/CYTOLOGY/3D SPIN C ARM    Surgeons and Role:     * Supriya Ayala MD - Primary    Pre-op vitals reviewed.  Temp: 97.4 °F (36.3 °C)  Pulse: 57  Resp: 18  BP: 99/71  SpO2: 100 %  Body mass index is 19.58 kg/m².    Current medications reviewed.  Hospital Medications:  Current Medications[1]    Outpatient Medications:   Prescriptions Prior to Admission[2]    Allergies: Wellbutrin [bupropion]      Anesthesia Evaluation        Anesthetic Complications  (-) history of anesthetic complications         GI/Hepatic/Renal      (-) GERD                           Cardiovascular        Exercise tolerance: good     MET: >4           (-) CAD  (-) past MI                (-) angina              Endo/Other      (-) diabetes     (-) hypothyroidism  (-) hyperthyroidism                     Pulmonary        (+) COPD   COPD not requiring home oxygen.                Neuro/Psych          (-) CVA   (-) TIA  (-) seizures               Tobacco use disorder         Past Surgical History[3]  Social Hx on file[4]  History   Drug Use No     Available pre-op labs reviewed.  Lab Results   Component Value Date    WBC 6.0 04/29/2025    RBC 4.04 04/29/2025    HGB 12.5 04/29/2025    HCT 38.2 04/29/2025    MCV 94.6 04/29/2025    MCH 30.9 04/29/2025    MCHC 32.7 04/29/2025    RDW 12.6 04/29/2025    .0  2025     Lab Results   Component Value Date     2025    K 3.9 2025     2025    CO2 31.0 2025    BUN <5 (L) 2025    CREATSERUM 0.75 2025    GLU 88 2025    CA 9.4 2025            Airway      Mallampati: I  Mouth opening: >3 FB  TM distance: > 6 cm  Neck ROM: full Cardiovascular    Cardiovascular exam normal.         Dental    Dentition appears grossly intact         Pulmonary    Pulmonary exam normal.                 Other findings              ASA: 2   Plan: general  NPO status verified and patient meets guidelines.          Plan/risks discussed with: patient and spouse                Present on Admission:  **None**             [1]    lactated ringers infusion   Intravenous Continuous    [COMPLETED] ipratropium-albuterol (Duoneb) 0.5-2.5 (3) MG/3ML inhalation solution 3 mL  3 mL Nebulization Once    ipratropium-albuterol (Duoneb) 0.5-2.5 (3) MG/3ML inhalation solution       [2]   Medications Prior to Admission   Medication Sig Dispense Refill Last Dose/Taking    psyllium Oral Powd Pack Take 1 packet by mouth 2 (two) times daily. prn   2025    SAMBUCOL BLACK ELDERBERRY OR Take by mouth.   Past Week    Collagen-Vitamin C-Biotin (COLLAGEN 1500/C OR) Take by mouth.   Past Week    RESTASIS 0.05 % Ophthalmic Emulsion Apply 1 drop to eye in the morning and 1 drop before bedtime. 1 drop each eye twice a day.  4 2025 Morning    Krill Oil 1000 MG Oral Cap Take by mouth in the morning.   Past Week    Calcium Carb-Cholecalciferol 1000-800 MG-UNIT Oral Tab Take 1 tablet by mouth in the morning.   2025    Multiple Vitamins-Minerals (WOMENS 50+ ADVANCED) Oral Cap Take 1 tablet by mouth in the morning.   2025   [3]   Past Surgical History:  Procedure Laterality Date    Breast biopsy  2001      //1995    x 3    Colonoscopy  2022    Colonoscopy N/A 2025    Procedure: COLONOSCOPY;  Surgeon: Kem Watson DO;   Location: [facility] ENDOSCOPY    Angelia localization wire 1 site left (cpt=19281)       atypical ductal hyperplasia    Needle biopsy left      Benign    Other surgical history      Other surgical history  2002    EXCISION OF LEFT BREAST MASS    Other surgical history  10/01/2010    REMOVED A GROWTH ON TONGUE    Tonsillectomy  1960    Tubal ligation      After last c section   [4]   Social History  Socioeconomic History    Marital status:    Occupational History    Occupation: homemaker   Tobacco Use    Smoking status: Former     Current packs/day: 0.00     Average packs/day: 0.5 packs/day for 43.8 years (21.9 ttl pk-yrs)     Types: Cigarettes     Start date:      Quit date: 2019     Years since quittin.6     Passive exposure: Yes    Smokeless tobacco: Never    Tobacco comments:     Difficult   Vaping Use    Vaping status: Never Used   Substance and Sexual Activity    Alcohol use: No    Drug use: No   Other Topics Concern    Caffeine Concern No    Stress Concern No    Weight Concern No    Special Diet No    Exercise No    Seat Belt Yes    Self-Exams Yes     Comment: Monthly

## 2025-06-19 NOTE — ANESTHESIA PROCEDURE NOTES
Airway  Date/Time: 6/19/2025 9:39 AM  Reason: elective      General Information and Staff   Patient location during procedure: OR  Anesthesiologist: Pilar Da Silva DO  Performed: anesthesiologist   Performed by: Pilar Da Silva DO  Authorized by: Pilar Da Silva DO        Indications and Patient Condition  Indications for airway management: anesthesia  Sedation level: deep      Preoxygenated: yesPatient position: sniffing    Mask difficulty assessment: 1 - vent by mask    Final Airway Details    Final airway type: endotracheal airway    Successful airway: ETT  Cuffed: yes   Successful intubation technique: direct laryngoscopy  Endotracheal tube insertion site: oral  Blade: José Miguel  Blade size: #3  ETT size (mm): 8.5    Cormack-Lehane Classification: grade I - full view of glottis  Placement verified by: capnometry   Measured from: lips  ETT to lips (cm): 23  Number of attempts at approach: 1  Number of other approaches attempted: 0

## 2025-06-19 NOTE — DISCHARGE INSTRUCTIONS
Home Care Instructions Following Bronchoscopy / Endobronchial Ultrasound with Sedation    Diet:  Sip fluids initially and advance to your regular diet as tolerated.  Do not drink alcohol today.    Medication:  If you have questions about resuming your normal medications, please contact your Primary Care Physician.    Activities:  Do not drive today.  Do not operate any machinery today (including kitchen equipment).    What to Expect:  A sore throat  A cough  Hoarseness  A small amount of blood in your sputum    Contact Your Doctor If:  You have difficulty breathing  You have chest pain  You have a fever greater than 102°F  You cough up more than a few tablespoons of blood in your sputum    **If unable to reach your doctor, please go to the [facility] Emergency Room**    - Your referring physician will receive a full report of your examination.  - Please contact your physician’s office within one week for results if appointment not scheduled.

## 2025-06-19 NOTE — OPERATIVE REPORT
[facility] Bronchoscopy Operative Report    Allie Chakraborty Patient Status:  Hospital Outpatient Surgery    1955 MRN [id number]   Location [facility] ENDOSCOPY PAIN CENTER Attending Supriya Ayala*   Hosp Day # 0 PCP Robin Dodson MD     DATE OF OPERATION: 25    PREOPERATIVE DIAGNOSIS(ES):  lung nodules     POSTOPERATIVE DIAGNOSIS(ES): same     OPERATION(S) PERFORMED:   3D Fluoroscopy utilization (CPT 67258)  Robotic Navigational bronchoscopy (CPT 71998)  Fluoroscopy guided Transbronchial biopsy x 1 lobe _ (CPT 77265)  Transbronchial Needle Aspiration of lung nodule x 1 site _ (CPT 91430)  Transbronchial Brushings (CPT 69700)  Bronchoalveolar lavage (CPT 22796)  Radial (peripheral) endobronchial ultrasound (CPT 32888)  *Fluoroscopy guided transbronchial biopsy - each additional lobe _ (CPT 49063)  *Transbronchial Needle Aspiration of lung nodule (each additional site) _ (CPT 57746)  Bronchoscopy guided fiducial marker placement (CPT 90107)    Endobronchial ultrasound for LN sampling:  Bronchoscopy with endobronchial ultrasonography (EBUS)-guided needle aspiration/biopsy 1 or 2 lymph node stations  or structures (CPT 56311)  Bronchoscopy with endobronchial ultrasonography (EBUS)-guided needle aspiration/biopsy 3 or more lymph node stations or structures (CPT 97856)     SURGEON: Supriya Ayala MD    ANESTHESIA: General. Please see separate flow sheet.      EQUIPMENT:   Olympus 7.5 MHz endobronchial ultrasound bronchoscope with dedicated 21-gauge ViziShot needle.   Olympus 20 MHz radial probe, endobronchial ultrasound  Olympus adult therapeutic video bronchoscope  Robotic Ion navigation software with vision probe  Sellbox 3D C-arm fluoroscope.   21 gauge Intuitive Flexision needle  ERBE 1.1mm cryoprobe and ERBE console    INDICATION: The patient is a 70 year old female who was found to have lung nodules.  The procedure is being undertaken for diagnostic purposes and  mediastinal staging. Differential diagnosis, risks, benefits and alternatives were discussed at length. Alternatives such as mediastinoscopy and conventional transbronchial needle biopsy were discussed. Endobronchial ultrasound guided transbronchial needle aspiration is superior to blind transbronchial needle aspiration and is the recommended approach for this indication.       CONSENT:  Risks and benefits were reviewed with the patient in detail regarding the procedure as well as anesthesia prior to the procedure. All questions were answered, and the patient was agreeable.     PROCEDURE:    Time out done prior to the start procedure.  The patient's CT scan and 3-dimensional DICOM format was loaded onto the Intuitive Ion Planpoint software.   Target point T1 in the lingula lobe was marked and measured at 8-9 mm. Virtual pathways were created to the lesion. This information was stored to a jump drive and loaded onto the Intuitive Ion controller software in the bronchoscopy suite.      Robotic navigation, airway evaluation and biopsies  The patient was placed in a supine position, anesthesia administered, ETT was placed. The flexible bronchoscope was inserted and airway inspection was performed down to the subsegmental levels. Some diffuse scattered mucus was suctioned with the bronchoscope.  The scope was then withdrawn.   The robotic Ion catheter was introduced via the swivel adaptor in the ETT tube. Registration was performed and confirmed with acceptable divergence. Using shape sensing robotic catheter guidance, the lingula lobe lesion was located within the Superior subsegment. Radial probe ultrasound was introduced through the robotic catheter confirming appropriate positioning with eccentric view. 3D fluoroscopy was utilized to better locate the lesion and ensure the tool was within the target lesion.  Using additional fluoroscopic guidance, transbronchial needle aspirations  and transbronchial cryobiopsies  were performed. Passes were given to the cytopathologist for screening with report of hypercellularity. A bronchoalveolar lavage was then performed in the lingula lobe Superior segment with the instillation of 20 ml sterile saline and return of 10 ml which was split and sent for microbiological studies and cytology.  There was no evidence of active bleeding. Robotic catheter was withdrawn.    A 3D spin was performed with no evidence of pneumothorax, but I was not able to visualize apices    Endobronchial ultrasound  The ultrasound videobronchoscope was used and inserted via swivel adaptor attached to the endotracheal tube. The bronchoscope was then advanced into the trachea.  Ultrasound examination revealed a 0mm lymph node in the 11L left hilar location, a 3 mm lymph node in the 7 subcarinal location, a 0mm lymph node in the 4L left paratracheal location, a 0mm lymph node in the 4R right paratracheal location, and a 0mm lymph node in the 11R right hilar location.    Hemostasis was visually confirmed and the bronchoscope was removed. The patient tolerated the procedure well without immediate complications.     EBL:  <5mL     IMPRESSION: lung nodule     PLAN:   Patient to be transferred to PACU in stable condition, follow up XR indicated  Await results of bronchoscopy for further delineation of care     Supriya Ayala MD  [facility] Pulmonary Medicine  Office: [phone]

## 2025-06-19 NOTE — ANESTHESIA POSTPROCEDURE EVALUATION
[facility]    Allie Chakraborty Patient Status:  Hospital Outpatient Surgery   Age/Gender 70 year old female MRN [id number]   Location [facility] ENDOSCOPY PAIN CENTER Attending Supriya Ayala*   Hosp Day # 0 PCP Robin Dodson MD       Anesthesia Post-op Note    ROBOT-ASSISTED NAVIGATIONAL BRONCHOSCOPY WITH TRANSBRONCHIAL NEEDLE ASPIRATION,CRYOBIOPSIES AND BRONCHOALVEOLAR LAVAGE, ENDOBRONCHIAL ULTRASOUND (EBUS)/RADIAL PROBE ENDOBRONCHIAL ULTRASOUND/FLUOROSCOPY/CRYOPROBE/CYTOLOGY/3D SPIN C ARM    Procedure Summary       Date: 06/19/25 Room / Location:  ENDOSCOPY 04 /  ENDOSCOPY    Anesthesia Start: 0932 Anesthesia Stop: 1046    Procedures:       ROBOT-ASSISTED NAVIGATIONAL BRONCHOSCOPY WITH TRANSBRONCHIAL NEEDLE ASPIRATION,CRYOBIOPSIES AND BRONCHOALVEOLAR LAVAGE, ENDOBRONCHIAL ULTRASOUND (EBUS)/RADIAL PROBE ENDOBRONCHIAL ULTRASOUND/FLUOROSCOPY/CRYOPROBE/CYTOLOGY/3D SPIN C ARM      ENDOBRONCHIAL ULTRASOUND (EBUS)/RADIAL PROBE ENDOBRONCHIAL ULTRASOUND/FLUOROSCOPY/CRYOPROBE/CYTOLOGY/3D SPIN C ARM Diagnosis:       Lung nodules      Thoracic lymphadenopathy      (Lung nodules [R91.8]Thoracic lymphadenopathy [R59.0])    Surgeons: Supriya Ayala MD Anesthesiologist: Pilar Da Silva DO    Anesthesia Type: general ASA Status: 2            Anesthesia Type: general    Vitals Value Taken Time   /66 06/19/25 10:47   Temp 97.4 06/19/25 10:47   Pulse 72 06/19/25 10:47   Resp 10 06/19/25 10:47   SpO2 100 06/19/25 10:47           Patient Location: PACU    Anesthesia Type: general    Airway Patency: patent    Postop Pain Control: adequate    Mental Status: preanesthetic baseline    Nausea/Vomiting: none    Cardiopulmonary/Hydration status: stable euvolemic    Complications: no apparent anesthesia related complications    Postop vital signs: stable    Dental Exam: Unchanged from Preop    Patient to be discharged from PACU when criteria met.

## 2025-06-20 ENCOUNTER — TELEPHONE (OUTPATIENT)
Facility: CLINIC | Age: 70
End: 2025-06-20

## 2025-06-20 NOTE — TELEPHONE ENCOUNTER
Spoke to patient she states she is doing well no symptoms. She is scheduled for a follow-up 6/26 with Dr. Ayala.

## 2025-06-23 LAB — ASPERGILLUS AG BAL/SERUM: 0.33 INDEX

## 2025-06-26 ENCOUNTER — OFFICE VISIT (OUTPATIENT)
Facility: CLINIC | Age: 70
End: 2025-06-26
Payer: MEDICARE

## 2025-06-26 VITALS
OXYGEN SATURATION: 97 % | SYSTOLIC BLOOD PRESSURE: 102 MMHG | BODY MASS INDEX: 19.62 KG/M2 | DIASTOLIC BLOOD PRESSURE: 64 MMHG | HEIGHT: 67 IN | RESPIRATION RATE: 16 BRPM | HEART RATE: 93 BPM | WEIGHT: 125 LBS

## 2025-06-26 DIAGNOSIS — R91.1 SOLID NODULE OF LUNG 6 MM TO 8 MM IN DIAMETER: Primary | ICD-10-CM

## 2025-06-26 PROCEDURE — 3074F SYST BP LT 130 MM HG: CPT | Performed by: INTERNAL MEDICINE

## 2025-06-26 PROCEDURE — 99214 OFFICE O/P EST MOD 30 MIN: CPT | Performed by: INTERNAL MEDICINE

## 2025-06-26 PROCEDURE — 1160F RVW MEDS BY RX/DR IN RCRD: CPT | Performed by: INTERNAL MEDICINE

## 2025-06-26 PROCEDURE — 3008F BODY MASS INDEX DOCD: CPT | Performed by: INTERNAL MEDICINE

## 2025-06-26 PROCEDURE — 3078F DIAST BP <80 MM HG: CPT | Performed by: INTERNAL MEDICINE

## 2025-06-26 PROCEDURE — 1159F MED LIST DOCD IN RCRD: CPT | Performed by: INTERNAL MEDICINE

## 2025-06-26 NOTE — PROGRESS NOTES
The following individual(s) verbally consented to be recorded using ambient AI listening technology and understand that they can each withdraw their consent to this listening technology at any point by asking the clinician to turn off or pause the recording:    Patient name: Allie Lino Palmer  Additional names:  scooter -

## 2025-06-27 LAB — NON GYNE INTERPRETATION: NEGATIVE

## 2025-06-27 NOTE — PROGRESS NOTES
[facility] Pulmonary Follow Up Note    Chief Complaint:  Chief Complaint   Patient presents with    Follow - Up     Bronch follow up        History of Present Illness:  History of Present Illness  Allie Chakraborty is a 70 year old female who presents for follow-up after a lung biopsy.    She underwent a lung biopsy to evaluate a spot between the lobes of her lung, which was identified as a reactive lymphoid aggregate. The biopsy results were negative for malignancy.    The spot in question had grown by one millimeter and appeared more vertical in shape than is typical for a lymph node, prompting further investigation and the decision to perform a biopsy.    She has a significant smoking history, having smoked for fifty years, which is relevant to her lung health and the need for ongoing surveillance.    No current breathing difficulties or other respiratory symptoms.         Past Medical History:   Past Medical History[1]     Past Surgical History:   Past Surgical History[2]    Family Medical History: Family History[3]     Social History:   Social History     Socioeconomic History    Marital status:      Spouse name: Not on file    Number of children: Not on file    Years of education: Not on file    Highest education level: Not on file   Occupational History    Occupation: homemaker   Tobacco Use    Smoking status: Former     Current packs/day: 0.00     Average packs/day: 0.5 packs/day for 43.8 years (21.9 ttl pk-yrs)     Types: Cigarettes     Start date:      Quit date: 2019     Years since quittin.6     Passive exposure: Yes    Smokeless tobacco: Never    Tobacco comments:     Difficult   Vaping Use    Vaping status: Never Used   Substance and Sexual Activity    Alcohol use: No    Drug use: No    Sexual activity: Not on file   Other Topics Concern     Service Not Asked    Blood Transfusions Not Asked    Caffeine Concern No    Occupational Exposure Not Asked    Hobby Hazards Not Asked     Sleep Concern Not Asked    Stress Concern No    Weight Concern No    Special Diet No    Back Care Not Asked    Exercise No    Bike Helmet Not Asked    Seat Belt Yes    Self-Exams Yes     Comment: Monthly   Social History Narrative    Not on file     Social Drivers of Health     Food Insecurity: Unknown (12/7/2024)    Food Insecurity     Food Insecurity: Patient declined   Transportation Needs: Unknown (12/7/2024)    Transportation Needs     Lack of Transportation: Patient declined     Car Seat: Not on file   Stress: Not on file   Housing Stability: Unknown (12/7/2024)    Housing Stability     Housing Instability: Patient declined     Housing Instability Emergency: Not on file     Crib or Bassinette: Not on file        Medications: Current Medications[4]    Review of Systems: Review of Systems     Physical Exam:  /64 (BP Location: Right arm, Patient Position: Sitting, Cuff Size: adult)   Pulse 93   Resp 16   Ht 5' 7\" (1.702 m)   Wt 125 lb (56.7 kg)   SpO2 97%   BMI 19.58 kg/m²      Constitutional: alert, cooperative. No acute distress.  HEENT: Head NC/AT. Nares normal. Septum midline. Mucosa normal. No drainage or sinus tenderness.  Cardio: Regular rate and rhythm. Normal S1 and S2. No murmurs.   Respiratory: Thorax symmetrical with no labored breathing. clear to auscultation bilaterally  Extremities: No clubbing or cyanosis. No BLE edema.    Neurologic: A&Ox3. No gross motor deficits.  Skin: Warm, dry  Psych: Calm, cooperative. Pleasant affect.    Results:  Images personally reviewed - reading is my own personal review  Results  PATHOLOGY  Biopsy: Reactive lymphoid aggregate (05/28/2025)       PFTs:       No data to display                   No data to display                    WBC: 6, done on 4/29/2025.  HGB: 12.5, done on 4/29/2025.  PLT: 223, done on 4/29/2025.     Glucose: 88, done on 4/29/2025.  Cr: 0.75, done on 4/29/2025.  Last eGFR was 86 on 4/29/2025.  CA: 9.4, done on 4/29/2025.  Na: 138, done  on 4/29/2025.  K: 3.9, done on 4/29/2025.  Cl: 106, done on 4/29/2025.  CO2: 31, done on 4/29/2025.  Last ALB was 4.4% done on 4/29/2025.     XR CT SCAN FOLLOW UP STUDY (CPT=76380)  Result Date: 6/19/2025  CONCLUSION: 1. Images obtained during the procedure for procedural purposes.    Dictated by (CST): Francoise Christianson MD on 6/19/2025 at 1:14 PM     Finalized by (CST): Francoise Christianson MD on 6/19/2025 at 1:16 PM       XR ENDOSCOPY - N/C  Result Date: 6/19/2025  CONCLUSION:  1. Fluoroscopy and technical time provided.   LOCATION:  [facility]   Dictated by (CST): Francoise Christianson MD on 6/19/2025 at 11:51 AM     Finalized by (CST): Francoise Christianson MD on 6/19/2025 at 11:52 AM       XR CHEST AP PORTABLE  (CPT=71045)  Result Date: 6/19/2025  CONCLUSION:  No pneumothorax seen.  No active disease seen.   LOCATION:  [facility]      Dictated by (CST): Luís Salamanca MD on 6/19/2025 at 11:40 AM     Finalized by (CST): Luís Salamanca MD on 6/19/2025 at 11:40 AM       CT CHEST BRONCH NAVIGATION PRE OP LESS THAN 6 WEEKS (CPT=76497)  Result Date: 6/19/2025  CONCLUSION:  No significant change in juxtapleural left lower lobe nodule.   LOCATION:  [facility]   Dictated by (CST): Augustin Baires MD on 6/19/2025 at 8:52 AM     Finalized by (CST): Augustin Baires MD on 6/19/2025 at 9:00 AM       CT CHEST (CPT=71250)  Result Date: 5/28/2025  CONCLUSION:  Slight increase in size pleural base nodule left lower lobe along the major fissure.  LOCATION:  [id number]   Dictated by (CST): Luís Salamanca MD on 5/28/2025 at 8:05 AM     Finalized by (CST): Luís Salamanca MD on 5/28/2025 at 8:09 AM          Assessment/Plan:  Assessment & Plan  Lung nodules  Biopsy identified reactive lymphoid aggregate, benign. Atypical shape but non-cancerous.  - Annual CT scan in one year for lung cancer surveillance due to smoking history.       Lung Cancer Surveillance/Screening  Lung Cancer Counseling and Shared Decision Making Session in an Asymptomatic Smoker/Former Smoker   Allie Lino  Palmer is a 70 year old female without current symptoms of lung cancer.  History   Smoking Status    Former    Types: Cigarettes   Smokeless Tobacco    Never        She received information on the importance of adherence to annual lung cancer Low Dose CT (LDCT) screening, the impact of her comorbidities and her ability or willingness to undergo diagnosis and treatment. she is in agreement and an order will be placed for CT LUNG LD SCREENING(CPT=71271).    We counseled the importance of maintaining cigarette smoking abstinence if she is a former smoker and the importance of smoking cessation if she is a current smoker and we discussed and furnished information about tobacco cessation interventions.    Return in about 11 months (around 5/26/2026).    I spent 35 minutes obtaining and reviewing records, preparing for the visit including reviewing chart and prior testing, face to face time examining the patient and obtaining history, counseling, arranging and reviewing office-based testing, independently reviewing relevant studies and documentation exclusive of other billable procedures.      Supriya Ayala MD  6/27/2025         [1]   Past Medical History:   Abdominal pain, epigastric    Anxiety    Current    Back pain    Just tecent    Belching    Cellulitis and abscess of unspecified site    Constipation    Diverticulosis of large intestine    Fatigue    Not always    Hemorrhoids    Hoarseness, chronic    Always had deep voice    Indigestion    Leaking of urine    Not bad    Mastodynia    Night sweats    Not always    Other enthesopathy of elbow region    Pain in thoracic spine    Painful urination    Just prior to blockage    Pulmonary emphysema (HCC)    Rash and other nonspecific skin eruption    Shortness of breath    Prior to hosp stay    Stress    Current    Visual impairment    reading glasses    Wears glasses    Glasses   [2]   Past Surgical History:  Procedure Laterality Date    Breast biopsy  05/2001       1988/1992/1995    x 3    Colonoscopy  2022    Colonoscopy N/A 2025    Procedure: COLONOSCOPY;  Surgeon: Kem Watson DO;  Location:  ENDOSCOPY    Angelia localization wire 1 site left (cpt=19281)       atypical ductal hyperplasia    Needle biopsy left      Benign    Other surgical history      Other surgical history  2002    EXCISION OF LEFT BREAST MASS    Other surgical history  10/01/2010    REMOVED A GROWTH ON TONGUE    Tonsillectomy  1960    Tubal ligation  I    After last c section   [3]   Family History  Problem Relation Age of Onset    Cancer Maternal Grandmother         BREAST    Breast Cancer Maternal Grandmother 60    Cancer Maternal Grandfather     Cancer Maternal Grandfather     Cancer Father     Thyroid Disorder Mother     Breast Cancer Brother     Polyps Brother    [4]   Current Outpatient Medications   Medication Sig Dispense Refill    psyllium Oral Powd Pack Take 1 packet by mouth 2 (two) times daily. prn      SAMBUCOL BLACK ELDERBERRY OR Take by mouth.      Collagen-Vitamin C-Biotin (COLLAGEN 1500/C OR) Take by mouth.      RESTASIS 0.05 % Ophthalmic Emulsion Apply 1 drop to eye in the morning and 1 drop before bedtime. 1 drop each eye twice a day.  4    Krill Oil 1000 MG Oral Cap Take by mouth in the morning.      Calcium Carb-Cholecalciferol 1000-800 MG-UNIT Oral Tab Take 1 tablet by mouth in the morning.      Multiple Vitamins-Minerals (WOMENS 50+ ADVANCED) Oral Cap Take 1 tablet by mouth in the morning.

## (undated) DEVICE — 10FT COMBINED O2 DELIVERY/CO2 MONITORING. FILTER WITH MICROSTREAM TYPE LUER: Brand: DUAL ADULT NASAL CANNULA

## (undated) DEVICE — Device: Brand: ERBE

## (undated) DEVICE — 3M™ RED DOT™ MONITORING ELECTRODE WITH FOAM TAPE AND STICKY GEL, 50/BAG, 20/CASE, 72/PLT 2570: Brand: RED DOT™

## (undated) DEVICE — KIT VLV 5 PC AIR H2O SUCT BX ENDOGATOR CONN

## (undated) DEVICE — BIOPSY NEEDLE, 23G: Brand: FLEXISION

## (undated) DEVICE — Device: Brand: BALLOON

## (undated) DEVICE — SINGLE USE SUCTION VALVE MAJ-209: Brand: SINGLE USE SUCTION VALVE (STERILE)

## (undated) DEVICE — 4-WAY HIGH FLOW STOPCOCK W/ROTATING LUER: Brand: ICU MEDICAL

## (undated) DEVICE — AIRLIFE™ MISTY MAX 10™ NEBULIZER WITH 7 FOOT (2.1 M) FEMALE U/CONNECT-IT CRUSH RESISTANT OXYGEN TUBING, BAFFLED TEE ADAPTER (22 MM I.D./ 22 MM O.D.), MOUTHPIECE AND 6 INCH (15 CM) FLEXTUBE: Brand: AIRLIFE™

## (undated) DEVICE — 3M™ RED DOT™ MONITORING ELECTRODE WITH FOAM TAPE AND STICKY GEL 2570-3, 3/BAG, 200/CASE, 54/PLT: Brand: RED DOT™

## (undated) DEVICE — SYRINGE MED 10ML SLIP TIP CLR BRL TAPR PLUNG

## (undated) DEVICE — MEDI-VAC NON-CONDUCTIVE SUCTION TUBING: Brand: CARDINAL HEALTH

## (undated) DEVICE — MEDI-VAC SUCTION HANDLE REGULAR CAPACITY: Brand: CARDINAL HEALTH

## (undated) DEVICE — SINGLE USE BIOPSY VALVE MAJ-210: Brand: SINGLE USE BIOPSY VALVE (STERILE)

## (undated) DEVICE — KIT CUSTOM ENDOPROCEDURE STERIS

## (undated) DEVICE — VISION PROBE ADAPTER AND SUCTION ADAPTER

## (undated) DEVICE — V2 SPECIMEN COLLECTION MANIFOLD KIT: Brand: NEPTUNE

## (undated) DEVICE — KENDALL SCD EXPRESS SLEEVES, KNEE LENGTH, MEDIUM: Brand: KENDALL SCD

## (undated) DEVICE — 60 ML SYRINGE REGULAR TIP: Brand: MONOJECT

## (undated) DEVICE — MAJ-1414 SINGLE USE ADPATER BIOPSY VALV: Brand: SINGLE USE ADAPTOR BIOPSY VALVE

## (undated) DEVICE — BITEBLOCK ENDOSCP 60FR MAXI STRP

## (undated) DEVICE — LASSO POLYPECTOMY SNARE: Brand: LASSO

## (undated) DEVICE — BOWL MED MD 16OZ PLAS CAP GRAD

## (undated) DEVICE — BALLOON HEMOSTATIC EUS LINEAR

## (undated) DEVICE — ADAPTER BRONCHSCP 15MM FBROPT SWVL 2 AXIS

## (undated) DEVICE — SWIVEL CONNECTOR

## (undated) DEVICE — 1200CC GUARDIAN II: Brand: GUARDIAN

## (undated) NOTE — MR AVS SNAPSHOT
[address] [facility] [address]  [facility],  [address]  [address] [facility] [address] [phone]               Thank you for choosing us [facility].   We are glad to serve you and happy to provide you with this Commonly known as:  CHANTIX           * Notice: This list has 4 medication(s) that are the same as other medications prescribed for you. Read the directions carefully, and ask your doctor or other care provider to review them with you.          Where [facility]

## (undated) NOTE — Clinical Note
I had the pleasure of seeing Ms. Octavia More in my office today. Please see my attached note.       Jan Cassidy

## (undated) NOTE — LETTER
[address]  [address]  Authorization for Surgical Operation and Procedure     Date:___________                                                                                                         Time:__________  I hereby authorize * Surgery not found *, my physician and his/her assistants (if applicable), which may include medical students, residents, and/or fellows, to perform the following surgical operation/ procedure and administer such anesthesia as may be determined necessary by my physician:  Operation/Procedure name (s)  on Allie Chakraborty   2.   I recognize that during the surgical operation/procedure, unforeseen conditions may necessitate additional or different procedures than those listed above.  I, therefore, further authorize and request that the above-named surgeon, assistants, or designees perform such procedures as are, in their judgment, necessary and desirable.    3.   My surgeon/physician has discussed prior to my surgery the potential benefits, risks and side effects of this procedure; the likelihood of achieving goals; and potential problems that might occur during recuperation.  They also discussed reasonable alternatives to the procedure, including risks, benefits, and side effects related to the alternatives and risks related to not receiving this procedure.  I have had all my questions answered and I acknowledge that no guarantee has been made as to the result that may be obtained.    4.   Should the need arise during my operation/procedure, which includes change of level of care prior to discharge, I also consent to the administration of blood and/or blood products.  Further, I understand that despite careful testing and screening of blood or blood products by collecting agencies, I may still be subject to ill effects as a result of receiving a blood transfusion and/or blood products.  The following are some, but not all, of the potential risks  that can occur: fever and allergic reactions, hemolytic reactions, transmission of diseases such as Hepatitis, AIDS and Cytomegalovirus (CMV) and fluid overload.  In the event that I wish to have an autologous transfusion of my own blood, or a directed donor transfusion, I will discuss this with my physician.  Check only if Refusing Blood or Blood Products  I understand refusal of blood or blood products as deemed necessary by my physician may have serious consequences to my condition to include possible death. I hereby assume responsibility for my refusal and release the hospital, its personnel, and my physicians from any responsibility for the consequences of my refusal.          o  Refuse      5.   I authorize the use of any specimen, organs, tissues, body parts or foreign objects that may be removed from my body during the operation/procedure for diagnosis, research or teaching purposes and their subsequent disposal by hospital authorities.  I also authorize the release of specimen test results and/or written reports to my treating physician on the hospital medical staff or other referring or consulting physicians involved in my care, at the discretion of the Pathologist or my treating physician.    6.   I consent to the photographing or videotaping of the operations or procedures to be performed, including appropriate portions of my body for medical, scientific, or educational purposes, provided my identity is not revealed by the pictures or by descriptive texts accompanying them.  If the procedure has been photographed/videotaped, the surgeon will obtain the original picture, image, videotape or CD.  The hospital will not be responsible for storage, release or maintenance of the picture, image, tape or CD.    7.   I consent to the presence of a  or observers in the operating room as deemed necessary by my physician or their designees.    8.   I recognize that in the event my procedure results  in extended X-Ray/fluoroscopy time, I may develop a skin reaction.    9. If I have a Do Not Attempt Resuscitation (DNAR) order in place, that status will be suspended while in the operating room, procedural suite, and during the recovery period unless otherwise explicitly stated by me (or a person authorized to consent on my behalf). The surgeon or my attending physician will determine when the applicable recovery period ends for purposes of reinstating the DNAR order.  10. Patients having a sterilization procedure: I understand that if the procedure is successful the results will be permanent and it will therefore be impossible for me to inseminate, conceive, or bear children.  I also understand that the procedure is intended to result in sterility, although the result has not been guaranteed.   11. I acknowledge that my physician has explained sedation/analgesia administration to me including the risk and benefits I consent to the administration of sedation/analgesia as may be necessary or desirable in the judgment of my physician.    I CERTIFY THAT I HAVE READ AND FULLY UNDERSTAND THE ABOVE CONSENT TO OPERATION and/or OTHER PROCEDURE.    _________________________________________  __________________________________  Signature of Patient     Signature of Responsible Person         ___________________________________         Printed Name of Responsible Person           _________________________________                 Relationship to Patient  _________________________________________  ______________________________  Signature of Witness          Date  Time      Patient Name: lAlie Chakraborty     : 1955                 Printed: 2024     Medical Record #: [id number]                     Page 1 [address]  [address]    Consent for Anesthesia    I, Allie Chakraborty agree to be cared for by an anesthesiologist, who is  specially trained to monitor me and give me medicine to put me to sleep or keep me comfortable during my procedure    I understand that my anesthesiologist is not an employee or agent of [facility] or [employer]. He or she works for [employer].    As the patient asking for anesthesia services, I agree to:  Allow the anesthesiologist (anesthesia doctor) to give me medicine and do additional procedures as necessary. Some examples are: Starting or using an “IV” to give me medicine, fluids or blood during my procedure, and having a breathing tube placed to help me breathe when I’m asleep (intubation). In the event that my heart stops working properly, I understand that my anesthesiologist will make every effort to sustain my life, unless otherwise directed by [facility] Do Not Resuscitate documents.  Tell my anesthesia doctor before my procedure:  If I am pregnant.  The last time that I ate or drank.  All of the medicines I take (including prescriptions, herbal supplements, and pills I can buy without a prescription (including street drugs/illegal medications). Failure to inform my anesthesiologist about these medicines may increase my risk of anesthetic complications.  If I am allergic to anything or have had a reaction to anesthesia before.  I understand how the anesthesia medicine will help me (benefits).  I understand that with any type of anesthesia medicine there are risks:  The most common risks are: nausea, vomiting, sore throat, muscle soreness, damage to my eyes, mouth, or teeth (from breathing tube placement).  Rare risks include: remembering what happened during my procedure, allergic reactions to medications, injury to my airway, heart, lungs, vision, nerves, or muscles and in extremely rare instances death.  My doctor has explained to me other choices available to me for my care (alternatives).  Pregnant Patients (“epidural”):  I understand that the risks of having  an epidural (medicine given into my back to help control pain during labor), include itching, low blood pressure, difficulty urinating, headache or slowing of the baby’s heart. Very rare risks include infection, bleeding, seizure, irregular heart rhythms and nerve injury.  Regional Anesthesia (“spinal”, “epidural”, & “nerve blocks”):  I understand that rare but potential complications include headache, bleeding, infection, seizure, irregular heart rhythms, and nerve injury.    I can change my mind about having anesthesia services at any time before I get the medicine.    _____________________________________________________________________________  Patient (or Representative) Signature/Relationship to Patient  Date   Time    _____________________________________________________________________________   Name (if used)    Language/Organization   Time    _____________________________________________________________________________  Anesthesiologist Signature     Date   Time  I have discussed the procedure and information above with the patient (or patient’s representative) and answered their questions. The patient or their representative has agreed to have anesthesia services.    _____________________________________________________________________________  Witness        Date   Time  I have verified that the signature is that of the patient or patient’s representative, and that it was signed before the procedure  Patient Name: Allie Chakraborty     : 1955                 Printed: 2024     Medical Record #: [id number]                     Page 2 of 2

## (undated) NOTE — LETTER
Patient Name: Allie Chakraborty        : 1955       Medical Record #: [id number]    CONSENT FOR PROCEDURES/SEDATION    Date: 2024       Time: 1:30 PM        1. I authorize the performance upon Allie Chakraborty the following:    _____________HEMORRHOID BANDING________________________________    2. I authorize Dr. Hoyt (and whomever is designated as the doctor’s assistant), to perform the above mentioned procedures.    3. If any unforeseen conditions arise during this procedure calling for additional procedures, operations, or medications (including anesthesia and blood transfusion), I  further request and authorize the doctor to do whatever he/she deems advisable in my interest.    4. I consent to the taking and reproduction of any photographs in the course of this procedure for professional purposes.    5. I consent to the administration of such sedation as may be considered necessary or advisable by the physician responsible for this service, with the exception of  _____________________________.    6. I have been informed by my doctor of the nature and purpose of this procedure/sedation, possible alternative methods of treatment, risk involved and possible complications.      Signature of Patient:  ___________________________    Signature of person authorized to consent for patient: Relationship to patient:  ___________________________    ___________________    Witness: ____________________     Date: ______________    Provider: ____________________     Date: ______________

## (undated) NOTE — Clinical Note
I had the pleasure of seeing Ms. Refugio Ham in my office today. Please see my attached note.         Bernard Powell

## (undated) NOTE — Clinical Note
I had the pleasure of seeing MsMee Palomo Vallejo in my office today. Please see my attached note.       Lobo Andrews

## (undated) NOTE — LETTER
Patient Name: Allie Chakraborty        : 1955       Medical Record #: [id number]    CONSENT FOR PROCEDURES/SEDATION    Date: 2024       Time: 9:00 AM        1. I authorize the performance upon Allie Chakraborty the following:    _______________HEMORRHOID BANDING______________________________    2. I authorize Dr. Hoyt (and whomever is designated as the doctor’s assistant), to perform the above mentioned procedures.    3. If any unforeseen conditions arise during this procedure calling for additional procedures, operations, or medications (including anesthesia and blood transfusion), I  further request and authorize the doctor to do whatever he/she deems advisable in my interest.    4. I consent to the taking and reproduction of any photographs in the course of this procedure for professional purposes.    5. I consent to the administration of such sedation as may be considered necessary or advisable by the physician responsible for this service, with the exception of  _____________________________.    6. I have been informed by my doctor of the nature and purpose of this procedure/sedation, possible alternative methods of treatment, risk involved and possible complications.      Signature of Patient:  ___________________________    Signature of person authorized to consent for patient: Relationship to patient:  ___________________________    ___________________    Witness: ____________________     Date: ______________    Provider: ____________________     Date: ______________

## (undated) NOTE — MR AVS SNAPSHOT
[facility] [place]  [place],  [address]  [address] [facility] [address] [phone]               Thank you for choosing us for your health care visit with [facility]3]CHARLES.   We are glad to serve you and happy to provide you with this * Notice: This list has 2 medication(s) that are the same as other medications prescribed for you. Read the directions carefully, and ask your doctor or other care provider to review them with you.          Where to Get Your Medications      These medicat